# Patient Record
Sex: MALE | Race: WHITE | NOT HISPANIC OR LATINO | Employment: OTHER | URBAN - METROPOLITAN AREA
[De-identification: names, ages, dates, MRNs, and addresses within clinical notes are randomized per-mention and may not be internally consistent; named-entity substitution may affect disease eponyms.]

---

## 2021-01-25 ENCOUNTER — LAB (OUTPATIENT)
Dept: LAB | Facility: HOSPITAL | Age: 51
End: 2021-01-25

## 2021-01-25 ENCOUNTER — OFFICE VISIT (OUTPATIENT)
Dept: INTERNAL MEDICINE | Facility: CLINIC | Age: 51
End: 2021-01-25

## 2021-01-25 ENCOUNTER — TELEPHONE (OUTPATIENT)
Dept: INTERNAL MEDICINE | Facility: CLINIC | Age: 51
End: 2021-01-25

## 2021-01-25 VITALS
HEART RATE: 89 BPM | HEIGHT: 67 IN | SYSTOLIC BLOOD PRESSURE: 182 MMHG | TEMPERATURE: 97.1 F | DIASTOLIC BLOOD PRESSURE: 75 MMHG | BODY MASS INDEX: 32.02 KG/M2 | OXYGEN SATURATION: 95 % | WEIGHT: 204 LBS

## 2021-01-25 DIAGNOSIS — I10 ESSENTIAL HYPERTENSION: Primary | ICD-10-CM

## 2021-01-25 DIAGNOSIS — Z11.59 NEED FOR HEPATITIS C SCREENING TEST: ICD-10-CM

## 2021-01-25 DIAGNOSIS — Z13.220 NEED FOR LIPID SCREENING: ICD-10-CM

## 2021-01-25 DIAGNOSIS — K21.9 CHRONIC GERD: ICD-10-CM

## 2021-01-25 DIAGNOSIS — Z29.9 PREVENTIVE MEASURE: ICD-10-CM

## 2021-01-25 DIAGNOSIS — R21 RASH: ICD-10-CM

## 2021-01-25 DIAGNOSIS — R73.9 ELEVATED BLOOD SUGAR: ICD-10-CM

## 2021-01-25 PROCEDURE — 83013 H PYLORI (C-13) BREATH: CPT | Performed by: FAMILY MEDICINE

## 2021-01-25 PROCEDURE — 99204 OFFICE O/P NEW MOD 45 MIN: CPT | Performed by: FAMILY MEDICINE

## 2021-01-25 RX ORDER — CARVEDILOL 12.5 MG/1
12.5 TABLET ORAL 2 TIMES DAILY WITH MEALS
COMMUNITY
End: 2021-01-25 | Stop reason: DRUGHIGH

## 2021-01-25 RX ORDER — ASPIRIN 81 MG/1
81 TABLET ORAL DAILY
Qty: 90 TABLET | Refills: 1 | Status: SHIPPED | OUTPATIENT
Start: 2021-01-25 | End: 2021-08-03 | Stop reason: SDUPTHER

## 2021-01-25 RX ORDER — FAMOTIDINE 20 MG/1
20 TABLET, FILM COATED ORAL 2 TIMES DAILY
COMMUNITY
End: 2021-01-28 | Stop reason: SDUPTHER

## 2021-01-25 RX ORDER — DUPILUMAB 300 MG/2ML
INJECTION, SOLUTION SUBCUTANEOUS
COMMUNITY
Start: 2021-01-04

## 2021-01-25 RX ORDER — CARVEDILOL 25 MG/1
25 TABLET ORAL 2 TIMES DAILY WITH MEALS
Qty: 60 TABLET | Refills: 3 | Status: SHIPPED | OUTPATIENT
Start: 2021-01-25 | End: 2021-04-15 | Stop reason: SINTOL

## 2021-01-25 RX ORDER — FLUTICASONE PROPIONATE 50 MCG
2 SPRAY, SUSPENSION (ML) NASAL DAILY
COMMUNITY
End: 2021-06-07 | Stop reason: SDUPTHER

## 2021-01-25 RX ORDER — MONTELUKAST SODIUM 10 MG/1
10 TABLET ORAL NIGHTLY
COMMUNITY
End: 2021-06-07 | Stop reason: SDUPTHER

## 2021-01-25 NOTE — PATIENT INSTRUCTIONS
Hypertension, Adult  Hypertension is another name for high blood pressure. High blood pressure forces your heart to work harder to pump blood. This can cause problems over time.  There are two numbers in a blood pressure reading. There is a top number (systolic) over a bottom number (diastolic). It is best to have a blood pressure that is below 120/80. Healthy choices can help lower your blood pressure, or you may need medicine to help lower it.  What are the causes?  The cause of this condition is not known. Some conditions may be related to high blood pressure.  What increases the risk?  · Smoking.  · Having type 2 diabetes mellitus, high cholesterol, or both.  · Not getting enough exercise or physical activity.  · Being overweight.  · Having too much fat, sugar, calories, or salt (sodium) in your diet.  · Drinking too much alcohol.  · Having long-term (chronic) kidney disease.  · Having a family history of high blood pressure.  · Age. Risk increases with age.  · Race. You may be at higher risk if you are .  · Gender. Men are at higher risk than women before age 45. After age 65, women are at higher risk than men.  · Having obstructive sleep apnea.  · Stress.  What are the signs or symptoms?  · High blood pressure may not cause symptoms. Very high blood pressure (hypertensive crisis) may cause:  ? Headache.  ? Feelings of worry or nervousness (anxiety).  ? Shortness of breath.  ? Nosebleed.  ? A feeling of being sick to your stomach (nausea).  ? Throwing up (vomiting).  ? Changes in how you see.  ? Very bad chest pain.  ? Seizures.  How is this treated?  · This condition is treated by making healthy lifestyle changes, such as:  ? Eating healthy foods.  ? Exercising more.  ? Drinking less alcohol.  · Your health care provider may prescribe medicine if lifestyle changes are not enough to get your blood pressure under control, and if:  ? Your top number is above 130.  ? Your bottom number is above  80.  · Your personal target blood pressure may vary.  Follow these instructions at home:  Eating and drinking    · If told, follow the DASH eating plan. To follow this plan:  ? Fill one half of your plate at each meal with fruits and vegetables.  ? Fill one fourth of your plate at each meal with whole grains. Whole grains include whole-wheat pasta, brown rice, and whole-grain bread.  ? Eat or drink low-fat dairy products, such as skim milk or low-fat yogurt.  ? Fill one fourth of your plate at each meal with low-fat (lean) proteins. Low-fat proteins include fish, chicken without skin, eggs, beans, and tofu.  ? Avoid fatty meat, cured and processed meat, or chicken with skin.  ? Avoid pre-made or processed food.  · Eat less than 1,500 mg of salt each day.  · Do not drink alcohol if:  ? Your doctor tells you not to drink.  ? You are pregnant, may be pregnant, or are planning to become pregnant.  · If you drink alcohol:  ? Limit how much you use to:  § 0-1 drink a day for women.  § 0-2 drinks a day for men.  ? Be aware of how much alcohol is in your drink. In the [place] one drink equals one 12 oz bottle of beer (355 mL), one 5 oz glass of wine (148 mL), or one 1½ oz glass of hard liquor (44 mL).  Lifestyle    · Work with your doctor to stay at a healthy weight or to lose weight. Ask your doctor what the best weight is for you.  · Get at least 30 minutes of exercise most days of the week. This may include walking, swimming, or biking.  · Get at least 30 minutes of exercise that strengthens your muscles (resistance exercise) at least 3 days a week. This may include lifting weights or doing Pilates.  · Do not use any products that contain nicotine or tobacco, such as cigarettes, e-cigarettes, and chewing tobacco. If you need help quitting, ask your doctor.  · Check your blood pressure at home as told by your doctor.  · Keep all follow-up visits as told by your doctor. This is important.  Medicines  · Take over-the-counter  and prescription medicines only as told by your doctor. Follow directions carefully.  · Do not skip doses of blood pressure medicine. The medicine does not work as well if you skip doses. Skipping doses also puts you at risk for problems.  · Ask your doctor about side effects or reactions to medicines that you should watch for.  Contact a doctor if you:  · Think you are having a reaction to the medicine you are taking.  · Have headaches that keep coming back (recurring).  · Feel dizzy.  · Have swelling in your ankles.  · Have trouble with your vision.  Get help right away if you:  · Get a very bad headache.  · Start to feel mixed up (confused).  · Feel weak or numb.  · Feel faint.  · Have very bad pain in your:  ? Chest.  ? Belly (abdomen).  · Throw up more than once.  · Have trouble breathing.  Summary  · Hypertension is another name for high blood pressure.  · High blood pressure forces your heart to work harder to pump blood.  · For most people, a normal blood pressure is less than 120/80.  · Making healthy choices can help lower blood pressure. If your blood pressure does not get lower with healthy choices, you may need to take medicine.  This information is not intended to replace advice given to you by your health care provider. Make sure you discuss any questions you have with your health care provider.  Document Revised: 08/28/2019 Document Reviewed: 08/28/2019  Elsevier Patient Education © 2020 [employer]

## 2021-01-25 NOTE — PROGRESS NOTES
Subjective     Demetria Johnson is a 50 y.o. male.     Chief Complaint   Patient presents with   • Establish Care   • Hypertension   • Heartburn       History of Present Illness     HTN  -This is a chronic problem.   -BP check at home 140-170/  -salt intake :on low sodium diet   -associated signs and symptoms: none  -BP is not well controlled.   -No S/E reported from current Rx   -medication compliance: taking as prescribed  -Denies  blurred vision, chest pain, neck pain, orthopnea or shortness of breath.      Seasonal allergy with chronic sinusitis , he had 2 sinus surgery for sinus polyps , currently he is on biweekly injection for contact dermatitis     H/o elevated BG , not on Rx       Gastroesophageal Reflux   Patient complains of belching, heartburn and nausea. This is a recurrent problem. The current episode started more than 1 year ago. The problem occurs frequently. The heartburn duration is several minutes. The heartburn is located in the substernum.   The heartburn does not wake pt from sleep. The heartburn limits pt activity.   The heartburn changes with position.   The symptoms are aggravated by certain foods, lying down and caffeine.   Pertinent negatives include no melena.   There are no known risk factors.   2 months ago he had EGD at  , no one called him about the result  On Famotidine with little help    Rash; on the Lt forearm for 4 weeks , itchy , no change in size        The following portions of the patient's history were reviewed and updated as appropriate: allergies, current medications, past family history, past medical history, past social history, past surgical history and problem list.        Review of Systems   Gastrointestinal: Positive for GERD and indigestion. Negative for abdominal distention, abdominal pain, blood in stool, constipation, nausea and vomiting.   Musculoskeletal: Positive for arthralgias and myalgias. Negative for back pain and gait problem.   Skin:  Positive for color change and rash.       Vitals:    01/25/21 1056   BP: (!) 182/75   Pulse: 89   Temp: 97.1 °F (36.2 °C)   SpO2: 95%           01/25/21  1056   Weight: 92.5 kg (204 lb)         Body mass index is 31.95 kg/m².      Current Outpatient Medications   Medication Sig Dispense Refill   • Dupixent 300 MG/2ML solution prefilled syringe      • famotidine (PEPCID) 20 MG tablet Take 20 mg by mouth 2 (Two) Times a Day.     • fluticasone (FLONASE) 50 MCG/ACT nasal spray 2 sprays into the nostril(s) as directed by provider Daily.     • Fluticasone Furoate-Vilanterol (BREO ELLIPTA IN) Inhale.     • montelukast (SINGULAIR) 10 MG tablet Take 10 mg by mouth Every Night.     • aspirin (aspirin) 81 MG EC tablet Take 1 tablet by mouth Daily. 90 tablet 1   • carvedilol (Coreg) 25 MG tablet Take 1 tablet by mouth 2 (Two) Times a Day With Meals. 60 tablet 3   • triamcinolone (KENALOG) 0.1 % ointment Apply  topically to the appropriate area as directed 2 (Two) Times a Day. 30 g 1     No current facility-administered medications for this visit.                 Objective   Physical Exam  Constitutional:       General: He is not in acute distress.     Appearance: Normal appearance. He is not ill-appearing, toxic-appearing or diaphoretic.   HENT:      Head: Normocephalic.      Mouth/Throat:      Mouth: Mucous membranes are moist.   Eyes:      General: No scleral icterus.     Conjunctiva/sclera: Conjunctivae normal.   Neck:      Musculoskeletal: Neck supple.      Comments: No enlarged thyroid    Cardiovascular:      Rate and Rhythm: Normal rate and regular rhythm.      Heart sounds: Normal heart sounds. No murmur. No friction rub. No gallop.    Pulmonary:      Effort: Pulmonary effort is normal. No respiratory distress.      Breath sounds: Normal breath sounds. No stridor. No wheezing or rhonchi.   Abdominal:      General: Bowel sounds are normal. There is no distension.      Palpations: Abdomen is soft. There is no mass.       Tenderness: There is no abdominal tenderness. There is no right CVA tenderness, left CVA tenderness, guarding or rebound.      Hernia: No hernia is present.   Musculoskeletal:      Right lower leg: No edema.      Left lower leg: No edema.   Skin:     General: Skin is warm.      Findings: Erythema and rash present.      Comments: Erythematous rash over the Lt forearm    Neurological:      Mental Status: He is alert and oriented to person, place, and time.   Psychiatric:         Mood and Affect: Mood normal.         Behavior: Behavior normal.         Thought Content: Thought content normal.         Judgment: Judgment normal.           Assessment/Plan   Diagnoses and all orders for this visit:    1. Essential hypertension (Primary);  - Not at goal, will double the dose of coreg  -     carvedilol (Coreg) 25 MG tablet; Take 1 tablet by mouth 2 (Two) Times a Day With Meals.  Dispense: 60 tablet; Refill: 3  - Close monitoring and f/u   - Encouraged patient to maintain a heart healthy diet/DASH diet, exercise as tolerated, limit sodium intake to no more than 1,500mg/day, limit alcohol intake, maintain medication compliance and practice relaxation techniques to reduce stress. To ER for chest pain or signs/symptoms of stroke.     2. Chronic GERD;  - Recommendations discussed with patient for decreasing CHARLEEN symptoms: eliminate foods that may trigger symptoms, avoid alcohol at bedtime, and avoid lying down after 3 hours of eating.  Common irritating foods include: chocolate, garlic, onions, citrus fruits, coffee, alcohol, highly seasoned foods and carbonated beverages.   -     H. Pylori Breath Test - Breath, Lung; Future  -     H. Pylori Breath Test - Breath, Lung    3. Rash  -     triamcinolone (KENALOG) 0.1 % ointment; Apply  topically to the appropriate area as directed 2 (Two) Times a Day.  Dispense: 30 g; Refill: 1    4. Preventive measure  -     aspirin (aspirin) 81 MG EC tablet; Take 1 tablet by mouth Daily.  Dispense:  90 tablet; Refill: 1      I have fully discussed the nature of the medical condition(s) risks, complications, management, safe and proper use of medications.   I have discussed the SIDE EFFECT OF MEDICATION and importance TO report any side effect , the patient expressed good understanding.  Encouraged medication compliance and the importance of keeping scheduled follow up appointments with me and any other providers.    Patient instructed to follow up with our office for results on any labs/imaging ordered during this visit.    Home care discussed  Labs ordered to be done before next visit   Screening for Depression done , please see [facility] PHQ 2/9  All questions answered  Patient verbalizes understanding and agrees to treatment plan.     Follow up: Return in about 3 weeks (around 2/15/2021) for f/u on HTN, needs blood work done 2 days before the appoin.

## 2021-01-25 NOTE — TELEPHONE ENCOUNTER
----- Message from Tj Caba MD sent at 1/25/2021 12:14 PM EST -----  Pt had EGD done at  2 months ago  Please , do you mind to check if we can get copy of that    Thx    Tj Caba MD

## 2021-01-26 LAB — UREA BREATH TEST QL: POSITIVE

## 2021-01-27 ENCOUNTER — TELEPHONE (OUTPATIENT)
Dept: INTERNAL MEDICINE | Facility: CLINIC | Age: 51
End: 2021-01-27

## 2021-01-27 NOTE — TELEPHONE ENCOUNTER
----- Message from Tj Caba MD sent at 1/26/2021  3:45 PM EST -----  Please call for appointment to discuss labs results

## 2021-01-28 ENCOUNTER — OFFICE VISIT (OUTPATIENT)
Dept: INTERNAL MEDICINE | Facility: CLINIC | Age: 51
End: 2021-01-28

## 2021-01-28 VITALS
OXYGEN SATURATION: 99 % | TEMPERATURE: 97.5 F | HEART RATE: 84 BPM | WEIGHT: 211 LBS | DIASTOLIC BLOOD PRESSURE: 78 MMHG | BODY MASS INDEX: 33.12 KG/M2 | HEIGHT: 67 IN | SYSTOLIC BLOOD PRESSURE: 142 MMHG

## 2021-01-28 DIAGNOSIS — I10 ESSENTIAL HYPERTENSION: ICD-10-CM

## 2021-01-28 DIAGNOSIS — K21.9 CHRONIC GERD: ICD-10-CM

## 2021-01-28 DIAGNOSIS — A04.8 H. PYLORI INFECTION: Primary | ICD-10-CM

## 2021-01-28 PROCEDURE — 99214 OFFICE O/P EST MOD 30 MIN: CPT | Performed by: FAMILY MEDICINE

## 2021-01-28 RX ORDER — OMEPRAZOLE 20 MG/1
20 CAPSULE, DELAYED RELEASE ORAL 2 TIMES DAILY
Qty: 28 CAPSULE | Refills: 0 | Status: SHIPPED | OUTPATIENT
Start: 2021-01-28 | End: 2021-02-11

## 2021-01-28 RX ORDER — AMOXICILLIN 500 MG/1
1000 TABLET, FILM COATED ORAL 2 TIMES DAILY
Qty: 56 TABLET | Refills: 0 | Status: SHIPPED | OUTPATIENT
Start: 2021-01-28 | End: 2021-02-11

## 2021-01-28 RX ORDER — CLARITHROMYCIN 500 MG/1
500 TABLET, COATED ORAL 2 TIMES DAILY
Qty: 28 TABLET | Refills: 0 | Status: SHIPPED | OUTPATIENT
Start: 2021-01-28 | End: 2021-02-11

## 2021-01-28 NOTE — PROGRESS NOTES
Subjective     Eric Johnson is a 50 y.o. male.     Chief Complaint   Patient presents with   • Follow-up     to follow up on the breathing test        History of Present Illness     Pt with chronic GERD , sx getting worse lately with stomach pain 5-7/10 , with N, no vomiting   Has normal BM  Taking PPI with no better   Breathing test done few days ago , was +ve     HTN; his home BP improved after increasing dose of Coreg to 25 mg BID   He is on low salt diet   Denies CP,HA      The following portions of the patient's history were reviewed and updated as appropriate: allergies, current medications, past family history, past medical history, past social history, past surgical history and problem list.        Review of Systems   Cardiovascular: Negative for chest pain, palpitations and leg swelling.   Gastrointestinal: Positive for abdominal pain, nausea, GERD and indigestion. Negative for blood in stool, constipation, diarrhea and vomiting.   Neurological: Negative for headache.       Vitals:    01/28/21 1126   BP: 142/78   Pulse: 84   Temp: 97.5 °F (36.4 °C)   SpO2: 99%           01/28/21  1126   Weight: 95.7 kg (211 lb)         Body mass index is 33.04 kg/m².      Current Outpatient Medications   Medication Sig Dispense Refill   • aspirin (aspirin) 81 MG EC tablet Take 1 tablet by mouth Daily. 90 tablet 1   • Breo Ellipta 200-25 MCG/INH inhaler      • carvedilol (Coreg) 25 MG tablet Take 1 tablet by mouth 2 (Two) Times a Day With Meals. 60 tablet 3   • Dupixent 300 MG/2ML solution prefilled syringe      • fluticasone (FLONASE) 50 MCG/ACT nasal spray 2 sprays into the nostril(s) as directed by provider Daily.     • montelukast (SINGULAIR) 10 MG tablet Take 10 mg by mouth Every Night.     • triamcinolone (KENALOG) 0.1 % ointment Apply  topically to the appropriate area as directed 2 (Two) Times a Day. 30 g 1   • amoxicillin (AMOXIL) 500 MG tablet Take 2 tablets by mouth 2 (Two) Times a Day for 14 days. 56 tablet  0   • bismuth subsalicylate (Pepto-Bismol) 262 MG/15ML suspension Take 15 mL by mouth Every 6 (Six) Hours for 14 days. 840 mL 0   • clarithromycin (BIAXIN) 500 MG tablet Take 1 tablet by mouth 2 (Two) Times a Day for 14 days. 28 tablet 0   • omeprazole (PrilOSEC) 20 MG capsule Take 1 capsule by mouth 2 (two) times a day for 14 days. 28 capsule 0     No current facility-administered medications for this visit.                 Objective   Physical Exam  Vitals signs and nursing note reviewed.   Constitutional:       General: He is not in acute distress.     Appearance: He is not ill-appearing, toxic-appearing or diaphoretic.   HENT:      Mouth/Throat:      Mouth: Mucous membranes are moist.   Eyes:      General: No scleral icterus.  Cardiovascular:      Rate and Rhythm: Normal rate and regular rhythm.      Heart sounds: Normal heart sounds. No murmur.   Pulmonary:      Effort: Pulmonary effort is normal. No respiratory distress.      Breath sounds: Normal breath sounds. No stridor. No wheezing or rhonchi.   Abdominal:      General: Bowel sounds are normal. There is no distension.      Palpations: Abdomen is soft. There is no mass.      Tenderness: There is abdominal tenderness. There is no right CVA tenderness, left CVA tenderness, guarding or rebound.      Hernia: No hernia is present.      Comments: Mild epigastric TTP   Musculoskeletal:      Right lower leg: No edema.      Left lower leg: No edema.   Skin:     General: Skin is warm.   Neurological:      Mental Status: He is alert and oriented to person, place, and time.   Psychiatric:         Mood and Affect: Mood normal.         Behavior: Behavior normal.           Assessment/Plan   Diagnoses and all orders for this visit:    1. H. pylori infection (Primary)  -     clarithromycin (BIAXIN) 500 MG tablet; Take 1 tablet by mouth 2 (Two) Times a Day for 14 days.  Dispense: 28 tablet; Refill: 0  -     amoxicillin (AMOXIL) 500 MG tablet; Take 2 tablets by mouth 2 (Two)  Times a Day for 14 days.  Dispense: 56 tablet; Refill: 0  -     omeprazole (PrilOSEC) 20 MG capsule; Take 1 capsule by mouth 2 (two) times a day for 14 days.  Dispense: 28 capsule; Refill: 0  -     bismuth subsalicylate (Pepto-Bismol) 262 MG/15ML suspension; Take 15 mL by mouth Every 6 (Six) Hours for 14 days.  Dispense: 840 mL; Refill: 0    2. Chronic GERD;  - Hope will improve after H.pylori Rx    3. Essential hypertension;  - Improving , continue Rx with close monitoring     I have fully discussed the nature of the medical condition(s) risks, complications, management, safe and proper use of medications.   I have discussed the SIDE EFFECT OF MEDICATION and importance TO report any side effect , the patient expressed good understanding.  Encouraged medication compliance and the importance of keeping scheduled follow up appointments with me and any other providers.    Patient instructed to follow up with our office for results on any labs/imaging ordered during this visit.    Home care discussed  All questions answered  Patient verbalizes understanding and agrees to treatment plan.     Follow up: Return in about 6 weeks (around 3/11/2021) for follow up on current illness.

## 2021-01-28 NOTE — TELEPHONE ENCOUNTER
I have made multiple fax attempts to get results and still haven't received. I have had  assist in this as well. Just waiting for [facility] to fax over.

## 2021-01-28 NOTE — PATIENT INSTRUCTIONS
ÊÍáíá ÇáÃÌÓÇã ÇáãÖÇÏÉ ááÈßÊíÑíÇ ÇáãáæíÉ ÇáÈæÇÈíÉ  Helicobacter Pylori Antibodies Test  áãÇÐÇ ÓÃÎÖÚ áåÐÇ ÇáÊÍáíá¿  íõÌÑì åÐÇ ÇáÊÍáíá ááßÔÝ Úä äæÚ ãä ÇáÈßÊÑíÇ íõÚÑÝ ÈÇÓã ÇáãáæíÉ ÇáÈæÇÈíÉ (H. pylori). æÇáãáæíÉ ÇáÈæÇÈíÉ íãßä Ãä ÊæÌÏ Ýí ÇáÎáÇíÇ ÇáãÈØäÉ ááãÚÏÉ¡ ææÌæÏ ßãíÉ ßÈíÑÉ ãäåÇ íÚÑÖß ááãÎÇØÑ ÇáÊÇáíÉ:  • ÞÑÍÇÊ ÇáãÚÏÉ æÞÑÍÇÊ ÇáÃãÚÇÁ ÇáÏÞíÞÉ.  • ÇáÇáÊåÇÈ Øæíá ÇáÃãÏ (ÇáãÒãä) áÈØÇäÉ ÇáãÚÏÉ.  • ÞÑæÍ Ýí ÇáÃäÈæÈ ÇáÐí íäÞá ÇáØÚÇã ãä ÇáÝã Åáì ÇáãÚÏÉ (ÇáãÑíÁ).  • ÓÑØÇä ÇáãÚÏÉ Ýí ÍÇáÉ ÚÏã ÚáÇÌ ÇáÚÏæì.  áÇ íÚÇäí ÛÇáÈíÉ ÇáãÕÇÈíä ÈÇáÈßÊÑíÇ ÇáãáæíÉ ÇáÈæÇÈíÉ Ýí ÇáãÚÏÉ ãä Ãí ÃÚÑÇÖ. æÞÏ íØáÈ ãäß ãÞÏã ÇáÑÚÇíÉ ÇáÕÍíÉ ÅÌÑÇÁ åÐÇ ÇáÊÍáíá ÅÐÇ ÚÇäíÊ ãä ÃÚÑÇÖ ÞÑÍÉ ÇáãÚÏÉ Ãæ ÞÑÍÉ ÇáÃãÚÇÁ ÇáÏÞíÞÉ¡ ãËá ÇáÔÚæÑ ÈÃáã Ýí ÇáãÚÏÉ ÞÈá ÇáÃßá Ãæ ÈÚÏå¡ Ãæ ÍóÑúÞóÉñ Ýã ÇáãóÚöÏóÉ¡ Ãæ ÇáÛËíÇä ÈÚÏ ÇáÃßá.  ãÇ ÇáÛÑÖ ãä åÐÇ ÇáÊÍáíá¿  íõÌÑì åÐÇ ÇáÊÍáíá ááßÔÝ Úä ÇáÃÌÓÇã ÇáãÖÇÏÉ ááÈßÊíÑíÇ ÇáãáæíÉ ÇáÈæÇÈíÉ Ýí ÇáÏã. æÇáÃÌÓÇã ÇáãÖÇÏÉ åí ÈÑæÊíäÇÊ íäÊÌåÇ ÌåÇÒ ÇáãäÇÚÉ áãÍÇÑÈÉ ÇáÌÑÇËíã æÇáÚÏæì. æíßÔÝ ÇáÊÍáíá Úä æÌæÏ ÇáÃÌÓÇã ÇáãÖÇÏÉ ÇáÊí íäÊÌåÇ ÇáÌåÇÒ ÇáãäÇÚí ÇÓÊÌÇÈÉ ááÚÏæì ÈÈßÊíÑíÇ ÇáãáæíÉ ÇáÈæÇÈíÉ.  ãÇ äæÚ ÇáÚíäÉ ÇáÊí íÊã ÃÎÐåÇ¿    íÊØáÈ åÐÇ ÇáÊÍáíá ÚíäÉ Ïã¡ æÚÇÏÉ ãÇ ÊõÓÍÈ åÐå ÇáÚíäÉ Úä ØÑíÞ ÅÏÎÇá ÅÈÑÉ Ýí ÃÍÏ ÇáÃæÚíÉ ÇáÏãæíÉ Ãæ æÎÒ ÃÍÏ ÇáÃÕÇÈÚ ÈÅÈÑÉ ÕÛíÑÉ.  ÃÎÈÑ ãÞÏã ÇáÑÚÇíÉ ÇáÕÍíÉ Úä:  • ÌãíÚ ÇáÃÏæíÉ ÇáÊí ÊÊäÇæáåÇ¡ ÈãÇ Ýí Ðáß ÇáÝíÊÇãíäÇÊ æÇáÃÚÔÇÈ æÞØÑÇÊ ÇáÚíä æÇáßÑíãÇÊ æÇáÃÏæíÉ ÇáÊí ÊõÕÑÝ Ïæä æÕÝÉ ØÈíÉ.  ßíÝ íÊã ÊÞÏíã ÇáäÊÇÆÌ¿  Êßæä äÊÇÆÌ ÇáÊÍáíá ÈÞíã ÊÕäÝ Úáì ÃäåÇ ÅãÇ ÅíÌÇÈíÉ¡ Ãæ ÓáÈíÉ¡ Ãæ ãáÊÈÓÉ. æÇáãáÊÈÓÉ ÊÚäí Ãä ÇáäÊÇÆÌ áíÓÊ ÅíÌÇÈíÉ æáíÓÊ ÓáÈíÉ. æÓíÞÇÑä ãÞÏã ÇáÑÚÇíÉ ÇáÕÍíÉ äÊÇÆÌß ÈÇáãÚÏáÇÊ ÇáØÈíÚíÉ ÇáÊí Êã æÖÚåÇ ÈÚÏ ÇÎÊÈÇÑ ãÌãæÚÉ ßÈíÑÉ ãä ÇáÃÔÎÇÕ (ÇáãÚÏáÇÊ ÇáãÑÌÚíÉ)¡ ãÚ ãÑÇÚÇÉ Ãä äØÇÞ ÇáäÊÇÆÌ ÇáãÑÌÚíÉ ÞÏ íÊÝÇæÊ ÈÇÎÊáÇÝ ÇáãÎÊÈÑÇÊ æÇáãÓÊÔÝíÇÊ. æÇáãÚÏáÇÊ ÇáãÑÌÚíÉ ÇáÔÇÆÚÉ áåÐÇ ÇáÊÍáíá áäæÚí ÇáÃÌÓÇã ÇáãÖÇÏÉ ÇáÊí íãßä ÇÎÊÈÇÑåÇ åí:  • ÇáÃÌÓÇã ÇáãÖÇÏÉ IgG:  ? ÃÞá ãä 0.75 æÍÏÉ/ãááí áÊÑ. ÊÚäí Ãä ÇáäÊíÌÉ ÓáÈíÉ.  ? ÃßÈÑ ãä Ãæ íÓÇæí 1 æÍÏÉ/ãááí áÊÑ. ÊÚäí Ãä ÇáäÊíÌÉ  ÅíÌÇÈíÉ.  ? 0.75-0.99 æÍÏÉ/ãááí áÊÑ. ÊÚäí Ãä ÇáäÊíÌÉ ãáÊÈÓÉ.  • ÇáÃÌÓÇã ÇáãÖÇÏÉ IgM:  ? ÃÞá ãä Ãæ íÓÇæí 30 æÍÏÉ/ãááí áÊÑ. ÊÚäí Ãä ÇáäÊíÌÉ ÓáÈíÉ.  ? ÃßÈÑ ãä Ãæ íÓÇæí 40 æÍÏÉ/ãááí áÊÑ. ÊÚäí Ãä ÇáäÊíÌÉ ÅíÌÇÈíÉ.  ? 30.01-39.99 æÍÏÉ/ãááí áÊÑ. ÊÚäí Ãä ÇáäÊíÌÉ ãáÊÈÓÉ.  ãÇÐÇ ÊÚäí ÇáäÊÇÆÌ¿  ÚäÏãÇ ÊÃÊí äÊíÌÉ ÇáÊÍáíá ÃÚáì ãä ÇáãÓÊæì ÇáØÈíÚí Ãæ ÅíÌÇÈíÉ¡ ÝÞÏ íßæä Ðáß ÏáíáÇð Úáì æÌæÏ ÇáÚÏíÏ ãä ÇáÍÇáÇÊ ÇáãÑÖíÉ¡ ãËá:  • ÇáÊåÇÈ ÈØÇäÉ ÇáãÚÏÉ ÓæÇÁ ÞÕíÑ ÇáÃãÏ Ãæ Øæíá ÇáÃãÏ (ÇáÊåÇÈ ÇáãÚÏÉ).  • ÞÑÍÉ ÇáÃãÚÇÁ ÇáÏÞíÞÉ.  • ÞÑÍÉ ÇáãÚÏÉ.  • ÓÑØÇä ÇáãÚÏÉ.  ÇÓÊÔÑ ãÞÏã ÇáÑÚÇíÉ ÇáÕÍíÉ ÈÔÃä ãÚäì äÊÇÆÌ ÇáÊÍáíá ÇáÎÇÕÉ Èß.  ÇáÃÓÆáÉ ÇáÊí íãßäß ØÑÍåÇ Úáì ãõÞÏøöã ÇáÑÚÇíÉ ÇáÕÍíÉ ÇáãÊÇÈÚ áß  ÇÓÃá ãÞÏã ÇáÑÚÇíÉ ÇáÕÍíÉ Ãæ ÇáÞÓã ÇáãÓÄæá Úä ÅÌÑÇÁ ÇáÊÍáíá:  • ãÊì ãæÚÏ ÇÓÊáÇã ÇáäÊÇÆÌ¿  • ßíÝ ÃÍÕá Úáì ÇáäÊíÌÉ¿  • ãÇ ÎíÇÑÇÊ ÇáÚáÇÌ ÇáãÊæÝÑÉ áÍÇáÊí¿  • ãÇ ÇáÇÎÊÈÇÑÇÊ ÇáÃÎÑì ÇáÊí ÃÍÊÇÌåÇ¿  • ãÇ ÇáÎØæÇÊ ÇáÊÇáíÉ ÇáÊí ÃÍÊÇÌ áÇÊÎÇÐåÇ¿  ãáÎÕ  • íõÌÑì åÐÇ ÇáÊÍáíá ááßÔÝ Úä äæÚ ãä ÇáÈßÊÑíÇ íõÚÑÝ ÈÇÓã ÇáãáæíÉ ÇáÈæÇÈíÉ (H. pylori). æÌæÏ ãÓÊæíÇÊ ãÑÊÝÚÉ ãä ÇáãáæíÉ ÇáÈæÇÈíÉ Ýí ãÚÏÊß íÚÑÖß áÎØÑ ÇáÅÕÇÈÉ ÈÞÑæÍ Ýí ÇáÞäÇÉ ÇáåÖãíÉ Ãæ ÓÑØÇä ÇáãÚÏÉ.  • íõÌÑì åÐÇ ÇáÊÍáíá ááßÔÝ Úä ÇáÃÌÓÇã ÇáãÖÇÏÉ ááÈßÊíÑíÇ ÇáãáæíÉ ÇáÈæÇÈíÉ Ýí ÇáÏã.  • áÇ íÚÇäí ÛÇáÈíÉ ÇáãÕÇÈíä ÈÇáÈßÊÑíÇ ÇáãáæíÉ ÇáÈæÇÈíÉ Ýí ÇáãÚÏÉ ãä Ãí ÃÚÑÇÖ. æÞÏ íØáÈ ãäß ãÞÏã ÇáÑÚÇíÉ ÇáÕÍíÉ ÅÌÑÇÁ åÐÇ ÇáÊÍáíá ÅÐÇ ÚÇäíÊ ãä ÃÚÑÇÖ ÞÑÍÉ ÇáãÚÏÉ Ãæ ÞÑÍÉ ÇáÃãÚÇÁ ÇáÏÞíÞÉ¡ ãËá ÇáÔÚæÑ ÈÃáã Ýí ÇáãÚÏÉ ÞÈá ÇáÃßá Ãæ ÈÚÏå¡ Ãæ ÍóÑúÞóÉñ Ýã ÇáãóÚöÏóÉ¡ Ãæ ÇáÛËíÇä ÈÚÏ ÇáÃßá.  • ÇÓÊÔÑ ãÞÏã ÇáÑÚÇíÉ ÇáÕÍíÉ ÈÔÃä ãÚäì äÊÇÆÌ ÇáÊÍáíá ÇáÎÇÕÉ Èß.  áíÓ ÇáåÏÝ ãä åÐå ÇáãÚáæãÇÊ Ãä Êßæä ÈÏíáÇð ááÅÑÔÇÏÇÊ ÇáÊí íÞÏãåÇ ãæÝÑ ÇáÑÚÇíÉ ÇáÕÍíÉ. ÊÃßÏ ãä ãäÇÞÔÉ ÃíÉ ÃÓÆáÉ ÊÏæÑ Ýí Ðåäß ãÚ ãæÝÑ ÇáÑÚÇíÉ ÇáÕÍíÉ.þ  Document Revised: 10/16/2018 Document Reviewed: 10/16/2018  [employer] Education © 2020 [employer]

## 2021-03-08 ENCOUNTER — LAB (OUTPATIENT)
Dept: LAB | Facility: HOSPITAL | Age: 51
End: 2021-03-08

## 2021-03-08 DIAGNOSIS — K21.9 CHRONIC GERD: ICD-10-CM

## 2021-03-08 DIAGNOSIS — Z11.59 NEED FOR HEPATITIS C SCREENING TEST: ICD-10-CM

## 2021-03-08 DIAGNOSIS — I10 ESSENTIAL HYPERTENSION: ICD-10-CM

## 2021-03-08 DIAGNOSIS — R73.9 ELEVATED BLOOD SUGAR: ICD-10-CM

## 2021-03-08 DIAGNOSIS — Z13.220 NEED FOR LIPID SCREENING: ICD-10-CM

## 2021-03-08 LAB
ALBUMIN SERPL-MCNC: 4.3 G/DL (ref 3.5–5.2)
ALBUMIN/GLOB SERPL: 1.2 G/DL
ALP SERPL-CCNC: 97 U/L (ref 39–117)
ALT SERPL W P-5'-P-CCNC: 28 U/L (ref 1–41)
ANION GAP SERPL CALCULATED.3IONS-SCNC: 9.2 MMOL/L (ref 5–15)
AST SERPL-CCNC: 21 U/L (ref 1–40)
BILIRUB SERPL-MCNC: 0.3 MG/DL (ref 0–1.2)
BUN SERPL-MCNC: 16 MG/DL (ref 6–20)
BUN/CREAT SERPL: 20 (ref 7–25)
CALCIUM SPEC-SCNC: 9.4 MG/DL (ref 8.6–10.5)
CHLORIDE SERPL-SCNC: 105 MMOL/L (ref 98–107)
CHOLEST SERPL-MCNC: 190 MG/DL (ref 0–200)
CO2 SERPL-SCNC: 24.8 MMOL/L (ref 22–29)
CREAT SERPL-MCNC: 0.8 MG/DL (ref 0.76–1.27)
DEPRECATED RDW RBC AUTO: 42.1 FL (ref 37–54)
ERYTHROCYTE [DISTWIDTH] IN BLOOD BY AUTOMATED COUNT: 13.3 % (ref 12.3–15.4)
GFR SERPL CREATININE-BSD FRML MDRD: 103 ML/MIN/1.73
GLOBULIN UR ELPH-MCNC: 3.6 GM/DL
GLUCOSE SERPL-MCNC: 118 MG/DL (ref 65–99)
HBA1C MFR BLD: 6.1 % (ref 4.8–5.6)
HCT VFR BLD AUTO: 44 % (ref 37.5–51)
HCV AB SER DONR QL: NORMAL
HDLC SERPL-MCNC: 47 MG/DL (ref 40–60)
HGB BLD-MCNC: 14.6 G/DL (ref 13–17.7)
LDLC SERPL CALC-MCNC: 120 MG/DL (ref 0–100)
LDLC/HDLC SERPL: 2.5 {RATIO}
MCH RBC QN AUTO: 29 PG (ref 26.6–33)
MCHC RBC AUTO-ENTMCNC: 33.2 G/DL (ref 31.5–35.7)
MCV RBC AUTO: 87.5 FL (ref 79–97)
PLATELET # BLD AUTO: 254 10*3/MM3 (ref 140–450)
PMV BLD AUTO: 10.8 FL (ref 6–12)
POTASSIUM SERPL-SCNC: 4.2 MMOL/L (ref 3.5–5.2)
PROT SERPL-MCNC: 7.9 G/DL (ref 6–8.5)
RBC # BLD AUTO: 5.03 10*6/MM3 (ref 4.14–5.8)
SODIUM SERPL-SCNC: 139 MMOL/L (ref 136–145)
TRIGL SERPL-MCNC: 128 MG/DL (ref 0–150)
VLDLC SERPL-MCNC: 23 MG/DL (ref 5–40)
WBC # BLD AUTO: 6.61 10*3/MM3 (ref 3.4–10.8)

## 2021-03-08 PROCEDURE — 86803 HEPATITIS C AB TEST: CPT | Performed by: FAMILY MEDICINE

## 2021-03-08 PROCEDURE — 80053 COMPREHEN METABOLIC PANEL: CPT | Performed by: FAMILY MEDICINE

## 2021-03-08 PROCEDURE — 85027 COMPLETE CBC AUTOMATED: CPT | Performed by: FAMILY MEDICINE

## 2021-03-08 PROCEDURE — 80061 LIPID PANEL: CPT | Performed by: FAMILY MEDICINE

## 2021-03-08 PROCEDURE — 83036 HEMOGLOBIN GLYCOSYLATED A1C: CPT | Performed by: FAMILY MEDICINE

## 2021-03-11 ENCOUNTER — OFFICE VISIT (OUTPATIENT)
Dept: INTERNAL MEDICINE | Facility: CLINIC | Age: 51
End: 2021-03-11

## 2021-03-11 VITALS
WEIGHT: 210.6 LBS | BODY MASS INDEX: 33.06 KG/M2 | SYSTOLIC BLOOD PRESSURE: 141 MMHG | HEART RATE: 95 BPM | TEMPERATURE: 97.1 F | DIASTOLIC BLOOD PRESSURE: 83 MMHG | OXYGEN SATURATION: 98 % | HEIGHT: 67 IN

## 2021-03-11 DIAGNOSIS — I10 ESSENTIAL HYPERTENSION: Primary | ICD-10-CM

## 2021-03-11 DIAGNOSIS — A04.8 H. PYLORI INFECTION: ICD-10-CM

## 2021-03-11 DIAGNOSIS — R73.03 PREDIABETES: ICD-10-CM

## 2021-03-11 DIAGNOSIS — K21.9 CHRONIC GERD: ICD-10-CM

## 2021-03-11 DIAGNOSIS — E78.2 MIXED HYPERLIPIDEMIA: ICD-10-CM

## 2021-03-11 PROCEDURE — 99214 OFFICE O/P EST MOD 30 MIN: CPT | Performed by: FAMILY MEDICINE

## 2021-03-11 RX ORDER — ATORVASTATIN CALCIUM 10 MG/1
10 TABLET, FILM COATED ORAL DAILY
Qty: 90 TABLET | Refills: 1 | Status: SHIPPED | OUTPATIENT
Start: 2021-03-11 | End: 2021-08-03 | Stop reason: SDUPTHER

## 2021-03-11 RX ORDER — LISINOPRIL 10 MG/1
10 TABLET ORAL DAILY
Qty: 30 TABLET | Refills: 3 | Status: SHIPPED | OUTPATIENT
Start: 2021-03-11 | End: 2021-08-03 | Stop reason: SDUPTHER

## 2021-03-11 NOTE — PROGRESS NOTES
Subjective     Eric Johnson is a 50 y.o. male.     Chief Complaint   Patient presents with   • Follow-up     bp f/u , discuss labs       History of Present Illness       HTN  -This is a chronic problem.   -follow-up of hypertension  -home blood pressure readings: BP check at home range 120-140/70-90 with some readings in 150/90  -salt intake : low sodium diet   -associated signs and symptoms: none  -No S/E reported from current Rx   -medication compliance: taking as prescribed  -Denies  blurred vision, chest pain, neck pain, orthopnea or shortness of breath.       Pt dx with H.pylori , started on Medication on 1/28 , did well , no S/E reported   His GERD sx improved little after he took the H.pylori medications then it came back.  Had EGD in 2020 at  , he dose not know the result yet.    Labs done few days ago showed mild elevation in A1c and LDL  Lab Results   Component Value Date    HGBA1C 6.10 (H) 03/08/2021     Lab Results   Component Value Date    CHOL 190 03/08/2021    TRIG 128 03/08/2021    HDL 47 03/08/2021     (H) 03/08/2021     Pt has been eating RD , likes bread and rice   Dose not do daily exercise   He gained many Ibs for the last few months        The following portions of the patient's history were reviewed and updated as appropriate: allergies, current medications, past family history, past medical history, past social history, past surgical history and problem list.        Review of Systems   Respiratory: Negative for shortness of breath, wheezing and stridor.    Cardiovascular: Negative for chest pain, palpitations and leg swelling.   Gastrointestinal: Positive for GERD. Negative for abdominal distention, anal bleeding and blood in stool.   Neurological: Negative for dizziness, headache and confusion.       Vitals:    03/11/21 0952   BP: 141/83   Pulse: 95   Temp: 97.1 °F (36.2 °C)   SpO2: 98%           03/11/21 0952   Weight: 95.5 kg (210 lb 9.6 oz)         Body mass index is 32.98  kg/m².      Current Outpatient Medications   Medication Sig Dispense Refill   • aspirin (aspirin) 81 MG EC tablet Take 1 tablet by mouth Daily. 90 tablet 1   • Breo Ellipta 200-25 MCG/INH inhaler      • carvedilol (Coreg) 25 MG tablet Take 1 tablet by mouth 2 (Two) Times a Day With Meals. 60 tablet 3   • Dupixent 300 MG/2ML solution prefilled syringe      • fluticasone (FLONASE) 50 MCG/ACT nasal spray 2 sprays into the nostril(s) as directed by provider Daily.     • montelukast (SINGULAIR) 10 MG tablet Take 10 mg by mouth Every Night.     • triamcinolone (KENALOG) 0.1 % ointment Apply  topically to the appropriate area as directed 2 (Two) Times a Day. 30 g 1   • atorvastatin (Lipitor) 10 MG tablet Take 1 tablet by mouth Daily. 90 tablet 1   • lisinopril (PRINIVIL,ZESTRIL) 10 MG tablet Take 1 tablet by mouth Daily. 30 tablet 3     No current facility-administered medications for this visit.                Objective   Physical Exam  Vitals and nursing note reviewed.   Constitutional:       General: He is not in acute distress.     Appearance: He is obese. He is not ill-appearing, toxic-appearing or diaphoretic.   HENT:      Mouth/Throat:      Mouth: Mucous membranes are moist.   Eyes:      General: No scleral icterus.     Conjunctiva/sclera: Conjunctivae normal.   Cardiovascular:      Rate and Rhythm: Normal rate and regular rhythm.      Heart sounds: Normal heart sounds. No murmur. No friction rub. No gallop.    Pulmonary:      Effort: Pulmonary effort is normal. No respiratory distress.      Breath sounds: Normal breath sounds. No stridor. No wheezing or rhonchi.   Abdominal:      General: Bowel sounds are normal. There is no distension.      Palpations: Abdomen is soft. There is no mass.      Tenderness: There is no abdominal tenderness. There is no guarding or rebound.      Hernia: No hernia is present.   Musculoskeletal:      Cervical back: Neck supple.   Skin:     General: Skin is warm.      Findings: No rash.    Neurological:      Mental Status: He is alert and oriented to person, place, and time.   Psychiatric:         Mood and Affect: Mood normal.         Behavior: Behavior normal.         Thought Content: Thought content normal.           Assessment/Plan   Diagnoses and all orders for this visit:    1. Essential hypertension (Primary);  - Still BP not at goal , will add;  -     lisinopril (PRINIVIL,ZESTRIL) 10 MG tablet; Take 1 tablet by mouth Daily.  Dispense: 30 tablet; Refill: 3    2. Chronic GERD;  - Will get copy of his EGD from  , then will decide for the next step   - Recommendations discussed with patient for decreasing CHARLEEN symptoms: eliminate foods that may trigger symptoms, avoid alcohol at bedtime, and avoid lying down after 3 hours of eating.  Common irritating foods include: chocolate, garlic, onions, citrus fruits, coffee, alcohol, highly seasoned foods and carbonated beverages.     3. H. pylori infection  -     H. Pylori Breath Test - Breath, Lung; Future    4. Mixed hyperlipidemia  -     atorvastatin (Lipitor) 10 MG tablet; Take 1 tablet by mouth Daily.  Dispense: 90 tablet; Refill: 1  Encouraged patient to maintain a low cholesterol/DASH diet, increase aerobic exercise as tolerated, decrease alcohol, stop smoking if applicable, increase fiber intake, limit sodium intake to no more than 1,500mg/day, increase omega-3 fatty acids, and maintain medication compliance.     5. Prediabetes;  Advised healthy diet and lifestyle modifications as prescribed.  Encouraged patient to maintain a diabetic diet, increase exercise as tolerated and decrease stress.     I have fully discussed the nature of the medical condition(s) risks, complications, management, safe and proper use of medications.   I have discussed the SIDE EFFECT OF MEDICATION and importance TO report any side effect , the patient expressed good understanding.  Encouraged medication compliance and the importance of keeping scheduled follow up  appointments with me and any other providers.    Patient instructed to follow up with our office for results on any labs/imaging ordered during this visit.    Home care discussed  All questions answered  Patient verbalizes understanding and agrees to treatment plan.     Follow up: Return in about 1 month (around 4/11/2021) for physical, follow up on current illness.

## 2021-03-11 NOTE — PATIENT INSTRUCTIONS

## 2021-04-15 ENCOUNTER — OFFICE VISIT (OUTPATIENT)
Dept: INTERNAL MEDICINE | Facility: CLINIC | Age: 51
End: 2021-04-15

## 2021-04-15 VITALS
DIASTOLIC BLOOD PRESSURE: 76 MMHG | BODY MASS INDEX: 31.92 KG/M2 | WEIGHT: 203.4 LBS | OXYGEN SATURATION: 98 % | TEMPERATURE: 97.3 F | SYSTOLIC BLOOD PRESSURE: 124 MMHG | HEART RATE: 73 BPM | HEIGHT: 67 IN

## 2021-04-15 DIAGNOSIS — K21.9 CHRONIC GERD: ICD-10-CM

## 2021-04-15 DIAGNOSIS — I10 ESSENTIAL HYPERTENSION: Primary | ICD-10-CM

## 2021-04-15 DIAGNOSIS — R21 RASH: ICD-10-CM

## 2021-04-15 PROCEDURE — 99214 OFFICE O/P EST MOD 30 MIN: CPT | Performed by: FAMILY MEDICINE

## 2021-04-15 RX ORDER — NYSTATIN AND TRIAMCINOLONE ACETONIDE 100000; 1 [USP'U]/G; MG/G
OINTMENT TOPICAL 2 TIMES DAILY
Qty: 60 G | Refills: 1 | Status: SHIPPED | OUTPATIENT
Start: 2021-04-15

## 2021-04-15 RX ORDER — ALBUTEROL SULFATE 90 UG/1
AEROSOL, METERED RESPIRATORY (INHALATION)
COMMUNITY
Start: 2021-03-24 | End: 2022-05-16

## 2021-04-15 RX ORDER — PANTOPRAZOLE SODIUM 40 MG/1
40 TABLET, DELAYED RELEASE ORAL
Qty: 30 TABLET | Refills: 2 | Status: SHIPPED | OUTPATIENT
Start: 2021-04-15 | End: 2021-08-03 | Stop reason: SDUPTHER

## 2021-04-15 RX ORDER — AMLODIPINE BESYLATE 10 MG/1
10 TABLET ORAL DAILY
Qty: 30 TABLET | Refills: 1 | Status: SHIPPED | OUTPATIENT
Start: 2021-04-15 | End: 2021-08-03 | Stop reason: SDUPTHER

## 2021-04-15 NOTE — PATIENT INSTRUCTIONS
ãÑÖ ÇáÌÒÑ ÇáãÚÏí ÇáãÑíÆí áÏì ÇáÈÇáÛíä  Gastroesophageal Reflux Disease, Adult    íÚäí CHARLEEN (ÇáÇÑÊÌÇÚ ÇáãÚÏí ÇáãÑíÆí) ÊÏÝÞ ÇáÍãÖ ãä ãÚÏÊß Åáì ÇáÃäÈæÈ ÇáÐí íÕá ÇáÝã ÈÇáãÚÏÉ (ÇáãÑíÁ). ÝÇáØÚÇã íäÊÞá ÚÇÏÉð Åáì ÇáÃÓÝá ÎáÇá ÇáãÑíÁ æíÈÞì Ýí ÇáãÚÏÉ áíõåÖã. áßä ÚäÏ ÍÏæË ÇáÇÑÊÌÇÚ ÇáãÚÏí ÇáãÑíÆí¡ ÝÅä ÇáØÚÇã æÍãÖ ÇáãÚÏÉ íÑÊÝÚÇä áÃÚáì æíÏÎáÇä ÇáãÑíÁ. ÃãÇ ÅÐÇ ÊÝÇÞãÊ ÇáãÔßáÉ¡ ÝÞÏ ÊõÔÎÕ ÇáÍÇáÉ Úáì ÅäåÇ ãÑÖ ÇáÇÑÊÌÇÚ ÇáãÚÏí ÇáãÑíÆí. Ðáß ÅÐÇ ßÇä ÇáÇÑÊÌÇÚ:  • ßËíÑ ÇáÍÏæË.   • íÓÈÈ ÃÚÑÇÖðÇ ãÊßÑÑÉ Ãæ ÔÏíÏÉ.   • íÓÈÈ ãÔÇßá ãËá ÊáÝ ÈÇáãÑíÁ.  ÝÚäÏãÇ íáÇãÓ ÇáÍãÖ ÇáãÑíÁ¡ ÝÅäå ÞÏ íÓÈÈ ÃáãðÇ (ÇáÊåÇÈðÇ) Ýí ÇáãÑíÁ. æÈãÑæÑ ÇáæÞÊ¡ íãßä Ãä íÓÈÈ ãÑÖ ÇáÇÑÊÌÇÚ ÇáãÚÏí ÇáãÑíÆí ÙåæÑ ÝÊÍÇÊ ÕÛíÑÉ (ÞÑÍ) Úáì ÈØÇäÉ ÇáãÑíÁ.  ãÇ ÃÓÈÇÈ ÇáÍÇáÉ¿  ÊÍÏË åÐå ÇáÍÇáÉ ÈÓÈÈ ãÔßáÉ Ýí ÇáÚÖáÇÊ Èíä ÇáãÑíÁ æÇáãÚÏÉ (ÇáãÕÑÉ ÇáãÑíÆíÉ ÇáÓÝáíÉ¡ Ãæ LES). æÊäÛáÞ ÚÖáÉ ÇáãÕÑÉ ÇáãÑíÆíÉ ÇáÓÝáíÉ ÚÇÏÉð ÈÚÏ ãÑæÑ ÇáØÚÇã ãä ÎáÇá ÇáãÑíÁ Åáì ÇáãÚÏÉ. áßä ÚäÏãÇ ÊÖÚÝ ÚÖáÉ ÇáãÕÑÉ ÇáãÑíÆíÉ ÇáÓÝáíÉ Ãæ ÊÕÈÍ ÛíÑ ØÈíÚíÉ¡ ÝÅäåÇ áÇ ÊäÛáÞ ÇäÛÇáÇð ÓáíãðÇ¡ ããÇ íÓãÍ ÈÕÚæÏ ÇáØÚÇã æÍãÖ ÇáãÚÏÉ Åáì ÇáãÑíÁ.  æíãßä Ãä ÊÖÚÝ ÚÖáÉ ÇáãÕÑÉ ÇáãÑíÆíÉ ÇáÓÝáíÉ ãä ÎáÇá ãæÇÏ ÛÐÇÆíÉ ãÚíäÉ æÃÏæíÉ æÍÇáÇÊ ØÈíÉ¡ ÈãÇ Ýí Ðáß:  • ÇÓÊÎÏÇã ÇáÊÈÛ.  • ÇáÍãá.  • ÝÊÞ ÍÌÇÈí.  • ÊäÇæá ÇáßÍæáíÇÊ.  • ÈÚÖ ÇáãÃßæáÇÊ æÇáãÔÑæÈÇÊ ÇáãÚíäÉ¡ ãËá ÇáÞåæÉ æÇáÔíßæáÇÊÉ æÇáÈÕá æÇáäÚäÇÚ.  ãÇ ÚæÇãá ÒíÇÏÉ ÇáÎØÑ¿  íÒÏÇÏ ÇÍÊãÇá ÇáÅÕÇÈÉ ÈåÐå ÇáÍÇáÉ Ýí ÇáÍÇáÇÊ ÇáÊÇáíÉ:  • ÒíÇÏÉ ÇáæÒä.  • ÇáÅÕÇÈÉ ÈÇÖØÑÇÈ íÄËÑ Ýí ÇáÃäÓÌÉ ÇáÖÇãÉ.  • ÊäÇæá NSAID (ãÖÇÏÇÊ ÇáÇáÊåÇÈ ÇááÇÓÊíÑæíÏíÉ).  ãÇ ÚáÇãÇÊ ÇáÍÇáÉ Ãæ ÃÚÑÇÖåÇ¿  ÊÔãá ÃÚÑÇÖ åÐå ÇáÍÇáÉ ãÇ íáí:  • ÍÑÞÉ Ýí Ýã ÇáãÚÏÉ.  • ÕÚæÈÉ Ãæ Ãáã Ýí ÇáÈáÛ.  • ÇáÔÚæÑ ÈæÌæÏ ÌáØÉ Ýí ÇáÍáÞ.  • ÇáÅÍÓÇÓ ÈØÚã ãÑÇÑÉ Ýí ÇáÝã.  • ãÔßáÇÊ Ýí ÇáÊäÝÓ.  • æÌæÏ ßãíÉ ßÈíÉ ãä ÇááÚÇÈ.  • ÇáÅÕÇÈÉ ÈÇÖØÑÇÈÇÊ Ýí ÇáãÚÏÉ Ãæ ÇäÊÝÇÎ.  • ÊÌÔÄ.  • Ãáã ÈÇáÕÏÑ. æíãßä Ãä íÍÏË Ãáã ÇáÕÏÑ ÈÓÈÈ ÚÏÉ ÍÇáÇÊ ãÎÊáÝÉ. áÐá íÌÈ ÇáÍÑÕ Úáì ãÑÇÌÚÉ ãÞÏã ÇáÑÚÇíÉ ÇáÕÍíÉ ÇáãÊÇÈÚ áß ÅÐÇ ßäÊ ÊÔÚÑ ÈÃáã Ýí ÇáÕÏÑ.  • ÖíÞ ÇáäÝÓ ææÌæÏ ÕÝíÑ ÚäÏ ÇáÊäÝÓ.  • ÓÚÇá ãÓÊãÑ (ãÒãä) Ãæ ÓÚÇá Ýí ÃæÞÇÊ  Çááíá.  • Èáì ãíäÇÁ ÇáÃÓäÇä.  • ÝÞÏÇä ÇáæÒä.  ßíÝ ÊõÔÎÕ åÐå ÇáÍÇáÉ¿  íÊæáì ãÞÏã ÇáÑÚÇíÉ ÇáÕÍíÉ ÇáÎÇÕ Èß ÈÊÓÌíá ÇáÊÇÑíÎ ÇáãÑÖí æÅÌÑÇÁ ÝÍÕ ØÈí. áÊÍÏíÏ ãÇ ÅÐÇ ßäÊ ÊÚÇäí ãä ãÑÖ ÇáÌÒÑ ÇáãÚÏí ÇáãÑíÆí ÎÝíÝ Ãæ ÍÇÏ¡ ÞÏ íÍÊÇÌ ãÞÏã ÇáÑÚÇíÉ ÇáÕÍíÉ Åáì ãÑÇÞÈÉ ÇÓÊÌÇÈÊß ááÚáÇÌ. ßãÇ ÞÏ ÊõÌÑì áß ÃíÖðÇ ÝÍæÕ¡ ÈãÇ Ýí Ðáß:  • ÇÎÊÈÇÑ ÝÍÕ ÇáãÚÏÉ æÇáãÑíÁ ÈÇÓÊÎÏÇã ÌåÇÒ Èå ßÇãíÑÇ ÕÛíÑÉ (ÊäÙíÑ ÏÇÎáí).  • ÇÎÊÈÇÑ áÞíÇÓ ãÓÊæì ÇáÍãæÖÉ Ýí ÇáãÑíÁ.  • ÇÎÊÈÇÑ áÞíÇÓ ãÏì ÇáÖÛØ ÇáãæÌæÏ Úáì ÇáãÑíÁ.  • ÝÍÕ ÈáÚ ÇáÈÇÑíæã Ãæ ÈáÚ ÇáÈÇÑíæã ÇáãÚÏá áÝÍÕ Ôßá ÇáãÑíÁ æÍÌãå æßÝÇÁÉ Úãáå.  ßíÝ ÊõÚÇáÌ åÐå ÇáÍÇáÉ¿  íßãä ÇáåÏÝ ãä ÇáÚáÇÌ Ýí ãÓÇÚÏÊß Úáì ÊÎÝíÝ ÇáÃÚÑÇÖ æÇáæÞÇíÉ ãä ÇáãÖÇÚÝÇÊ. ÊÎÊáÝ ØÑíÞÉ ÚáÇÌ åÐå ÇáÍÇáÉ ÇÚÊãÇÏðÇ Úáì ãÏì ÍÏÉ ÇáÃÚÑÇÖ. æÞÏ íæÕí ãÞÏã ÇáÑÚÇíÉ ÇáÕÍíÉ ÈãÇ íáí:  • ÊÛííÑÇÊ Ýí äÙÇãß ÇáÛÐÇÆí.  • ÇáÃÏæíÉ.  • ÇáÌÑÇÍÉ.  íÌÈ ÇÊÈÇÚ åÐå ÇáÊÚáíãÇÊ Ýí ÇáãäÒá:  ÇáÃßá æÇáÔÑÈ    • íÌÈ ÇÊÈÇÚ ÇáäÙÇã ÇáÛÐÇÆí ÇáÐí ÃæÕì Èå ãõÞÏøöã ÇáÑÚÇíÉ ÇáÕÍíÉ ÇáãÊÇÈÚ áß. æåæ ãÇ ÞÏ íÔãá ÊÌäÈ ÈÚÖ ÇáãÃßæáÇÊ æÇáãÔÑæÈÇÊ¡ ãËá:  ? ÇáÞåæÉ æÇáÔÇí (ÈÇáßÇÝííä Ãæ ÇáÎÇáííä ãäå).  ? ÇáãÔÑæÈÇÊ ÇáãÍÊæíÉ Úáì ßÍæá.  ? ãÔÑæÈÇÊ ÇáØÇÞÉ æÇáãÔÑæÈÇÊ ÇáÑíÇÖíÉ.  ? ÇáãÔÑæÈÇÊ ÇáÛÇÒíÉ Ãæ ÇáÕæÏÇ.  ? ÇáÔæßæáÇÊÉ æÇáßÇßÇæ.  ? äßåÇÊ ÇáÝáÝá æÇáäÚäÇÚ.  ? ÇáËæã æÇáÈÕá.  ? ÇáÝÌá ÇáÍÇÑ.  ? ÇáÃØÚãÉ ÇáÍÑíÝÉ Ãæ ÇáÍãÖíÉ¡ æãä ÈíäåÇ ÇáÈåÇÑÇÊ æãÓÍæÞ ÇáÝáÝá ÇáÍÇÑ æãÓÍæÞ ÇáßÇÑí æÇáÎá æÇáÕáÕÉ ÇáÍÇÑÉ æÕáÕÉ ÇáÈÇÑÈßíæ.  ? ÚÕÇÆÑ ÇáÝæÇßå ÇáÍãÖíÉº ãËá ÇáÈÑÊÞÇá æÇááíãæä æÇááíãæä ÇáÍÇãÖ.  ? ÇáÃØÚãÉ ÇáÊí ÊÚÊãÏ Úáì ÇáØãÇØã ãËá ÇáÕáÕÉ æÇáÝáÝá ÇáÍÇÑ æÇáÓáØÉ æÇáÈíÊÒÇ ÇáãÒæÏÉ ÈÇáÕáÕÉ ÇáÍãÑÇÁ.  ? ÇáÃØÚãÉ ÇáãÞáíÉ æÇáÛäíÉ ÈÇáÏåæä¡ ãËá ÇáÏæäÇÊÓ æÇáÈØÇØÓ ÇáãÞáíÉ æÔÑÇÆÍ ÇáÈØÇØÓ æÇáÊÊÈíáÇÊ ÚÇáíÉ ÇáÏåæä.  ? ÇááÍæã ÚÇáíÉ ÇáÏåæä¡ ãËá ÇáåæÊ ÏæÌ æÇááÍæã ÇáÍãÑÇÁ æÇáÈíÖÇÁ ÇáÛäíÉ ÈÇáÏåæä¡ ãËá ÔÑÇÆÍ ÇáÖáæÚ æÇáÓæÓíÓ æÇáßÝá æÇááÍã ÇáãÞÏÏ.  ? ãäÊÌÇÊ ÇáÃáÈÇä ÚÇáíÉ ÇáÏåæä¡ ãËá ÇáÍáíÈ ßÇãá ÇáÏÓã æÇáÒÈÏ æÇáÌÈä ÇáßÑíãí.  • íÌÈ ÊäÇæá æÌÈÇÊ ÕÛíÑÉ æãÊßÑÑÉ ÈÏáÇð ãä ÇáæÌÈÇÊ ÇáßÈíÑÉ.  • íÌÈ ÊÌäÈ ÔÑÈ ßãíÇÊ ßÈíÑÉ ãä ÇáÓæÇÆá ãÚ ÇáæÌÈÇÊ.  • íÌÈ ÊÌäÈ ÊäÇæá æÌÈÇÊ ÎáÇá 2-3 ÓÇÚÇÊ  ÞÈá Çáäæã.  • íÌÈ ÊÌäÈ ÇáÇÓÊáÞÇÁ ãÈÇÔÑÉ ÈÚÏ ÇáÃßá.  • ýíÊÚíä ÚÏã ããÇÑÓÉ ÇáÊãÑíäÇÊ ÇáÑíÇÖíÉ ÈÚÏ ÇáÃßá ãÈÇÔÑÉ.  äãØ ÇáÍíÇÉ    • íÊÚíä ÚÏã ÇÓÊÎÏÇã ãäÊÌÇÊ ÊÍÊæí Úáì ÇáäíßæÊíä Ãæ ÇáÊÈÛ¡ ãËá ÇáÓÌÇÆÑ æÇáÓÌÇÆÑ ÇáÅáßÊÑæäíÉ æÊÈÛ ÇáãÖÛ. íãßä ÇÓÊÔÇÑÉ ãÞÏã ÇáÑÚÇíÉ ÇáÕÍíÉ ÅÐÇ ÇÍÊÌÊ Åáì ÇáãÓÇÚÏÉ ááÅÞáÇÚ Úä ÇáÊÏÎíä.  • íÌÈ ãÍÇæáÉ ÇáÊÞáíá ãä ÇáÖÛæØ ÇáÊí ÊÊÚÑÖ áåÇ æãÓÊæì ÇáÊæÊÑ áÏíß ÈÇÊÈÇÚ æÓÇÆá ãËá ããÇÑÓÉ ÇáíæÌÇ Ãæ ÇáÊÃãá. ÃãÇ Ýí ÍÇáÉ ÇáÍÇÌÉ áãÓÇÚÏÉ áÎÝÖ ãÓÊæì ÇáÅÌåÇÏ¡ Ýíãßä ÇÓÊÔÇÑÉ ãõÞÏã ÇáÑÚÇíÉ ÇáÕÍíÉ.  • Ýí ÍÇáÉ ÒíÇÏÉ ÇáæÒä¡ ÝíÌÈ ÎÝÖ ÇáæÒä Åáì ÇáæÒä ÇáÕÍí. íÌÈ ÇÓÊÔÇÑÉ æØáÈ ÊæÌíåÇÊ ãÞÏã ÇáÑÚÇíÉ ÇáÕÍíÉ Íæá ßíÝíÉ ÎÝÖ ÇáæÒä ÈØÑíÞÉ ÕÍíÉ æÂãäÉ.  ÊÚáíãÇÊ ÚÇãÉ  • íÊÚíä ÇáÇäÊÈÇå áÌãíÚ ÇáÊÛíÑÇÊ ÇáÊí ÊØÑÃ Úáì ÇáÃÚÑÇÖ ÇáÊí ÊÚÇäíåÇ.  • íÊÚíä ÊäÇæá ÇáÃÏæíÉ ÇáÊí ÊõÕÑÝ ÈæÕÝÉ ØÈíÉ Ãæ Ïæä æÕÝÉ ØÈíÉ æÝÞðÇ áãÇ íÎÈÑßö Èå ãÞÏã ÇáÑÚÇíÉ ÇáÕÍíÉ ÇáÎÇÕ Èß. íÊÚíä ÚÏã ÊäÇæá ÇáÃÓÈÑíä Ãæ ÇáÃíÈæÈÑæÝíä Ãæ ÛíÑå ãä ãÖÇÏÇÊ ÇáÇáÊåÇÈÇÊ ÇááÇÓÊíÑæíÏíÉ ÅáÇ ÈãæÇÝÞÉ ãÞÏã ÇáÑÚÇíÉ.  • íÌÈ ÇÑÊÏÇÁ ãáÇÈÓ æÇÓÚÉ. æíÌÈ ÚÏã ÇÑÊÏÇÁ ÔíÁ ÖíÞ Íæá ÇáÎÕÑ ÞÏ íÖÛØ Úáì ÇáãÚÏÉ.  • íÌÈ ÑÝÚ (ÅÚáÇÁ) ãÞÏãÉ ÝÑÇÔß áãÓÇÝÉ 6 ÈæÕÇÊ ÈÇáÊÞÑíÈ (15 Óã).  • ßÐáß ÊÌäÈ ÇáÇäÍäÇÁ ÅÐÇ ßÇä íÄÏí Åáì ÒíÇÏÉ ÔÏÉ ÇáÃÚÑÇÖ.  • íÊÚíä ÇáÇáÊÒÇã ÈÌãíÚ ãæÇÚíÏ ÇáãÊÇÈÚÉ æÝÞðÇ áÊæÌíåÇÊ ãÞÏã ÇáÑÚÇíÉ ÇáÕÍíÉ. ÝåÐÇ ÃãÑ ãåã.  íÌÈ ÇáÇÊÕÇá ÈãÞÏã ÇáÑÚÇíÉ ÇáÕÍíÉ Ýí ÇáÍÇáÇÊ ÇáÊÇáíÉ:  • Ýí ÍÇáÉ ÇáÅÕÇÈÉ ÈÃí ããÇ íáí:  ? ÃÚÑÇÖ ÌÏíÏÉ.  ? ÝÞÏÇä ÇáæÒä Ïæä ÓÈÈ ãÝåæã.  ? ÕÚæÈÉ Ýí ÇáÈáÚ Ãæ Ãáã ÚäÏ ÇáÈáÚ.  ? æÌæÏ ÕæÊ ÃÒíÒ ÚäÏ ÇáÊäÝÓ Ãæ ÓÚÇá ãÓÊãÑ.  ? ÈÍÉ Ýí ÇáÕæÊ.  • ÚÏã ÇáÔÚæÑ ÈÊÍÓä ãÚ ÊáÞí ÇáÚáÇÌ.  íÊÚíä ØáÈ ÇáãÓÇÚÏÉ ÝæÑðÇ Ýí ÇáÍÇáÇÊ ÇáÊÇáíÉ:  • ÇáÔÚæÑ ÈÃáã Ýí ÇáÐÑÇÚíä Ãæ ÇáÚäÞ Ãæ ÇáÝß Ãæ ÇáÃÓäÇä Ãæ ÇáÙåÑ.  • ÇáÊÚÑÞ¡ Ãæ ÇáÔÚæÑ ÈÏæÇÑ Ãæ ÏæÎÉ.  • ÇáÅÕÇÈÉ ÈÃáã Ýí ÇáÕÏÑ Ãæ ÖíÞ Ýí ÇáÊäÝÓ.  • ÇáÊÞíÄ ÞíÆðÇ íÔÈå ÇáÏã Ãæ ÊÝá ÇáÞåæÉ.  • ÇáÅÛãÇÁ.  • ÅÎÑÇÌ ÈÑÇÒ ãÏãã Ãæ ÃÓæÏ.  • ÚÏã ÇáÞÏÑÉ Úáì ÇáÈáÚ Ãæ ÇáÔÑÈ Ãæ ÇáÃßá.  ãáÎÕ  • íÍÏË ÇÑÊÌÇÚ ÇáãÚÏÉ ÇáãÑíÆí ÚäÏ ÕÚæÏ ÇáÍãÖ ãä ÇáãÚÏÉ Åáì ÏÇÎá ÇáãÑíÁ. ãÑÖ ÇáÇÑÊÌÇÚ ÇáãÚÏí ÇáãÑíÆí ÍÇáÉ íÍÏË ÝíåÇ ÇáÇÑÊÌÇÚ ßËíÑðÇ¡ Ãæ  íÓÈÈ ÃÚÑÇÖðÇ ãÊßÑÑÉ Ãæ ÔÏíÏÉ¡ Ãæ íÓÈÈ ãÔÇßá ãËá ÊáÝ ÈÇáãÑíÁ.  • ÊÎÊáÝ ØÑíÞÉ ÚáÇÌ åÐå ÇáÍÇáÉ ÇÚÊãÇÏðÇ Úáì ãÏì ÍÏÉ ÇáÃÚÑÇÖ. æÞÏ íæÕí ãÞÏã ÇáÑÚÇíÉ ÇáÕÍíÉ ÈÅÌÑÇÁ ÊÛííÑÇÊ Ýí ÇáäÙÇã ÇáÛÐÇÆí æäãØ ÇáÍíÇÉ¡ Ãæ íÕÝ ÏæÇÁð¡ Ãæ íæÕí ÈÅÌÑÇÁ ÇáÌÑÇÍÉ.  • íÌÈ ÇáÇÊÕÇá ÈãõÞÏã ÇáÑÚÇíÉ ÇáÕÍíÉ ÅÐÇ ÙåÑÊ Úáíß ÃÚÑÇÖ ÌÏíÏÉ Ãæ ÒÇÏÊ ÔÏÉ ÇáÃÚÑÇÖ ÇáÊí ÊÚÇäí ãäåÇ.  • íÊÚíä ÊäÇæá ÇáÃÏæíÉ ÇáÊí ÊõÕÑÝ ÈæÕÝÉ ØÈíÉ Ãæ Ïæä æÕÝÉ ØÈíÉ æÝÞðÇ áãÇ íÎÈÑßö Èå ãÞÏã ÇáÑÚÇíÉ ÇáÕÍíÉ ÇáÎÇÕ Èß. íÊÚíä ÚÏã ÊäÇæá ÇáÃÓÈÑíä Ãæ ÇáÃíÈæÈÑæÝíä Ãæ ÛíÑå ãä ãÖÇÏÇÊ ÇáÇáÊåÇÈÇÊ ÇááÇÓÊíÑæíÏíÉ ÅáÇ ÈãæÇÝÞÉ ãÞÏã ÇáÑÚÇíÉ.  • íÊÚíä ÇáÇáÊÒÇã ÈÌãíÚ ãæÇÚíÏ ÇáãÊÇÈÚÉ æÝÞðÇ áÊæÌíåÇÊ ãÞÏã ÇáÑÚÇíÉ ÇáÕÍíÉ. ÝåÐÇ ÃãÑ ãåã.  áíÓ ÇáåÏÝ ãä åÐå ÇáãÚáæãÇÊ Ãä Êßæä ÈÏíáÇð ááÅÑÔÇÏÇÊ ÇáÊí íÞÏãåÇ ãæÝÑ ÇáÑÚÇíÉ ÇáÕÍíÉ. ÊÃßÏ ãä ãäÇÞÔÉ ÃíÉ ÃÓÆáÉ ÊÏæÑ Ýí Ðåäß ãÚ ãæÝÑ ÇáÑÚÇíÉ ÇáÕÍíÉ.þ  Document Revised: 07/26/2019 Document Reviewed: 07/26/2019  [employer] Patient Education © 2021 [employer]

## 2021-04-15 NOTE — PROGRESS NOTES
Subjective     Eric Johnson is a 50 y.o. male.     Chief Complaint   Patient presents with   • Hypertension     follow up   • Heartburn     medicine not helping       History of Present Illness     HTN  -follow-up of hypertension  -BP check at home range 120-140/60 with some readings in 150/90  -on low sodium diet   -associated signs and symptoms: decrease sexual drive   -medication compliance: taking as prescribed  -Denies  blurred vision, chest pain, neck pain, orthopnea or shortness of breath.    Skin rash over the Lt forearm , itchy for 2 months        Gastroesophageal Reflux   Patient complains of belching, heartburn and nausea.   This is a recurrent problem.  The problem occurs frequently. The heartburn duration is several minutes. The heartburn is located in the substernum.   Sx improved after he got Rx for the H.pylori. then sx comes back.  Has EGD at  in 2020, he dose not know the result yet  The heartburn does not wake pt from sleep. The heartburn limits pt activity.   The heartburn changes with position.   The symptoms are aggravated by certain foods, lying down and caffeine.   Pertinent negatives include no melena.   There are no known risk factors.   He is not on any medication          The following portions of the patient's history were reviewed and updated as appropriate: allergies, current medications, past family history, past medical history, past social history, past surgical history and problem list.        Review of Systems   Cardiovascular: Negative for chest pain, palpitations and leg swelling.   Gastrointestinal: Positive for GERD. Negative for constipation, diarrhea, nausea, vomiting and indigestion.   Skin: Positive for rash.   Neurological: Negative for headache.       Vitals:    04/15/21 1239   BP: 124/76   Pulse: 73   Temp: 97.3 °F (36.3 °C)   SpO2: 98%           04/15/21  1239   Weight: 92.3 kg (203 lb 6.4 oz)         Body mass index is 31.85 kg/m².      Current Outpatient  Medications   Medication Sig Dispense Refill   • aspirin (aspirin) 81 MG EC tablet Take 1 tablet by mouth Daily. 90 tablet 1   • atorvastatin (Lipitor) 10 MG tablet Take 1 tablet by mouth Daily. 90 tablet 1   • Breo Ellipta 200-25 MCG/INH inhaler      • Dupixent 300 MG/2ML solution prefilled syringe      • fluticasone (FLONASE) 50 MCG/ACT nasal spray 2 sprays into the nostril(s) as directed by provider Daily.     • lisinopril (PRINIVIL,ZESTRIL) 10 MG tablet Take 1 tablet by mouth Daily. 30 tablet 3   • montelukast (SINGULAIR) 10 MG tablet Take 10 mg by mouth Every Night.     • triamcinolone (KENALOG) 0.1 % ointment Apply  topically to the appropriate area as directed 2 (Two) Times a Day. 30 g 1   • amLODIPine (NORVASC) 10 MG tablet Take 1 tablet by mouth Daily. 30 tablet 1   • nystatin-triamcinolone (MYCOLOG) 515698-4.1 UNIT/GM-% ointment Apply  topically to the appropriate area as directed 2 (Two) Times a Day. 60 g 1   • pantoprazole (PROTONIX) 40 MG EC tablet Take 1 tablet by mouth Every Morning Before Breakfast. 30 tablet 2   • Ventolin  (90 Base) MCG/ACT inhaler        No current facility-administered medications for this visit.                Objective   Physical Exam  Vitals and nursing note reviewed.   Constitutional:       Appearance: He is not ill-appearing or diaphoretic.   HENT:      Mouth/Throat:      Mouth: Mucous membranes are moist.   Eyes:      Conjunctiva/sclera: Conjunctivae normal.   Cardiovascular:      Rate and Rhythm: Normal rate and regular rhythm.      Heart sounds: Normal heart sounds. No murmur heard.   No friction rub. No gallop.    Pulmonary:      Effort: Pulmonary effort is normal. No respiratory distress.      Breath sounds: Normal breath sounds. No stridor. No wheezing or rhonchi.   Musculoskeletal:      Cervical back: Neck supple.   Skin:     General: Skin is warm.      Findings: Rash present.      Comments: Lt forearm rash    Neurological:      Mental Status: He is alert and  oriented to person, place, and time.   Psychiatric:         Mood and Affect: Mood normal.         Behavior: Behavior normal.         Thought Content: Thought content normal.           Assessment/Plan   Diagnoses and all orders for this visit:    1. Essential hypertension (Primary);  - Will d/c coreg as he noticed decrease in sexual drive when he starts taking it, will switch to  -     amLODIPine (NORVASC) 10 MG tablet; Take 1 tablet by mouth Daily.  Dispense: 30 tablet; Refill: 1  - Continue Lisinopril 10 mg   Encouraged patient to maintain a heart healthy diet/DASH diet, exercise as tolerated, limit sodium intake to no more than 1,500mg/day, limit alcohol intake, maintain medication compliance and practice relaxation techniques to reduce stress. To ER for chest pain or signs/symptoms of stroke.     2. Rash  -     nystatin-triamcinolone (MYCOLOG) 326396-0.1 UNIT/GM-% ointment; Apply  topically to the appropriate area as directed 2 (Two) Times a Day.  Dispense: 60 g; Refill: 1    3. Chronic GERD;  - Will start on;  -     pantoprazole (PROTONIX) 40 MG EC tablet; Take 1 tablet by mouth Every Morning Before Breakfast.  Dispense: 30 tablet; Refill: 2  Recommendations discussed with patient for decreasing CHARLEEN symptoms: eliminate foods that may trigger symptoms, avoid alcohol at bedtime, and avoid lying down after 3 hours of eating.  Common irritating foods include: chocolate, garlic, onions, citrus fruits, coffee, alcohol, highly seasoned foods and carbonated beverages.       I have fully discussed the nature of the medical condition(s) risks, complications, management, safe and proper use of medications.   I have discussed the SIDE EFFECT OF MEDICATION and importance TO report any side effect , the patient expressed good understanding.  Encouraged medication compliance and the importance of keeping scheduled follow up appointments with me and any other providers.    Patient instructed to follow up with our office for  results on any labs/imaging ordered during this visit.    Home care discussed  All questions answered  Patient verbalizes understanding and agrees to treatment plan.     Follow up: Return in about 1 month (around 5/15/2021) for follow up on current illness.

## 2021-05-05 ENCOUNTER — OFFICE VISIT (OUTPATIENT)
Dept: INTERNAL MEDICINE | Facility: CLINIC | Age: 51
End: 2021-05-05

## 2021-05-05 ENCOUNTER — LAB (OUTPATIENT)
Dept: LAB | Facility: HOSPITAL | Age: 51
End: 2021-05-05

## 2021-05-05 VITALS
WEIGHT: 204.47 LBS | BODY MASS INDEX: 32.09 KG/M2 | HEART RATE: 87 BPM | HEIGHT: 67 IN | OXYGEN SATURATION: 97 % | DIASTOLIC BLOOD PRESSURE: 84 MMHG | TEMPERATURE: 97.7 F | SYSTOLIC BLOOD PRESSURE: 126 MMHG

## 2021-05-05 DIAGNOSIS — R61 NIGHT SWEAT: Primary | ICD-10-CM

## 2021-05-05 DIAGNOSIS — J30.2 SEASONAL ALLERGIES: ICD-10-CM

## 2021-05-05 DIAGNOSIS — R61 NIGHT SWEAT: ICD-10-CM

## 2021-05-05 DIAGNOSIS — J02.8 PHARYNGITIS DUE TO OTHER ORGANISM: ICD-10-CM

## 2021-05-05 LAB
DEPRECATED RDW RBC AUTO: 40 FL (ref 37–54)
ERYTHROCYTE [DISTWIDTH] IN BLOOD BY AUTOMATED COUNT: 12.9 % (ref 12.3–15.4)
HCT VFR BLD AUTO: 42.6 % (ref 37.5–51)
HGB BLD-MCNC: 14.3 G/DL (ref 13–17.7)
MCH RBC QN AUTO: 29.3 PG (ref 26.6–33)
MCHC RBC AUTO-ENTMCNC: 33.6 G/DL (ref 31.5–35.7)
MCV RBC AUTO: 87.3 FL (ref 79–97)
PLATELET # BLD AUTO: 258 10*3/MM3 (ref 140–450)
PMV BLD AUTO: 10.9 FL (ref 6–12)
RBC # BLD AUTO: 4.88 10*6/MM3 (ref 4.14–5.8)
WBC # BLD AUTO: 6.92 10*3/MM3 (ref 3.4–10.8)

## 2021-05-05 PROCEDURE — 99214 OFFICE O/P EST MOD 30 MIN: CPT | Performed by: FAMILY MEDICINE

## 2021-05-05 PROCEDURE — 85027 COMPLETE CBC AUTOMATED: CPT | Performed by: FAMILY MEDICINE

## 2021-05-05 RX ORDER — LORATADINE 10 MG/1
10 TABLET ORAL DAILY
Qty: 30 TABLET | Refills: 0 | Status: SHIPPED | OUTPATIENT
Start: 2021-05-05 | End: 2021-06-07 | Stop reason: SDUPTHER

## 2021-05-05 RX ORDER — AMOXICILLIN 500 MG/1
1000 CAPSULE ORAL 2 TIMES DAILY
Qty: 20 CAPSULE | Refills: 0 | Status: SHIPPED | OUTPATIENT
Start: 2021-05-05 | End: 2021-06-07

## 2021-05-07 ENCOUNTER — TELEPHONE (OUTPATIENT)
Dept: INTERNAL MEDICINE | Facility: CLINIC | Age: 51
End: 2021-05-07

## 2021-05-11 NOTE — TELEPHONE ENCOUNTER
2nd attempt called pt with  assistance lft vm to cb                    ----- Message from Tj Caba MD sent at 5/6/2021  5:22 PM EDT -----  PLEASE call for normal results

## 2021-06-07 ENCOUNTER — OFFICE VISIT (OUTPATIENT)
Dept: INTERNAL MEDICINE | Facility: CLINIC | Age: 51
End: 2021-06-07

## 2021-06-07 VITALS
DIASTOLIC BLOOD PRESSURE: 88 MMHG | TEMPERATURE: 97.5 F | BODY MASS INDEX: 32.49 KG/M2 | HEART RATE: 90 BPM | HEIGHT: 67 IN | SYSTOLIC BLOOD PRESSURE: 138 MMHG | OXYGEN SATURATION: 96 % | WEIGHT: 207 LBS

## 2021-06-07 DIAGNOSIS — E78.2 MIXED HYPERLIPIDEMIA: Primary | ICD-10-CM

## 2021-06-07 DIAGNOSIS — J30.2 SEASONAL ALLERGIES: ICD-10-CM

## 2021-06-07 DIAGNOSIS — R73.03 PREDIABETES: ICD-10-CM

## 2021-06-07 PROCEDURE — 99214 OFFICE O/P EST MOD 30 MIN: CPT | Performed by: FAMILY MEDICINE

## 2021-06-07 RX ORDER — LORATADINE 10 MG/1
10 TABLET ORAL DAILY
Qty: 90 TABLET | Refills: 1 | Status: SHIPPED | OUTPATIENT
Start: 2021-06-07 | End: 2021-08-03 | Stop reason: SDUPTHER

## 2021-06-07 RX ORDER — MONTELUKAST SODIUM 10 MG/1
10 TABLET ORAL NIGHTLY
Qty: 90 TABLET | Refills: 1 | Status: SHIPPED | OUTPATIENT
Start: 2021-06-07 | End: 2021-08-03 | Stop reason: SDUPTHER

## 2021-06-07 RX ORDER — FLUTICASONE PROPIONATE 50 MCG
2 SPRAY, SUSPENSION (ML) NASAL DAILY
Qty: 16 G | Refills: 5 | Status: SHIPPED | OUTPATIENT
Start: 2021-06-07 | End: 2021-08-03 | Stop reason: SDUPTHER

## 2021-06-07 NOTE — PROGRESS NOTES
Subjective     Eric Johnson is a 51 y.o. male.     Chief Complaint   Patient presents with   • Follow-up     1 month f/u        History of Present Illness     Pt with known seasonal allergies and asthma, was following with allergist at .  He is c/o on/off itchy throat and ear that last for few minutes, with congestion , dry cough, runny nose.  He feels as if there is some thing in his throat that needs to be cleared with tingling sensation  No F,C   No wheezing or SOB  Not smoker , no alcohol intake   No wt loss  No voice changes     Prediabetic; eats healthy , do weekly exercise   Last A1c was;  Lab Results   Component Value Date    HGBA1C 6.10 (H) 03/08/2021            Hyperlipidemia  Last lipid tests were reviewed showed .  Eating healthy , doing exercise   Pertinent negatives include no chest pain or shortness of breath.   Current antihyperlipidemic treatment includes statins.    Risk factors for coronary artery disease include dyslipidemia.   Lab Results   Component Value Date    CHOL 190 03/08/2021    TRIG 128 03/08/2021    HDL 47 03/08/2021     (H) 03/08/2021           The following portions of the patient's history were reviewed and updated as appropriate: allergies, current medications, past family history, past medical history, past social history, past surgical history and problem list.        Review of Systems   Constitutional: Negative for chills and fever.   HENT: Positive for congestion, ear pain and rhinorrhea. Negative for ear discharge, trouble swallowing and voice change.    Respiratory: Negative for cough, shortness of breath, wheezing and stridor.        Vitals:    06/07/21 1028   BP: 138/88   Pulse: 90   Temp: 97.5 °F (36.4 °C)   SpO2: 96%           06/07/21  1028   Weight: 93.9 kg (207 lb)         Body mass index is 32.41 kg/m².      Current Outpatient Medications   Medication Sig Dispense Refill   • amLODIPine (NORVASC) 10 MG tablet Take 1 tablet by mouth Daily. 30  tablet 1   • aspirin (aspirin) 81 MG EC tablet Take 1 tablet by mouth Daily. 90 tablet 1   • atorvastatin (Lipitor) 10 MG tablet Take 1 tablet by mouth Daily. 90 tablet 1   • Breo Ellipta 200-25 MCG/INH inhaler      • Dupixent 300 MG/2ML solution prefilled syringe      • fluticasone (FLONASE) 50 MCG/ACT nasal spray 2 sprays into the nostril(s) as directed by provider Daily. 16 g 5   • lisinopril (PRINIVIL,ZESTRIL) 10 MG tablet Take 1 tablet by mouth Daily. 30 tablet 3   • loratadine (Claritin) 10 MG tablet Take 1 tablet by mouth Daily. 90 tablet 1   • montelukast (SINGULAIR) 10 MG tablet Take 1 tablet by mouth Every Night. 90 tablet 1   • nystatin-triamcinolone (MYCOLOG) 236284-9.1 UNIT/GM-% ointment Apply  topically to the appropriate area as directed 2 (Two) Times a Day. 60 g 1   • pantoprazole (PROTONIX) 40 MG EC tablet Take 1 tablet by mouth Every Morning Before Breakfast. 30 tablet 2   • Ventolin  (90 Base) MCG/ACT inhaler        No current facility-administered medications for this visit.                Objective   Physical Exam  Vitals and nursing note reviewed.   Constitutional:       Appearance: He is well-developed. He is not ill-appearing or diaphoretic.   HENT:      Head: Normocephalic.      Right Ear: Tympanic membrane, ear canal and external ear normal.      Left Ear: Tympanic membrane, ear canal and external ear normal.      Nose: Congestion and rhinorrhea present.      Mouth/Throat:      Mouth: Mucous membranes are moist.      Pharynx: Oropharynx is clear. No oropharyngeal exudate or posterior oropharyngeal erythema.   Eyes:      Conjunctiva/sclera: Conjunctivae normal.   Neck:      Thyroid: No thyromegaly.   Cardiovascular:      Rate and Rhythm: Normal rate and regular rhythm.      Heart sounds: Normal heart sounds. No murmur heard.   No friction rub. No gallop.    Pulmonary:      Effort: Pulmonary effort is normal. No respiratory distress.      Breath sounds: Normal breath sounds. No  stridor. No wheezing, rhonchi or rales.   Musculoskeletal:         General: Normal range of motion.      Cervical back: Neck supple.   Skin:     General: Skin is warm.      Findings: No erythema or rash.   Neurological:      Mental Status: He is alert and oriented to person, place, and time.      Motor: No abnormal muscle tone.   Psychiatric:         Mood and Affect: Mood normal.         Behavior: Behavior normal.         Thought Content: Thought content normal.         Judgment: Judgment normal.           Assessment/Plan   Diagnoses and all orders for this visit:    1. Mixed hyperlipidemia (Primary)  -     Lipid Panel; Future  - Continue statin and diet with exercise     2. Seasonal allergies  -     montelukast (SINGULAIR) 10 MG tablet; Take 1 tablet by mouth Every Night.  Dispense: 90 tablet; Refill: 1  -     loratadine (Claritin) 10 MG tablet; Take 1 tablet by mouth Daily.  Dispense: 90 tablet; Refill: 1  -     fluticasone (FLONASE) 50 MCG/ACT nasal spray; 2 sprays into the nostril(s) as directed by provider Daily.  Dispense: 16 g; Refill: 5    3. Prediabetes  -     Hemoglobin A1c; Future  - Continue diet with exercise         I have fully discussed the nature of the medical condition(s) risks, complications, management, safe and proper use of medications.   I have discussed the SIDE EFFECT OF MEDICATION and importance TO report any side effect , the patient expressed good understanding.  Encouraged medication compliance and the importance of keeping scheduled follow up appointments with me and any other providers.    Patient instructed to follow up with our office for results on any labs/imaging ordered during this visit.    Home care discussed  All questions answered  Patient verbalizes understanding and agrees to treatment plan.     Follow up: Return in about 1 month (around 7/7/2021) for follow up on current illness, make sure to get blood work done before the appointment .

## 2021-06-07 NOTE — PATIENT INSTRUCTIONS
ÇáúÊöåÇÈõ ÇáÃóäúÝö ÇáÃóÑóÌöíø áÏì ÇáÈÇáÛíä  Allergic Rhinitis, Adult    ÇáúÊöåÇÈõ ÇáÃóäúÝö ÇáÃóÑóÌöíø ÊÝÇÚáÇÊ ÍÓÇÓíÉ ÊÄËÑ Úáì ÇáÛÔÇÁ ÇáãÎÇØí ÏÇÎá ÇáÃäÝ. æåÐÇ åæ ÇáäÓíÌ ÇáÐí íÝÑÒ ÇáãÎÇØ.  íæÌÏ äæÚÇä ãä ÇáúÊöåÇÈ ÇáÃóäúÝö ÇáÃóÑóÌöíø:  • ãæÓãí. æíõÓãì åÐÇ ÇáäæÚ ÃíÖðÇ Íãì ÇáÞÔ æáÇ íÍÏË ÅáÇ Ýí ÝÕæá ãÚíäÉ.  • ÏÇÆã. íãßä Ãä íÍÏË åÐÇ ÇáäæÚ Ýí Ãí æÞÊ ãä ÇáÚÇã.  ÇáÊåÇÈ ÇáÃäÝ ÇáÃÑÌí áÇ íãßä Ãä íäÊÞá ãä ÔÎÕ áÂÎÑ. æÞÏ Êßæä Êáß ÇáãÔßáÉ ÈÓíØÉ Çæ ãÊæÓØÉ Ãæ ÔÏíÏÉ. æÞÏ ÊÍÏË ÇáÅÕÇÈÉ Èå Ýí Ãí Óä¡ æÞÏ íÎÊÝí ãÚ ÇáÊÞÏã Ýí ÇáÚãÑ.  ãÇ ÃÓÈÇÈ åÐå ÇáÍÇáÉ¿  ÊÍÏË Êáß ÇáÍÇáÉ ÚÇÏÉ ÈÓÈÈ ãÓÈÈÇÊ ÇáÍÓÇÓíÉ. æßÐáß ÊõÓÈÈåÇ ÌãíÚ ÇáãæÇÏ ÇáÊí íãßä Ãä ÊÓÈÈ ÊÝÇÚá ÇáÍÓÇÓíÉ. ÞÏ ÊÎÊáÝ ãÓÈÈÇÊ ÇáÍÓÇÓíÉ Èíä ÇáÊåÇÈ ÇáÃäÝ ÇáÃÑÌí ÇáãæÓãí æÇáÊåÇÈ ÇáÃäÝ ÇáÃÑÌí ÇáÏÇÆã.  • ÝÇáÊåÇÈ ÇáÃäÝ ÇáÃÑÌí ÇáãæÓãí ÊÓÈÈå ÍÈæÈ ÇááÞÇÍ. æíãßä Ãä ÊÃÊí Êáß ÇáÍÈæÈ ãä ÇáÃÚÔÇÈ æÇáÃÔÌÇÑ æÇáÃÚÔÇÈ ÇáÖÇÑÉ.  • ÞÏ íõËÇÑ ÇáÊåÇÈ ÇáÃäÝ ÇáÃÑÌí ÇáÏÇÆã ÈÓÈÈ:  ? ÚË ÇáÛÈÇÑ.  ? ÈÑæÊíäÇÊ ãÚíäÉ ÊæÌÏ Ýí Èæá ÇáÍíæÇäÇÊ ÇáÃáíÝÉ Ãæ áÚÇÈåÇ Ãæ ÇáÞÔÑ ÇáãÊÓÇÞØ ãäåÇ. æåÐå ÎáÇíÇ ãíÊÉ ãä ÇáÌáÏ.  ? ÇáÏÎÇä Ãæ ÇáÚÝä Ãæ ÚæÇÏã ÇáÓíÇÑÇÊ.  ãÇ ÚæÇãá ÒíÇÏÉ ÇáÎØÑ¿  ÊÒÏÇÏ ÇÍÊãÇáÇÊ ÇáÅÕÇÈÉ ÈåÐå ÇáÍÇáÉ ÚäÏ æÌæÏ ÅÕÇÈÇÊ ÃÎÑì Ýí ÇáÚÇÆáÉ ÈäæÚ ãä ÃäæÇÚ ÇáÍÓÇÓíÉ Ãæ ÇáÍÇáÇÊ ÇáÕÍíÉ ÇáãÊÚáÞÉ ÈÇáÍÓÇÓíÉ¡ ãËá:  • ÇáúÊöåÇÈ ÇáãõáúÊóÍöãóÉö ÇáÃóÑóÌöíõø.æåÐÇ ÇáÊåÇÈ íÕíÈ ÃÌÒÇÁ ãä ÇáÚíäíä æÇáÌÝæä.  • ÇáÑÈæ. æåÐå ÇáÍÇáÉ ÊÕíÈ ÇáÑÆÊíä æÊÓÈÈ ÕÚæÈÉ Ýí ÇáÊäÝÓ.  • ÇáÊåÇÈ ÇáÌáÏ ÇáÊÃÊÈí Ãæ ÇáÅßÒíãÇ. æåÐÇ ÇáÊåÇÈ Øæíá ÇáÃãÏ (ãÒãä) íÕíÈ ÇáÌáÏ.  • ÇáÍÓÇÓíÉ áÃØÚãÉ ãÚíäÉ  ãÇ ÚáÇãÇÊ åÐå ÇáÍÇáÉ Ãæ ÃÚÑÇÖåÇ¿  ÊÔãá ÃÚÑÇÖ åÐå ÇáÍÇáÉ ãÇ íáí:  • ÇáÓõÚÇá Ãæ ÇáÚØÓ.  • ÇäÓÏÇÏ ÇáÃäÝ (ÇÍÊÞÇä ÈÇáÃäÝ) Ãæ ÍßÉ ÈåÇ¡ Ãæ äÒæá ÅÝÑÇÒÇÊ ãä ÇáÃäÝ.  • ÍßÉ Ýí ÇáÚíäíä æÏãæÚ.  • ÔÚæÑ ÈãÎÇØ íÊÞØÑ ãä ÇáÌÒÁ ÇáÎáÝí ÈÍáÞß (ÇáÊÓÊíá ÇáÎáÝí ÇáÃäÝí).  • ÕÚæÈÉ Ýí Çáäæã.  • ÇáÊÚÈ Ãæ ÇáÅÌåÇÏ.  • ÕÏÇÚ.  • ÇáÊåÇÈ ÇáÍáÞ.  ßíÝ ÊõÔÎÕ åÐå ÇáÍÇáÉ¿  íãßä ÊÔÎíÕ åÐå ÇáÍÇáÉ ÍÓÈ ÇáÃÚÑÇÖ ÇáÊí ÊÚÇäí ãäåÇ æãÑÇÌÚÉ ÇáÊÇÑíÎ ÇáØÈí æÅÌÑÇÁ ÝÍÕ ÈÏäí. æÞÏ íÝÍÕß ãÞÏã ÇáÑÚÇíÉ ÇáÕÍíÉ áÇßÊÔÇÝ ßá ÇáÍÇáÇÊ ÇáÊí áåÇ ÚáÇÞÉ ÈÐáß  ãËá:  • ÇáÑÈæ.  • ÇáÚíä ÇáæÑÏíÉ. æåÐÇ ÇáÊåÇÈ Ýí ÇáÚíä ÈÓÈÈ ÚÏæì (ÇáÊåÇÈ ÇáãáÊÍãÉ).  • ÚÏæì ÇáÃÐä.  • ÚÏæì ÇáÌåÇÒ ÇáÊäÝÓí ÇáÚáæí. æåí ßá ÚÏæì ÊÕíÈ ÇáÃäÝ Ãæ ÇáÍáÞ Ãæ ÇáãÓÇáß ÇáåæÇÆíÉ ÇáÚáíÇ.  æÞÏ ÊõÌÑì áß ÃíÖðÇ ÝÍæÕ áÊÍÏíÏ ãÓÈÈÇÊ ÇáÍÓÇÓíÉ ÇáÊí ÊÓÈÈ ÙåæÑ ÇáÃÚÑÇÖ. ãËá ÝÍæÕ ÌáÏíÉ Ãæ ÊÍÇáíá Ïã.  ßíÝ ÊõÚÇáÌ åÐå ÇáÍÇáÉ¿  áÇ íæÌÏ ÚáÇÌ áåÐå ÇáÍÇáÉ¡ æáßä ÇáÚáÇÌ íÓÇÚÏ Ýí ÇáÊÍßã Ýí ÇáÃÚÑÇÖ. ßÐáß ÑÈãÇ íÔãá ÇáÚáÇÌ:  • ÊäÇæá ÃÏæíÉ ÊãäÚ ÙåæÑ ÃÚÑÇÖ ÇáÍÓÇÓíÉ ãËá ÇáßæÑÊíßæÓÊíÑæíÏÇÊ æãÖÇÏÇÊ ÇáåíÓÊÇãíä. ÞÏ íÌÑí ÅÚØÇÁ ÇáÏæÇÁ Ýí ÕæÑÉ ÍÞäÉ Ãæ ÑÔÇÔ ÃäÝí Ãæ ÃÞÑÇÕ.  • ÊÌäÈ ÌãíÚ ãÓÈÈÇÊ ÇáÍÓÇÓíÉ.  • ÇáÊÚÑÖ ÇáãÊßÑÑ áßãíÇÊ ÕÛíÑÉ ãä ãÓÈÈÇÊ ÇáÍÓÇÓíÉ áãÓÇÚÏÊß Úáì ãÞÇæãÉ ãÓÈÈÇÊ ÇáÍÓÇÓíÉ (ÇáÚáÇÌ ÇáãäÇÚí). æíáÌÃ ãÞÏã ÇáÑÚÇíÉ ÇáÕÍíÉ Åáì åÐÇ Ýí ÇáÍÇáÇÊ ÇáÊí áÇ ÊÝíÏ ÝíåÇ ÇáÚáÇÌÇÊ ÇáÃÎÑì. æÇáÚáÇÌ ÞÏ íÔãá:  ? ÍÞä ÇáÍÓÇÓíÉ. æåÐå ÍÞä ÊÍÊæí Úáì ßãíÇÊ ÕÛíÑÉ ãä ÇáãæÇÏ ÇáãÓÈÈÉ ááÍÓÇÓíÉ.  ? ÇáÚáÇÌ ÇáãäÇÚí ÊÍÊ ÇááÓÇä. æÝíå ÊÃÎÐ ÌÑÚÇÊ ÕÛíÑÉ ãä ÏæÇÁ íÍÊæí Úáì ÇáãÇÏÉ ÇáãõÓÈÈÉ ááÍÓÇÓíÉ ÊÍÊ ÇááÓÇä.  æÅÐÇ áã ÊÍÞÞ åÐå ÇáÚáÇÌÇÊ ÝÇÆÏÉ¡ ÝÞÏ íÕÝ áß ãÞÏã ÇáÑÚÇíÉ ÇáÕÍíÉ ÃÏæíÉ ÃÍÏË æÃßËÑ ÞæÉ.  ÇÊÈÚ åÐå ÇáÊÚáíãÇÊ Ýí ÇáãäÒá:  ÊÌäÈ ãÓÈÈÇÊ ÇáÍÓÇÓíÉ  ÊÚÑøÝ Úáì ÇáãæÇÏ ÇáÊí ÊÚÇäí ãä ÍÓÇÓíÉ ÊÌÇååÇ æÊÌäÈåÇ. ÝíãÇ íáí ÈÚÖ ÇáÃÔíÇÁ ÇáÊí íãßäß ÊäÝíÐåÇ ááãÓÇÚÏÉ Ýí ÊÌäÈ ãÓÈÈÇÊ ÇáÍÓÇÓíÉ:  • ÅÐÇ ßäÊ ÊÚÇäí ãä ÍÓÇÓíÉ ÏÇÆãÉ:  ? ÇÓÊÈÏá ÇáÓÌÇÏ ÈÃÑÖíÉ ãä ÇáÎÔÈ Ãæ ÇáÈáÇØ Ãæ ÇáÝíäíá. ÝÇáÓÌÇÏ íãßäå Ãä íÍÌÒ ÇáæÈÛ æÇáÛÈÇÑ.  ? áÇ ÊõÏÎä. æáÇ ÊÓãÍ ÈÇáÊÏÎíä Ýí ãäÒáß.  ? ÛíøÑ ãÑÔÍÇÊ ÇáÊÏÝÆÉ æÊßííÝ ÇáåæÇÁ ãÑÉ æÇÍÏÉ Ýí ÇáÔåÑ Úáì ÇáÃÞá.  • ÅÐÇ ßäÊ ÊÚÇäí ãä ÍÓÇÓíÉ ãæÓãíÉ¡ ÝÇÊÈÚ ÇáÇÍÊíÇØÇÊ ÇáÊÇáíÉ Ýí ÃËäÇÁ ãæÓã ÇáÍÓÇÓíÉ:  ? ÇÍÑÕ Úáì ÛáÞ ÇáäæÇÝÐ ÞÏÑ ÇáãÓÊØÇÚ.  ? ÎØØ áÞÖÇÁ ÃäÔØÊß ÎÇÑÌ ÇáãäÒá ÚäÏãÇ ÊäÎÝÖ ÃÚÏÇÏ ÇááÞÇÍÇÊ Ýí ÇáåæÇÁ. ÊÍÞÞ ãä ÃÚÏÇÏ ÍÈæÈ ÇááÞÇÍ ÞÈá ÇáÊÎØíØ áÃäÔØÉ Ýí ÃãÇßä ãßÔæÝÉ.  ? ÚäÏ ÇáÚæÏÉ Åáì ÇáãäÒá¡ ÛíÑ ãáÇÈÓß æÇÓÊÍã ÞÈá ÇáÌáæÓ Úáì ÃËÇË ãäÒáß Ãæ Çáäæã.  • ÅÐÇ ßÇä áÏíß ÍíæÇä ÃáíÝ Ýí ÇáãäÒá íäÊÌ Úäå ãÓÈÈÇÊ ááÍÓÇÓíÉ:  ? áÇ ÊÓãÍ ááÍíæÇä ÈÇáÏÎæá Åáì ÛÑÝÉ Çáäæã.  ? æÇÙÈ Úáì ÇáÊäÙíÝ ÈÇÓÊÎÏÇã ÇáãßäÓÉ ÇáßåÑÈÇÆíÉ  æÇáÊÞáíÏíÉ æÅÒÇáÉ ÇáÛÈÇÑ.  ÊÚáíãÇÊ ÚÇãÉ  • ÊäÇæá ÇáÃÏæíÉ æÝÞ ÊÚáíãÇÊ ãÞÏã ÇáÑÚÇíÉ ÇáÕÍíÉ¡ ÃßÇäÊ ÊõÕÑÝ ÈæÕÝÉ ØÈíÉ Ãæ Ïæä æÕÝÉ ØÈíÉ.  • ÇÔÑÈ ßãíÇÊ ßÇÝíÉ ãä ÇáÓæÇÆá áíÙá áæä Èæáßö ÃÕÝÑðÇ ÈÇåÊðÇ.  • ÇáÊÒã ÈÌãíÚ ãæÇÚíÏ ÇáãÊÇÈÚÉ æÝÞ ÊæÌíåÇÊ ãÞÏã ÇáÑÚÇíÉ ÇáÕÍíÉ. æåÐÇ ÃãÑ ãåã.  ãÕÇÏÑ ááÍÕæá Úáì ÇáãÒíÏ ãä ÇáãÚáæãÇÊ  • ÇáÃßÇÏíãíÉ ÇáÃãÑíßíÉ ááÍÓÇÓíÉ æÇáÑÈæ æÚáã ÇáãäÇÚÉ ([employer])þ: [email or web address]  ÇÊÕá ÈãÞÏã ÇáÑÚÇíÉ ÇáÕÍíÉ Ýí ÇáÍÇáÇÊ ÇáÊÇáíÉ:  • ÇáÍãí.  • ÇáÅÕÇÈÉ ÈÓÚÇá áÇ íÒæá.  • ÕÏæÑ ÃÕæÇÊ ÊÔÈå ÇáÕÝíÑ ÚäÏ ÊäÝÓß (ÃÒíÒ).  • ÊÃËíÑ ÇáÃÚÑÇÖ Úáì ããÇÑÓÊß áÃäÔØÉ ÍíÇÊß ÈÔßá ØÈíÚí¡ ãËá Ãä ÊÌÚáß ÊÈØÆ ãäåÇ Ãæ ÊÊæÞÝ ÚäåÇ.  ÇØáÈ ÇáãÓÇÚÏÉ Úáì ÇáÝæÑ Ýí ÇáÍÇáÇÊ ÇáÊÇáíÉ:  • ãÚÇäÇÊß ãä ÖíÞ Ýí ÇáÊäÝÓ.  ÞÏ íãËá ÇáÅÛãÇÁ ãÔßáÉ ÎØíÑÉ ÊÍÊÇÌ áÅÓÚÇÝ ÝæÑðÇ. áÇ ÊäÊÙÑ áÊÑì åá ÓÊÒæá ÇáÃÚÑÇÖ Ãã áÇ. ÇÍÕá Úáì ÇáãÓÇÚÏÉ ÇáØÈíÉ Úáì ÇáÝæÑ. ÇÊÕá ÈÎÏãÇÊ ÇáØæÇÑÆ ÇáãÍáíÉ (911 Ýí ÇáæáÇíÇÊ ÇáãÊÍÏÉ ÇáÃãÑíßíÉ). áÇ ÊÞÏ ÇáÓíÇÑÉ ÈäÝÓß Åáì ÇáãÓÊÔÝì.  ãáÎÕ  • íãßä ÇáÊÛáÈ Úáì ÇáÊåÇÈ ÇáÃäÝ ÇáÃÑÌí ÈÊäÇæá ÇáÃÏæíÉ ÍÓÈ ÇáÊæÌíåÇÊ æÊÌäÈ ãÓÈÈÇÊ ÇáÍÓÇÓíÉ.  • ÅÐÇ ßäÊ ÊÚÇäí ãä ÍÓÇÓíÉ ãæÓãíÉ ÝÇÍÑÕ Úáì ÛáÞ ÇáäæÇÝÐ ÞÏÑ ÇáãÓÊØÇÚ Ýí ÃËäÇÁ ÇáÝÕá ÇáÐí ÊõÕÇÈ Ýíå ÈÇáÍÓÇÓíÉ.  • ÇÊÕá ÈãõÞÏøöã ÇáÑÚÇíÉ ÇáÕÍíÉ ÅÐÇ ÃÕÈÊ ÈÍãì Ãæ ÓÚÇá áÇ íÒæá.  áíÓ ÇáåÏÝ ãä åÐå ÇáãÚáæãÇÊ Ãä Êßæä ÈÏíáÇð ááÅÑÔÇÏÇÊ ÇáÊí íÞÏãåÇ ãæÝÑ ÇáÑÚÇíÉ ÇáÕÍíÉ. ÊÃßÏ ãä ãäÇÞÔÉ ÃíÉ ÃÓÆáÉ ÊÏæÑ Ýí Ðåäß ãÚ ãæÝÑ ÇáÑÚÇíÉ ÇáÕÍíÉ.þ  Document Revised: 01/20/2021 Document Reviewed: 01/20/2021  [employer] Patient Education © 2021 [employer]

## 2021-07-12 ENCOUNTER — OFFICE VISIT (OUTPATIENT)
Dept: INTERNAL MEDICINE | Facility: CLINIC | Age: 51
End: 2021-07-12

## 2021-07-12 VITALS
DIASTOLIC BLOOD PRESSURE: 86 MMHG | WEIGHT: 207 LBS | TEMPERATURE: 96.8 F | BODY MASS INDEX: 32.49 KG/M2 | HEIGHT: 67 IN | SYSTOLIC BLOOD PRESSURE: 132 MMHG | HEART RATE: 90 BPM | OXYGEN SATURATION: 97 %

## 2021-07-12 DIAGNOSIS — E78.2 MIXED HYPERLIPIDEMIA: ICD-10-CM

## 2021-07-12 DIAGNOSIS — R73.03 PREDIABETES: Primary | ICD-10-CM

## 2021-07-12 DIAGNOSIS — I10 ESSENTIAL HYPERTENSION: ICD-10-CM

## 2021-07-12 LAB — HBA1C MFR BLD: 5.7 %

## 2021-07-12 PROCEDURE — 83036 HEMOGLOBIN GLYCOSYLATED A1C: CPT | Performed by: FAMILY MEDICINE

## 2021-07-12 PROCEDURE — 99213 OFFICE O/P EST LOW 20 MIN: CPT | Performed by: FAMILY MEDICINE

## 2021-07-12 NOTE — PROGRESS NOTES
Subjective     Eric Johnson is a 51 y.o. male.     Chief Complaint   Patient presents with   • Hyperlipidemia   • Prediabetes       History of Present Illness     HTN  His BP much improved with diet and medication, eats more healthy diet , no CP/HA       Hyperlipidemia  Last lipid tests were reviewed .  Eating MORE healthy , doing daily exercise   Doing well on statins. No S/E reported    Prediabetics ; he is eating more healthy diet    Last A1c was 6.10, today is 5.7       Lab Results   Component Value Date    HGBA1C 5.7 07/12/2021       Lab Results   Component Value Date    CHOL 190 03/08/2021    TRIG 128 03/08/2021    HDL 47 03/08/2021     (H) 03/08/2021         The following portions of the patient's history were reviewed and updated as appropriate: allergies, current medications, past family history, past medical history, past social history, past surgical history and problem list.        Review of Systems   Respiratory: Negative for shortness of breath, wheezing and stridor.    Cardiovascular: Negative for chest pain, palpitations and leg swelling.   All other systems reviewed and are negative.      Vitals:    07/12/21 0919   BP: 132/86   Pulse: 90   Temp: 96.8 °F (36 °C)   SpO2: 97%           07/12/21 0919   Weight: 93.9 kg (207 lb)         Body mass index is 32.39 kg/m².      Current Outpatient Medications   Medication Sig Dispense Refill   • amLODIPine (NORVASC) 10 MG tablet Take 1 tablet by mouth Daily. 30 tablet 1   • aspirin (aspirin) 81 MG EC tablet Take 1 tablet by mouth Daily. 90 tablet 1   • atorvastatin (Lipitor) 10 MG tablet Take 1 tablet by mouth Daily. 90 tablet 1   • Breo Ellipta 200-25 MCG/INH inhaler      • Dupixent 300 MG/2ML solution prefilled syringe      • fluticasone (FLONASE) 50 MCG/ACT nasal spray 2 sprays into the nostril(s) as directed by provider Daily. 16 g 5   • lisinopril (PRINIVIL,ZESTRIL) 10 MG tablet Take 1 tablet by mouth Daily. 30 tablet 3   • loratadine  (Claritin) 10 MG tablet Take 1 tablet by mouth Daily. 90 tablet 1   • montelukast (SINGULAIR) 10 MG tablet Take 1 tablet by mouth Every Night. 90 tablet 1   • nystatin-triamcinolone (MYCOLOG) 244504-3.1 UNIT/GM-% ointment Apply  topically to the appropriate area as directed 2 (Two) Times a Day. 60 g 1   • pantoprazole (PROTONIX) 40 MG EC tablet Take 1 tablet by mouth Every Morning Before Breakfast. 30 tablet 2   • Ventolin  (90 Base) MCG/ACT inhaler        No current facility-administered medications for this visit.                Objective   Physical Exam  Vitals and nursing note reviewed.   Constitutional:       Appearance: He is well-developed. He is not ill-appearing or diaphoretic.   HENT:      Head: Normocephalic.      Mouth/Throat:      Mouth: Mucous membranes are moist.      Pharynx: Oropharynx is clear.   Eyes:      Conjunctiva/sclera: Conjunctivae normal.   Neck:      Thyroid: No thyromegaly.   Cardiovascular:      Rate and Rhythm: Normal rate and regular rhythm.      Heart sounds: Normal heart sounds. No murmur heard.   No friction rub. No gallop.    Pulmonary:      Effort: Pulmonary effort is normal. No respiratory distress.      Breath sounds: Normal breath sounds. No stridor. No wheezing, rhonchi or rales.   Abdominal:      General: Bowel sounds are normal. There is no distension.      Palpations: Abdomen is soft. There is no mass.      Tenderness: There is no abdominal tenderness. There is no guarding or rebound.      Hernia: No hernia is present.   Musculoskeletal:         General: Normal range of motion.      Cervical back: Neck supple.   Skin:     General: Skin is warm.      Findings: No rash.   Neurological:      General: No focal deficit present.      Mental Status: He is alert and oriented to person, place, and time.      Motor: No abnormal muscle tone.   Psychiatric:         Mood and Affect: Mood normal.         Behavior: Behavior normal.         Thought Content: Thought content normal.          Judgment: Judgment normal.           Assessment/Plan   Diagnoses and all orders for this visit:    1. Prediabetes (Primary)  -     POC Glycosylated Hemoglobin (Hb A1C)  - A1c much improved , continue with H. diet     2. Mixed hyperlipidemia  -     Lipid Panel; Future  - Stable, continue Rx    3. Essential hypertension  - Well controlled , will continue current Rx  - Close monitoring and f/u   - Discussed in length about lifestyle modification , wt loss, eating healthy , daily exercise     I have fully discussed the nature of the medical condition(s) risks, complications, management, safe and proper use of medications.   I have discussed the SIDE EFFECT OF MEDICATION and importance TO report any side effect , the patient expressed good understanding.  Encouraged medication compliance and the importance of keeping scheduled follow up appointments with me and any other providers.    Patient instructed to follow up with our office for results on any labs/imaging ordered during this visit.    Home care discussed  All questions answered  Patient verbalizes understanding and agrees to treatment plan.     Follow up: Return in about 3 months (around 10/12/2021) for follow up on current illness.

## 2021-07-12 NOTE — PATIENT INSTRUCTIONS

## 2021-08-02 ENCOUNTER — TELEPHONE (OUTPATIENT)
Dept: INTERNAL MEDICINE | Facility: CLINIC | Age: 51
End: 2021-08-02

## 2021-08-02 DIAGNOSIS — E78.2 MIXED HYPERLIPIDEMIA: ICD-10-CM

## 2021-08-02 DIAGNOSIS — I10 ESSENTIAL HYPERTENSION: ICD-10-CM

## 2021-08-02 DIAGNOSIS — J30.2 SEASONAL ALLERGIES: ICD-10-CM

## 2021-08-02 DIAGNOSIS — Z29.9 PREVENTIVE MEASURE: ICD-10-CM

## 2021-08-02 DIAGNOSIS — K21.9 CHRONIC GERD: ICD-10-CM

## 2021-08-02 NOTE — TELEPHONE ENCOUNTER
Caller: Camelba Eric    Relationship: Self    Best call back number: [phone]    Medication needed:   Requested Prescriptions      No prescriptions requested or ordered in this encounter   BLOOD PRESSURE MEDICATION   AND   STOMACH MEDICATION FOR HEARTBURN   HE CANNOT REMEMBER THE NAME OF BOTH OF THESE     When do you need the refill by: TODAY     What additional details did the patient provide when requesting the medication:   PATIENT STATES HE HAS MOVED TO  ANOTHER HOUSE AND HAS LOST HIS MEDICATIONS    ALSO PATIENT IS GOING OVERSEAS AND HE WANTS REFILLS ON  ANY OTHER MEDICATION HE WILL BE OUT OF      Does the patient have less than a 3 day supply:  [x] Yes  [] No    What is the patient's preferred pharmacy: [facility] [address] [place] [address] [address] AT [facility] [phone] [facility] [phone] FX

## 2021-08-03 RX ORDER — LORATADINE 10 MG/1
10 TABLET ORAL DAILY
Qty: 90 TABLET | Refills: 1 | Status: SHIPPED | OUTPATIENT
Start: 2021-08-03 | End: 2022-05-16

## 2021-08-03 RX ORDER — FLUTICASONE PROPIONATE 50 MCG
2 SPRAY, SUSPENSION (ML) NASAL DAILY
Qty: 16 G | Refills: 5 | Status: SHIPPED | OUTPATIENT
Start: 2021-08-03 | End: 2022-05-16

## 2021-08-03 RX ORDER — ATORVASTATIN CALCIUM 10 MG/1
10 TABLET, FILM COATED ORAL DAILY
Qty: 90 TABLET | Refills: 1 | Status: SHIPPED | OUTPATIENT
Start: 2021-08-03 | End: 2022-02-07 | Stop reason: SDUPTHER

## 2021-08-03 RX ORDER — MONTELUKAST SODIUM 10 MG/1
10 TABLET ORAL NIGHTLY
Qty: 90 TABLET | Refills: 1 | Status: SHIPPED | OUTPATIENT
Start: 2021-08-03 | End: 2022-05-16

## 2021-08-03 RX ORDER — ASPIRIN 81 MG/1
81 TABLET ORAL DAILY
Qty: 90 TABLET | Refills: 1 | Status: SHIPPED | OUTPATIENT
Start: 2021-08-03 | End: 2022-05-16 | Stop reason: SDUPTHER

## 2021-08-03 RX ORDER — AMLODIPINE BESYLATE 10 MG/1
10 TABLET ORAL DAILY
Qty: 90 TABLET | Refills: 1 | Status: SHIPPED | OUTPATIENT
Start: 2021-08-03 | End: 2022-01-06 | Stop reason: SDUPTHER

## 2021-08-03 RX ORDER — LISINOPRIL 10 MG/1
10 TABLET ORAL DAILY
Qty: 90 TABLET | Refills: 1 | Status: SHIPPED | OUTPATIENT
Start: 2021-08-03 | End: 2022-01-06 | Stop reason: SDUPTHER

## 2021-08-03 RX ORDER — PANTOPRAZOLE SODIUM 40 MG/1
40 TABLET, DELAYED RELEASE ORAL
Qty: 90 TABLET | Refills: 1 | Status: SHIPPED | OUTPATIENT
Start: 2021-08-03 | End: 2022-01-06 | Stop reason: SDUPTHER

## 2021-10-13 ENCOUNTER — OFFICE VISIT (OUTPATIENT)
Dept: INTERNAL MEDICINE | Facility: CLINIC | Age: 51
End: 2021-10-13

## 2021-10-13 VITALS
TEMPERATURE: 96.8 F | HEIGHT: 67 IN | DIASTOLIC BLOOD PRESSURE: 82 MMHG | OXYGEN SATURATION: 97 % | SYSTOLIC BLOOD PRESSURE: 131 MMHG | HEART RATE: 82 BPM | WEIGHT: 200 LBS | BODY MASS INDEX: 31.39 KG/M2

## 2021-10-13 DIAGNOSIS — E78.2 MIXED HYPERLIPIDEMIA: Primary | ICD-10-CM

## 2021-10-13 DIAGNOSIS — I10 PRIMARY HYPERTENSION: ICD-10-CM

## 2021-10-13 PROCEDURE — 99213 OFFICE O/P EST LOW 20 MIN: CPT | Performed by: FAMILY MEDICINE

## 2021-10-13 NOTE — PROGRESS NOTES
Subjective     Eric Johnson is a 51 y.o. male.     Chief Complaint   Patient presents with   • Hyperlipidemia       History of Present Illness       HTN  -follow-up of hypertension  -home blood pressure readings:  BP check at home wnl  -salt intake : low sodium diet   -associated signs and symptoms: none  -BP elevated , rechecked better.   -No S/E reported from current Rx   -medication compliance: taking as prescribed  -Denies  blurred vision, chest pain, neck pain, orthopnea or shortness of breath.       Hyperlipidemia  This is a chronic problem.   Last lipid tests were reviewed . Time for recheck   Eating lately non healthy as he was visiting his parent and stayed there for 2 months.   Pertinent negatives include no chest pain or shortness of breath.   Current antihyperlipidemic treatment includes statins.    Risk factors for coronary artery disease include dyslipidemia.   Lab Results   Component Value Date    CHOL 190 03/08/2021    TRIG 128 03/08/2021    HDL 47 03/08/2021     (H) 03/08/2021         The following portions of the patient's history were reviewed and updated as appropriate: allergies, current medications, past family history, past medical history, past social history, past surgical history and problem list.        Review of Systems   Cardiovascular: Negative for chest pain, palpitations and leg swelling.       Wt Readings from Last 3 Encounters:   10/13/21 90.7 kg (200 lb)   07/12/21 93.9 kg (207 lb)   06/07/21 93.9 kg (207 lb)     Temp Readings from Last 3 Encounters:   10/13/21 96.8 °F (36 °C) (Temporal)   07/12/21 96.8 °F (36 °C) (Temporal)   06/07/21 97.5 °F (36.4 °C) (Temporal)     BP Readings from Last 3 Encounters:   10/13/21 131/82   07/12/21 132/86   06/07/21 138/88     Pulse Readings from Last 3 Encounters:   10/13/21 82   07/12/21 90   06/07/21 90         Vitals:    10/13/21 0937   BP: 131/82   Pulse: 82   Temp:    SpO2:            10/13/21  0920   Weight: 90.7 kg (200 lb)          Body mass index is 31.28 kg/m².      Current Outpatient Medications   Medication Sig Dispense Refill   • amLODIPine (NORVASC) 10 MG tablet Take 1 tablet by mouth Daily. 90 tablet 1   • aspirin (aspirin) 81 MG EC tablet Take 1 tablet by mouth Daily. 90 tablet 1   • atorvastatin (Lipitor) 10 MG tablet Take 1 tablet by mouth Daily. 90 tablet 1   • Breo Ellipta 200-25 MCG/INH inhaler      • Dupixent 300 MG/2ML solution prefilled syringe      • fluticasone (FLONASE) 50 MCG/ACT nasal spray 2 sprays into the nostril(s) as directed by provider Daily. 16 g 5   • lisinopril (PRINIVIL,ZESTRIL) 10 MG tablet Take 1 tablet by mouth Daily. 90 tablet 1   • loratadine (Claritin) 10 MG tablet Take 1 tablet by mouth Daily. 90 tablet 1   • montelukast (SINGULAIR) 10 MG tablet Take 1 tablet by mouth Every Night. 90 tablet 1   • nystatin-triamcinolone (MYCOLOG) 017498-2.1 UNIT/GM-% ointment Apply  topically to the appropriate area as directed 2 (Two) Times a Day. 60 g 1   • pantoprazole (PROTONIX) 40 MG EC tablet Take 1 tablet by mouth Every Morning Before Breakfast. 90 tablet 1   • Ventolin  (90 Base) MCG/ACT inhaler        No current facility-administered medications for this visit.                Objective   Physical Exam  Vitals and nursing note reviewed.   Constitutional:       Appearance: He is well-developed. He is not ill-appearing or diaphoretic.   HENT:      Head: Normocephalic.      Mouth/Throat:      Mouth: Mucous membranes are moist.      Pharynx: Oropharynx is clear.   Neck:      Thyroid: No thyromegaly.   Cardiovascular:      Rate and Rhythm: Normal rate and regular rhythm.      Heart sounds: Normal heart sounds. No murmur heard.  No friction rub. No gallop.    Pulmonary:      Effort: Pulmonary effort is normal. No respiratory distress.      Breath sounds: Normal breath sounds. No stridor. No wheezing, rhonchi or rales.   Musculoskeletal:      Cervical back: Neck supple.      Right lower leg: No edema.       Left lower leg: No edema.   Skin:     General: Skin is warm.      Coloration: Skin is not pale.      Findings: No erythema or rash.   Neurological:      Mental Status: He is alert and oriented to person, place, and time.      Motor: No abnormal muscle tone.   Psychiatric:         Mood and Affect: Mood normal.         Behavior: Behavior normal.         Thought Content: Thought content normal.         Judgment: Judgment normal.           Assessment/Plan   Diagnoses and all orders for this visit:    1. Mixed hyperlipidemia (Primary)  - Stable, continue current Rx  -      Lipid Panel; Future  Encouraged patient to maintain a low cholesterol/DASH diet, increase aerobic exercise as tolerated, decrease alcohol, stop smoking if applicable, increase fiber intake, limit sodium intake to no more than 1,500mg/day, increase omega-3 fatty acids, and maintain medication compliance.     2. Primary hypertension  - Stable, continue current Rx  Encouraged patient to maintain a heart healthy diet/DASH diet, exercise as tolerated, limit sodium intake to no more than 1,500mg/day, limit alcohol intake, maintain medication compliance and practice relaxation techniques to reduce stress.       I have fully discussed the nature of the medical condition(s) risks, complications, management, safe and proper use of medications.   I have discussed the SIDE EFFECT OF MEDICATION and importance TO report any side effect , the patient expressed good understanding.  Encouraged medication compliance and the importance of keeping scheduled follow up appointments with me and any other providers.    Patient instructed to follow up with our office for results on any labs/imaging ordered during this visit.    Home care discussed  All questions answered  Patient verbalizes understanding and agrees to treatment plan.     Follow up: Return in about 3 months (around 1/13/2022) for follow up on current illness.

## 2021-10-13 NOTE — PATIENT INSTRUCTIONS
"High Cholesterol    High cholesterol is a condition in which the blood has high levels of a white, waxy substance similar to fat (cholesterol). The liver makes all the cholesterol that the body needs. The human body needs small amounts of cholesterol to help build cells. A person gets extra or excess cholesterol from the food that he or she eats.  The blood carries cholesterol from the liver to the rest of the body. If you have high cholesterol, deposits (plaques) may build up on the walls of your arteries. Arteries are the blood vessels that carry blood away from your heart. These plaques make the arteries narrow and stiff.  Cholesterol plaques increase your risk for heart attack and stroke. Work with your health care provider to keep your cholesterol levels in a healthy range.  What increases the risk?  The following factors may make you more likely to develop this condition:  · Eating foods that are high in animal fat (saturated fat) or cholesterol.  · Being overweight.  · Not getting enough exercise.  · A family history of high cholesterol (familial hypercholesterolemia).  · Use of tobacco products.  · Having diabetes.  What are the signs or symptoms?  There are no symptoms of this condition.  How is this diagnosed?  This condition may be diagnosed based on the results of a blood test.  · If you are older than 20 years of age, your health care provider may check your cholesterol levels every 4-6 years.  · You may be checked more often if you have high cholesterol or other risk factors for heart disease.  The blood test for cholesterol measures:  · \"Bad\" cholesterol, or LDL cholesterol. This is the main type of cholesterol that causes heart disease. The desired level is less than 100 mg/dL.  · \"Good\" cholesterol, or HDL cholesterol. HDL helps protect against heart disease by cleaning the arteries and carrying the LDL to the liver for processing. The desired level for HDL is 60 mg/dL or higher.  · Triglycerides. " These are fats that your body can store or burn for energy. The desired level is less than 150 mg/dL.  · Total cholesterol. This measures the total amount of cholesterol in your blood and includes LDL, HDL, and triglycerides. The desired level is less than 200 mg/dL.  How is this treated?  This condition may be treated with:  · Diet changes. You may be asked to eat foods that have more fiber and less saturated fats or added sugar.  · Lifestyle changes. These may include regular exercise, maintaining a healthy weight, and quitting use of tobacco products.  · Medicines. These are given when diet and lifestyle changes have not worked. You may be prescribed a statin medicine to help lower your cholesterol levels.  Follow these instructions at home:  Eating and drinking    · Eat a healthy, balanced diet. This diet includes:  ? Daily servings of a variety of fresh, frozen, or canned fruits and vegetables.  ? Daily servings of whole grain foods that are rich in fiber.  ? Foods that are low in saturated fats and trans fats. These include poultry and fish without skin, lean cuts of meat, and low-fat dairy products.  ? A variety of fish, especially oily fish that contain omega-3 fatty acids. Aim to eat fish at least 2 times a week.  · Avoid foods and drinks that have added sugar.  · Use healthy cooking methods, such as roasting, grilling, broiling, baking, poaching, steaming, and stir-frying. Do not stephenson your food except for stir-frying.    Lifestyle    · Get regular exercise. Aim to exercise for a total of 150 minutes a week. Increase your activity level by doing activities such as gardening, walking, and taking the stairs.  · Do not use any products that contain nicotine or tobacco, such as cigarettes, e-cigarettes, and chewing tobacco. If you need help quitting, ask your health care provider.    General instructions  · Take over-the-counter and prescription medicines only as told by your health care provider.  · Keep all  "follow-up visits as told by your health care provider. This is important.  Where to find more information  · American Heart Association: www.heart.org  · National Heart, Lung, and Blood [place]: www.nhlbi.nih.gov  Contact a health care provider if:  · You have trouble achieving or maintaining a healthy diet or weight.  · You are starting an exercise program.  · You are unable to stop smoking.  Get help right away if:  · You have chest pain.  · You have trouble breathing.  · You have any symptoms of a stroke. \"BE FAST\" is an easy way to remember the main warning signs of a stroke:  ? B - Balance. Signs are dizziness, sudden trouble walking, or loss of balance.  ? E - Eyes. Signs are trouble seeing or a sudden change in vision.  ? F - Face. Signs are sudden weakness or numbness of the face, or the face or eyelid drooping on one side.  ? A - Arms. Signs are weakness or numbness in an arm. This happens suddenly and usually on one side of the body.  ? S - Speech. Signs are sudden trouble speaking, slurred speech, or trouble understanding what people say.  ? T - Time. Time to call emergency services. Write down what time symptoms started.  · You have other signs of a stroke, such as:  ? A sudden, severe headache with no known cause.  ? Nausea or vomiting.  ? Seizure.  These symptoms may represent a serious problem that is an emergency. Do not wait to see if the symptoms will go away. Get medical help right away. Call your local emergency services (911 in the [place] Do not drive yourself to the hospital.  Summary  · Cholesterol plaques increase your risk for heart attack and stroke. Work with your health care provider to keep your cholesterol levels in a healthy range.  · Eat a healthy, balanced diet, get regular exercise, and maintain a healthy weight.  · Do not use any products that contain nicotine or tobacco, such as cigarettes, e-cigarettes, and chewing tobacco.  · Get help right away if you have any symptoms of a " stroke.  This information is not intended to replace advice given to you by your health care provider. Make sure you discuss any questions you have with your health care provider.  Document Revised: 11/16/2020 Document Reviewed: 11/16/2020  Elsevier Patient Education © 2021 [employer]

## 2021-10-18 ENCOUNTER — LAB (OUTPATIENT)
Dept: LAB | Facility: HOSPITAL | Age: 51
End: 2021-10-18

## 2021-10-18 DIAGNOSIS — E78.2 MIXED HYPERLIPIDEMIA: ICD-10-CM

## 2021-10-18 DIAGNOSIS — R73.03 PREDIABETES: ICD-10-CM

## 2021-10-18 PROCEDURE — 80061 LIPID PANEL: CPT | Performed by: FAMILY MEDICINE

## 2021-10-18 PROCEDURE — 83036 HEMOGLOBIN GLYCOSYLATED A1C: CPT | Performed by: FAMILY MEDICINE

## 2021-10-19 LAB
CHOLEST SERPL-MCNC: 148 MG/DL (ref 0–200)
HBA1C MFR BLD: 5.8 % (ref 4.8–5.6)
HDLC SERPL-MCNC: 43 MG/DL (ref 40–60)
LDLC SERPL CALC-MCNC: 86 MG/DL (ref 0–100)
LDLC/HDLC SERPL: 1.96 {RATIO}
TRIGL SERPL-MCNC: 103 MG/DL (ref 0–150)
VLDLC SERPL-MCNC: 19 MG/DL (ref 5–40)

## 2022-01-06 ENCOUNTER — OFFICE VISIT (OUTPATIENT)
Dept: INTERNAL MEDICINE | Facility: CLINIC | Age: 52
End: 2022-01-06

## 2022-01-06 VITALS
WEIGHT: 215.4 LBS | HEART RATE: 98 BPM | BODY MASS INDEX: 33.81 KG/M2 | OXYGEN SATURATION: 95 % | HEIGHT: 67 IN | TEMPERATURE: 96.8 F | DIASTOLIC BLOOD PRESSURE: 98 MMHG | SYSTOLIC BLOOD PRESSURE: 148 MMHG

## 2022-01-06 DIAGNOSIS — K21.9 CHRONIC GERD: ICD-10-CM

## 2022-01-06 DIAGNOSIS — E78.2 MIXED HYPERLIPIDEMIA: Primary | ICD-10-CM

## 2022-01-06 DIAGNOSIS — I10 ESSENTIAL HYPERTENSION: ICD-10-CM

## 2022-01-06 PROCEDURE — 99214 OFFICE O/P EST MOD 30 MIN: CPT | Performed by: FAMILY MEDICINE

## 2022-01-06 RX ORDER — AZELASTINE HCL 205.5 UG/1
SPRAY NASAL
COMMUNITY
Start: 2021-12-10 | End: 2022-05-16

## 2022-01-06 RX ORDER — LISINOPRIL 10 MG/1
10 TABLET ORAL DAILY
Qty: 90 TABLET | Refills: 1 | Status: SHIPPED | OUTPATIENT
Start: 2022-01-06 | End: 2022-05-16 | Stop reason: SDUPTHER

## 2022-01-06 RX ORDER — AMLODIPINE BESYLATE 10 MG/1
10 TABLET ORAL DAILY
Qty: 90 TABLET | Refills: 1 | Status: SHIPPED | OUTPATIENT
Start: 2022-01-06 | End: 2022-05-16 | Stop reason: SDUPTHER

## 2022-01-06 RX ORDER — PANTOPRAZOLE SODIUM 40 MG/1
40 TABLET, DELAYED RELEASE ORAL
Qty: 90 TABLET | Refills: 1 | Status: SHIPPED | OUTPATIENT
Start: 2022-01-06 | End: 2022-11-28 | Stop reason: SDUPTHER

## 2022-01-06 NOTE — PATIENT INSTRUCTIONS
"High Cholesterol    High cholesterol is a condition in which the blood has high levels of a white, waxy substance similar to fat (cholesterol). The liver makes all the cholesterol that the body needs. The human body needs small amounts of cholesterol to help build cells. A person gets extra or excess cholesterol from the food that he or she eats.  The blood carries cholesterol from the liver to the rest of the body. If you have high cholesterol, deposits (plaques) may build up on the walls of your arteries. Arteries are the blood vessels that carry blood away from your heart. These plaques make the arteries narrow and stiff.  Cholesterol plaques increase your risk for heart attack and stroke. Work with your health care provider to keep your cholesterol levels in a healthy range.  What increases the risk?  The following factors may make you more likely to develop this condition:  · Eating foods that are high in animal fat (saturated fat) or cholesterol.  · Being overweight.  · Not getting enough exercise.  · A family history of high cholesterol (familial hypercholesterolemia).  · Use of tobacco products.  · Having diabetes.  What are the signs or symptoms?  There are no symptoms of this condition.  How is this diagnosed?  This condition may be diagnosed based on the results of a blood test.  · If you are older than 20 years of age, your health care provider may check your cholesterol levels every 4-6 years.  · You may be checked more often if you have high cholesterol or other risk factors for heart disease.  The blood test for cholesterol measures:  · \"Bad\" cholesterol, or LDL cholesterol. This is the main type of cholesterol that causes heart disease. The desired level is less than 100 mg/dL.  · \"Good\" cholesterol, or HDL cholesterol. HDL helps protect against heart disease by cleaning the arteries and carrying the LDL to the liver for processing. The desired level for HDL is 60 mg/dL or higher.  · Triglycerides. " These are fats that your body can store or burn for energy. The desired level is less than 150 mg/dL.  · Total cholesterol. This measures the total amount of cholesterol in your blood and includes LDL, HDL, and triglycerides. The desired level is less than 200 mg/dL.  How is this treated?  This condition may be treated with:  · Diet changes. You may be asked to eat foods that have more fiber and less saturated fats or added sugar.  · Lifestyle changes. These may include regular exercise, maintaining a healthy weight, and quitting use of tobacco products.  · Medicines. These are given when diet and lifestyle changes have not worked. You may be prescribed a statin medicine to help lower your cholesterol levels.  Follow these instructions at home:  Eating and drinking    · Eat a healthy, balanced diet. This diet includes:  ? Daily servings of a variety of fresh, frozen, or canned fruits and vegetables.  ? Daily servings of whole grain foods that are rich in fiber.  ? Foods that are low in saturated fats and trans fats. These include poultry and fish without skin, lean cuts of meat, and low-fat dairy products.  ? A variety of fish, especially oily fish that contain omega-3 fatty acids. Aim to eat fish at least 2 times a week.  · Avoid foods and drinks that have added sugar.  · Use healthy cooking methods, such as roasting, grilling, broiling, baking, poaching, steaming, and stir-frying. Do not stephenson your food except for stir-frying.    Lifestyle    · Get regular exercise. Aim to exercise for a total of 150 minutes a week. Increase your activity level by doing activities such as gardening, walking, and taking the stairs.  · Do not use any products that contain nicotine or tobacco, such as cigarettes, e-cigarettes, and chewing tobacco. If you need help quitting, ask your health care provider.    General instructions  · Take over-the-counter and prescription medicines only as told by your health care provider.  · Keep all  "follow-up visits as told by your health care provider. This is important.  Where to find more information  · American Heart Association: www.heart.org  · National Heart, Lung, and Blood [place]: www.nhlbi.nih.gov  Contact a health care provider if:  · You have trouble achieving or maintaining a healthy diet or weight.  · You are starting an exercise program.  · You are unable to stop smoking.  Get help right away if:  · You have chest pain.  · You have trouble breathing.  · You have any symptoms of a stroke. \"BE FAST\" is an easy way to remember the main warning signs of a stroke:  ? B - Balance. Signs are dizziness, sudden trouble walking, or loss of balance.  ? E - Eyes. Signs are trouble seeing or a sudden change in vision.  ? F - Face. Signs are sudden weakness or numbness of the face, or the face or eyelid drooping on one side.  ? A - Arms. Signs are weakness or numbness in an arm. This happens suddenly and usually on one side of the body.  ? S - Speech. Signs are sudden trouble speaking, slurred speech, or trouble understanding what people say.  ? T - Time. Time to call emergency services. Write down what time symptoms started.  · You have other signs of a stroke, such as:  ? A sudden, severe headache with no known cause.  ? Nausea or vomiting.  ? Seizure.  These symptoms may represent a serious problem that is an emergency. Do not wait to see if the symptoms will go away. Get medical help right away. Call your local emergency services (911 in the [place] Do not drive yourself to the hospital.  Summary  · Cholesterol plaques increase your risk for heart attack and stroke. Work with your health care provider to keep your cholesterol levels in a healthy range.  · Eat a healthy, balanced diet, get regular exercise, and maintain a healthy weight.  · Do not use any products that contain nicotine or tobacco, such as cigarettes, e-cigarettes, and chewing tobacco.  · Get help right away if you have any symptoms of a " stroke.  This information is not intended to replace advice given to you by your health care provider. Make sure you discuss any questions you have with your health care provider.  Document Revised: 11/16/2020 Document Reviewed: 11/16/2020  Elsevier Patient Education © 2021 [employer]vier Inc.       place/person

## 2022-01-06 NOTE — PROGRESS NOTES
Subjective     Eric Johnson is a 51 y.o. male.     Chief Complaint   Patient presents with   • Hypertension     may need refills    • Hyperlipidemia       History of Present Illness     HTN  -follow-up of hypertension  -home blood pressure readings:  BP check at home AT [facility] . Today elevated as he did not take the medication yet   -salt intake : on low sodium diet   -associated signs and symptoms: none  -medication compliance: taking as prescribed, No S/E reported from current Rx   -Denies  blurred vision, chest pain, neck pain, orthopnea or shortness of breath.       GERD; sx well controlled with PPI . Has normal color BM       Hyperlipidemia  This is a chronic problem.   Last lipid tests were reviewed .  Eating healthy , doing daily exercise   Pertinent negatives include no chest pain or shortness of breath.   Current antihyperlipidemic treatment includes statins.    Risk factors for coronary artery disease include dyslipidemia.   Lab Results   Component Value Date    CHOL 148 10/18/2021    TRIG 103 10/18/2021    HDL 43 10/18/2021    LDL 86 10/18/2021       C/o Rt shoulder pain for 2 weeks , pain on/off, 3-5/10, no h/o fall or trauma , no weakness       The following portions of the patient's history were reviewed and updated as appropriate: allergies, current medications, past family history, past medical history, past social history, past surgical history and problem list.        Review of Systems   Cardiovascular: Negative for chest pain, palpitations and leg swelling.   Musculoskeletal: Positive for arthralgias.   Neurological: Negative for dizziness, headache and confusion.       Vitals:    01/06/22 1046   BP: 148/98   Pulse: 98   Temp: 96.8 °F (36 °C)   SpO2: 95%           01/06/22  1046   Weight: 97.7 kg (215 lb 6.4 oz)         Body mass index is 33.69 kg/m².      Current Outpatient Medications   Medication Sig Dispense Refill   • amLODIPine (NORVASC) 10 MG tablet Take 1 tablet by mouth Daily.  90 tablet 1   • fluticasone (FLONASE) 50 MCG/ACT nasal spray 2 sprays into the nostril(s) as directed by provider Daily. 16 g 5   • lisinopril (PRINIVIL,ZESTRIL) 10 MG tablet Take 1 tablet by mouth Daily. 90 tablet 1   • nystatin-triamcinolone (MYCOLOG) 231102-8.1 UNIT/GM-% ointment Apply  topically to the appropriate area as directed 2 (Two) Times a Day. 60 g 1   • pantoprazole (PROTONIX) 40 MG EC tablet Take 1 tablet by mouth Every Morning Before Breakfast. 90 tablet 1   • aspirin (aspirin) 81 MG EC tablet Take 1 tablet by mouth Daily. 90 tablet 1   • atorvastatin (Lipitor) 10 MG tablet Take 1 tablet by mouth Daily. 90 tablet 1   • azelastine (ASTEPRO) 0.15 % solution nasal spray      • Breo Ellipta 200-25 MCG/INH inhaler      • Dupixent 300 MG/2ML solution prefilled syringe      • loratadine (Claritin) 10 MG tablet Take 1 tablet by mouth Daily. 90 tablet 1   • montelukast (SINGULAIR) 10 MG tablet Take 1 tablet by mouth Every Night. 90 tablet 1   • Ventolin  (90 Base) MCG/ACT inhaler        No current facility-administered medications for this visit.                Objective   Physical Exam  Vitals and nursing note reviewed.   Constitutional:       General: He is not in acute distress.     Appearance: He is not ill-appearing, toxic-appearing or diaphoretic.   HENT:      Mouth/Throat:      Mouth: Mucous membranes are moist.   Cardiovascular:      Rate and Rhythm: Normal rate and regular rhythm.      Heart sounds: Normal heart sounds. No murmur heard.      Pulmonary:      Effort: Pulmonary effort is normal. No respiratory distress.      Breath sounds: Normal breath sounds. No stridor. No wheezing or rhonchi.   Abdominal:      General: Bowel sounds are normal. There is no distension.      Palpations: Abdomen is soft. There is no mass.      Tenderness: There is no abdominal tenderness. There is no guarding or rebound.      Hernia: No hernia is present.   Musculoskeletal:         General: No swelling, tenderness  or deformity. Normal range of motion.   Skin:     General: Skin is warm.      Findings: No lesion.   Neurological:      Mental Status: He is alert and oriented to person, place, and time.   Psychiatric:         Mood and Affect: Mood normal.         Behavior: Behavior normal.         Thought Content: Thought content normal.           Assessment/Plan   Diagnoses and all orders for this visit:    1. Mixed hyperlipidemia (Primary)  - Stable, continue current Rx  Encouraged patient to maintain a low cholesterol/DASH diet, increase aerobic exercise as tolerated, decrease alcohol, stop smoking if applicable, increase fiber intake, limit sodium intake to no more than 1,500mg/day, increase omega-3 fatty acids, and maintain medication compliance.     2. Chronic GERD  - Stable, continue current Rx  -     pantoprazole (PROTONIX) 40 MG EC tablet; Take 1 tablet by mouth Every Morning Before Breakfast.  Dispense: 90 tablet; Refill: 1  Recommendations discussed with patient for decreasing CHARLEEN symptoms: eliminate foods that may trigger symptoms, avoid alcohol at bedtime, and avoid lying down after 3 hours of eating.  Common irritating foods include: chocolate, garlic, onions, citrus fruits, coffee, alcohol, highly seasoned foods and carbonated beverages.       3. Essential hypertension  - Stable, continue current Rx  -     amLODIPine (NORVASC) 10 MG tablet; Take 1 tablet by mouth Daily.  Dispense: 90 tablet; Refill: 1  -     lisinopril (PRINIVIL,ZESTRIL) 10 MG tablet; Take 1 tablet by mouth Daily.  Dispense: 90 tablet; Refill: 1  Encouraged patient to maintain a heart healthy diet/DASH diet, exercise as tolerated, limit sodium intake to no more than 1,500mg/day, limit alcohol intake, maintain medication compliance and practice relaxation techniques to reduce stress.          I was wearing N95 mask and eye protection during the entire duration of the visit.   Patient was masked the whole entire time.   Minimum social distance of 6 ft  maintained through entire visit except for physical exam as documented.          I have fully discussed the nature of the medical condition(s) risks, complications, management, safe and proper use of medications.   I have discussed the SIDE EFFECT OF MEDICATION and importance TO report any side effect , the patient expressed good understanding.  Encouraged medication compliance and the importance of keeping scheduled follow up appointments with me and any other providers.    Patient instructed to follow up with our office for results on any labs/imaging ordered during this visit.    Home care discussed  All questions answered  Patient verbalizes understanding and agrees to treatment plan.     Follow up: Return in about 3 months (around 4/6/2022) for follow up on current illness.

## 2022-02-07 DIAGNOSIS — E78.2 MIXED HYPERLIPIDEMIA: ICD-10-CM

## 2022-02-07 RX ORDER — ATORVASTATIN CALCIUM 10 MG/1
10 TABLET, FILM COATED ORAL DAILY
Qty: 90 TABLET | Refills: 1 | Status: SHIPPED | OUTPATIENT
Start: 2022-02-07 | End: 2022-05-16 | Stop reason: SDUPTHER

## 2022-04-01 ENCOUNTER — TELEPHONE (OUTPATIENT)
Dept: INTERNAL MEDICINE | Facility: CLINIC | Age: 52
End: 2022-04-01

## 2022-04-01 NOTE — TELEPHONE ENCOUNTER
Caller: Eric Johnson    Relationship: Self    Best call back number: [phone]    Requested Prescriptions:   MEDICINE FOR STOMACH - CAN'T REMEMBER THE [facility]-  LIKE [facility]     Pharmacy where request should be sent: [address] AT [facility] [phone] [facility] [phone] FX     Additional details provided by patient: PLEASE REFILL  Does the patient have less than a 3 day supply:  [x] Yes  [] No    Katelyn Castanon Rep   04/01/22 12:23 EDT

## 2022-04-06 ENCOUNTER — LAB (OUTPATIENT)
Dept: LAB | Facility: HOSPITAL | Age: 52
End: 2022-04-06

## 2022-04-06 ENCOUNTER — OFFICE VISIT (OUTPATIENT)
Dept: INTERNAL MEDICINE | Facility: CLINIC | Age: 52
End: 2022-04-06

## 2022-04-06 VITALS
TEMPERATURE: 96.8 F | DIASTOLIC BLOOD PRESSURE: 98 MMHG | HEIGHT: 67 IN | BODY MASS INDEX: 33.62 KG/M2 | SYSTOLIC BLOOD PRESSURE: 126 MMHG | WEIGHT: 214.2 LBS | OXYGEN SATURATION: 98 % | HEART RATE: 102 BPM

## 2022-04-06 DIAGNOSIS — Z12.11 SCREENING FOR COLON CANCER: ICD-10-CM

## 2022-04-06 DIAGNOSIS — E53.8 VITAMIN B12 DEFICIENCY: ICD-10-CM

## 2022-04-06 DIAGNOSIS — E55.9 VITAMIN D DEFICIENCY: ICD-10-CM

## 2022-04-06 DIAGNOSIS — K21.9 CHRONIC GERD: ICD-10-CM

## 2022-04-06 DIAGNOSIS — I10 ESSENTIAL HYPERTENSION: ICD-10-CM

## 2022-04-06 DIAGNOSIS — E78.2 MIXED HYPERLIPIDEMIA: ICD-10-CM

## 2022-04-06 DIAGNOSIS — R73.09 ELEVATED HEMOGLOBIN A1C: ICD-10-CM

## 2022-04-06 DIAGNOSIS — E66.09 CLASS 1 OBESITY DUE TO EXCESS CALORIES WITH SERIOUS COMORBIDITY AND BODY MASS INDEX (BMI) OF 33.0 TO 33.9 IN ADULT: ICD-10-CM

## 2022-04-06 DIAGNOSIS — E78.2 MIXED HYPERLIPIDEMIA: Primary | ICD-10-CM

## 2022-04-06 LAB
25(OH)D3 SERPL-MCNC: 16.9 NG/ML (ref 30–100)
ALBUMIN SERPL-MCNC: 4.7 G/DL (ref 3.5–5.2)
ALBUMIN/GLOB SERPL: 1.3 G/DL
ALP SERPL-CCNC: 122 U/L (ref 39–117)
ALT SERPL W P-5'-P-CCNC: 26 U/L (ref 1–41)
ANION GAP SERPL CALCULATED.3IONS-SCNC: 11.6 MMOL/L (ref 5–15)
AST SERPL-CCNC: 17 U/L (ref 1–40)
BILIRUB SERPL-MCNC: 0.4 MG/DL (ref 0–1.2)
BUN SERPL-MCNC: 17 MG/DL (ref 6–20)
BUN/CREAT SERPL: 17.3 (ref 7–25)
CALCIUM SPEC-SCNC: 9.6 MG/DL (ref 8.6–10.5)
CHLORIDE SERPL-SCNC: 104 MMOL/L (ref 98–107)
CHOLEST SERPL-MCNC: 153 MG/DL (ref 0–200)
CO2 SERPL-SCNC: 23.4 MMOL/L (ref 22–29)
CREAT SERPL-MCNC: 0.98 MG/DL (ref 0.76–1.27)
EGFRCR SERPLBLD CKD-EPI 2021: 93.9 ML/MIN/1.73
EXPIRATION DATE: NORMAL
GLOBULIN UR ELPH-MCNC: 3.5 GM/DL
GLUCOSE SERPL-MCNC: 103 MG/DL (ref 65–99)
HBA1C MFR BLD: 5.8 %
HDLC SERPL-MCNC: 45 MG/DL (ref 40–60)
LDLC SERPL CALC-MCNC: 88 MG/DL (ref 0–100)
LDLC/HDLC SERPL: 1.92 {RATIO}
Lab: NORMAL
POTASSIUM SERPL-SCNC: 4.1 MMOL/L (ref 3.5–5.2)
PROT SERPL-MCNC: 8.2 G/DL (ref 6–8.5)
SODIUM SERPL-SCNC: 139 MMOL/L (ref 136–145)
TRIGL SERPL-MCNC: 108 MG/DL (ref 0–150)
VIT B12 BLD-MCNC: 462 PG/ML (ref 211–946)
VLDLC SERPL-MCNC: 20 MG/DL (ref 5–40)

## 2022-04-06 PROCEDURE — 83036 HEMOGLOBIN GLYCOSYLATED A1C: CPT | Performed by: FAMILY MEDICINE

## 2022-04-06 PROCEDURE — 80061 LIPID PANEL: CPT | Performed by: FAMILY MEDICINE

## 2022-04-06 PROCEDURE — 99214 OFFICE O/P EST MOD 30 MIN: CPT | Performed by: FAMILY MEDICINE

## 2022-04-06 PROCEDURE — 82306 VITAMIN D 25 HYDROXY: CPT | Performed by: FAMILY MEDICINE

## 2022-04-06 PROCEDURE — 80053 COMPREHEN METABOLIC PANEL: CPT | Performed by: FAMILY MEDICINE

## 2022-04-06 PROCEDURE — 82607 VITAMIN B-12: CPT | Performed by: FAMILY MEDICINE

## 2022-04-06 RX ORDER — CYCLOSPORINE 0.5 MG/ML
EMULSION OPHTHALMIC
COMMUNITY
Start: 2022-02-27

## 2022-04-06 RX ORDER — LEVOCETIRIZINE DIHYDROCHLORIDE 5 MG/1
5 TABLET, FILM COATED ORAL
COMMUNITY
Start: 2022-01-03 | End: 2022-05-16

## 2022-04-06 RX ORDER — CYCLOSPORINE 0.5 MG/ML
1 EMULSION OPHTHALMIC EVERY 12 HOURS
COMMUNITY
Start: 2022-02-15 | End: 2023-02-15

## 2022-04-06 RX ORDER — FLUOROMETHOLONE 0.1 %
SUSPENSION, DROPS(FINAL DOSAGE FORM)(ML) OPHTHALMIC (EYE)
COMMUNITY
Start: 2022-02-15

## 2022-04-06 RX ORDER — FAMOTIDINE 20 MG/1
TABLET, FILM COATED ORAL
COMMUNITY
Start: 2022-01-04 | End: 2022-05-16

## 2022-04-06 NOTE — PROGRESS NOTES
Subjective     Eric Johnson is a 51 y.o. male.     Chief Complaint   Patient presents with   • Hypertension   • Heartburn   • Hyperlipidemia       History of Present Illness     GERD; sx well controlled with Rx, no S/E repeated     HTN  -follow-up of hypertension, BP elevated as he is fasting and di not take his medication today . Home BP wnl  -salt intake : low sodium diet   -associated signs and symptoms: none  -medication compliance: taking as prescribed, No S/E reported from current Rx   -Denies  blurred vision, chest pain, neck pain, orthopnea or shortness of breath.       Elevated A1c; eats RD , he dose not like sweats   Today A1c IS same as before   Lab Results   Component Value Date    HGBA1C 5.8 04/06/2022       HLD  Last lipid tests were reviewed , last time was wnl.  Eating RD , not doing daily exercise   Pertinent negatives include no chest pain or shortness of breath.   Current antihyperlipidemic treatment includes statins.    Risk factors for coronary artery disease include dyslipidemia.   Lab Results   Component Value Date    CHOL 148 10/18/2021    TRIG 103 10/18/2021    HDL 43 10/18/2021    LDL 86 10/18/2021     SA; he is following with allergy dr , tested +ve for many things , feels better , on weekly shots     Vit D and B12 dific ; was on Rx , feels fatigue and tired, not depressed     Obesity; eats RD , not doing exercise       The following portions of the patient's history were reviewed and updated as appropriate: allergies, current medications, past family history, past medical history, past social history, past surgical history and problem list.        Review of Systems   Constitutional: Positive for fatigue. Negative for activity change, appetite change, chills and fever.   Cardiovascular: Negative for chest pain, palpitations and leg swelling.   Gastrointestinal: Positive for GERD.       Vitals:    04/06/22 1449   BP: 126/98   Pulse: 102   Temp: 96.8 °F (36 °C)   SpO2: 98%            04/06/22  1449   Weight: 97.2 kg (214 lb 3.2 oz)         Body mass index is 33.5 kg/m².      Current Outpatient Medications   Medication Sig Dispense Refill   • amLODIPine (NORVASC) 10 MG tablet Take 1 tablet by mouth Daily. 90 tablet 1   • aspirin (aspirin) 81 MG EC tablet Take 1 tablet by mouth Daily. 90 tablet 1   • atorvastatin (Lipitor) 10 MG tablet Take 1 tablet by mouth Daily. 90 tablet 1   • cycloSPORINE (RESTASIS) 0.05 % ophthalmic emulsion Apply 1 drop to eye(s) as directed by provider Every 12 (Twelve) Hours.     • Dupixent 300 MG/2ML solution prefilled syringe      • famotidine (PEPCID) 20 MG tablet      • fluticasone (FLONASE) 50 MCG/ACT nasal spray 2 sprays into the nostril(s) as directed by provider Daily. 16 g 5   • levocetirizine (XYZAL) 5 MG tablet Take 5 mg by mouth.     • lisinopril (PRINIVIL,ZESTRIL) 10 MG tablet Take 1 tablet by mouth Daily. 90 tablet 1   • loratadine (Claritin) 10 MG tablet Take 1 tablet by mouth Daily. 90 tablet 1   • montelukast (SINGULAIR) 10 MG tablet Take 1 tablet by mouth Every Night. 90 tablet 1   • nystatin-triamcinolone (MYCOLOG) 128541-1.1 UNIT/GM-% ointment Apply  topically to the appropriate area as directed 2 (Two) Times a Day. 60 g 1   • pantoprazole (PROTONIX) 40 MG EC tablet Take 1 tablet by mouth Every Morning Before Breakfast. 90 tablet 1   • Ventolin  (90 Base) MCG/ACT inhaler      • azelastine (ASTEPRO) 0.15 % solution nasal spray      • Breo Ellipta 200-25 MCG/INH inhaler      • fluorometholone (FML) 0.1 % ophthalmic suspension      • Restasis 0.05 % ophthalmic emulsion        No current facility-administered medications for this visit.                Objective   Physical Exam  Vitals and nursing note reviewed.   Constitutional:       General: He is not in acute distress.     Appearance: He is obese. He is not ill-appearing, toxic-appearing or diaphoretic.   HENT:      Nose: Nose normal. No congestion or rhinorrhea.      Mouth/Throat:      Mouth:  Mucous membranes are moist.      Pharynx: No oropharyngeal exudate or posterior oropharyngeal erythema.   Cardiovascular:      Rate and Rhythm: Normal rate and regular rhythm.      Heart sounds: Normal heart sounds. No murmur heard.  Pulmonary:      Effort: Pulmonary effort is normal. No respiratory distress.      Breath sounds: Normal breath sounds. No stridor. No wheezing, rhonchi or rales.   Abdominal:      General: Abdomen is flat. There is no distension.      Palpations: Abdomen is soft. There is no mass.      Tenderness: There is no abdominal tenderness. There is no guarding or rebound.      Hernia: No hernia is present.   Musculoskeletal:      Cervical back: Neck supple.   Skin:     General: Skin is warm.      Coloration: Skin is not pale.      Findings: No erythema.   Neurological:      Mental Status: He is alert and oriented to person, place, and time.   Psychiatric:         Mood and Affect: Mood normal.         Behavior: Behavior normal.         Thought Content: Thought content normal.           Assessment/Plan   Diagnoses and all orders for this visit:    1. Mixed hyperlipidemia (Primary)  - Stable, continue current Rx  -     Lipid Panel; Future  Encouraged patient to maintain a low cholesterol/DASH diet, increase aerobic exercise as tolerated, decrease alcohol, stop smoking if applicable, increase fiber intake, limit sodium intake to no more than 1,500mg/day, increase omega-3 fatty acids, and maintain medication compliance.      2. Essential hypertension  - Close monitoring and f/u   -     Comprehensive Metabolic Panel; Future  Encouraged patient to maintain a heart healthy diet/DASH diet, exercise as tolerated, limit sodium intake to no more than 1,500mg/day, limit alcohol intake, maintain medication compliance and practice relaxation techniques to reduce stress.     3. Elevated hemoglobin A1c  -     POC Glycosylated Hemoglobin (Hb A1C)--> stable  - Discussed in length about lifestyle modification , wt  loss, eating healthy , daily exercise     4. Screening for colon cancer  -     Ambulatory Referral to Gastroenterology    5. Vitamin D deficiency  -     Vitamin D 25 Hydroxy; Future    6. Vitamin B12 deficiency  -     Vitamin B12; Future    7. Chronic GERD  - Stable, continue current Rx    8. Obesity; BMI 33.5   - Discussed in length about lifestyle modification , wt loss, eating healthy , daily exercise            I was wearing N95 mask and eye protection during the entire duration of the visit.   Patient was masked the whole entire time.   Minimum social distance of 6 ft maintained through entire visit except for physical exam as documented.          I have fully discussed the nature of the medical condition(s) risks, complications, management, safe and proper use of medications.   I have discussed the SIDE EFFECT OF MEDICATION and importance TO report any side effect , the patient expressed good understanding.  Encouraged medication compliance and the importance of keeping scheduled follow up appointments with me and any other providers.    Patient instructed to follow up with our office for results on any labs/imaging ordered during this visit.    Home care discussed  All questions answered  Patient verbalizes understanding and agrees to treatment plan.     Follow up: Return in about 6 weeks (around 5/18/2022) for physical.

## 2022-04-07 ENCOUNTER — TELEPHONE (OUTPATIENT)
Dept: INTERNAL MEDICINE | Facility: CLINIC | Age: 52
End: 2022-04-07

## 2022-04-07 DIAGNOSIS — E55.9 VITAMIN D DEFICIENCY: Primary | ICD-10-CM

## 2022-04-07 RX ORDER — ERGOCALCIFEROL 1.25 MG/1
50000 CAPSULE ORAL WEEKLY
Qty: 5 CAPSULE | Refills: 3 | Status: SHIPPED | OUTPATIENT
Start: 2022-04-07 | End: 2022-11-21 | Stop reason: SDUPTHER

## 2022-04-07 NOTE — TELEPHONE ENCOUNTER
----- Message from Tj Caba MD sent at 4/7/2022 12:53 PM EDT -----  PLEASE call for lab results, showed ;  1. improvement in cholesterol , will continue same Rx and diet   2. Low vit D , script sent , needs one cap /week

## 2022-05-04 DIAGNOSIS — Z12.11 COLON CANCER SCREENING: Primary | ICD-10-CM

## 2022-05-04 RX ORDER — SODIUM, POTASSIUM,MAG SULFATES 17.5-3.13G
1 SOLUTION, RECONSTITUTED, ORAL ORAL TAKE AS DIRECTED
Qty: 354 ML | Refills: 0 | Status: SHIPPED | OUTPATIENT
Start: 2022-05-04 | End: 2022-11-16

## 2022-05-16 ENCOUNTER — OFFICE VISIT (OUTPATIENT)
Dept: INTERNAL MEDICINE | Facility: CLINIC | Age: 52
End: 2022-05-16

## 2022-05-16 VITALS
BODY MASS INDEX: 32.53 KG/M2 | SYSTOLIC BLOOD PRESSURE: 152 MMHG | HEART RATE: 82 BPM | OXYGEN SATURATION: 98 % | TEMPERATURE: 97.8 F | DIASTOLIC BLOOD PRESSURE: 80 MMHG | RESPIRATION RATE: 16 BRPM | WEIGHT: 208 LBS

## 2022-05-16 DIAGNOSIS — E55.9 VITAMIN D DEFICIENCY: ICD-10-CM

## 2022-05-16 DIAGNOSIS — Z29.9 PREVENTIVE MEASURE: ICD-10-CM

## 2022-05-16 DIAGNOSIS — I10 ESSENTIAL HYPERTENSION: ICD-10-CM

## 2022-05-16 DIAGNOSIS — K21.9 CHRONIC GERD: ICD-10-CM

## 2022-05-16 DIAGNOSIS — Z00.00 ENCOUNTER FOR ANNUAL PHYSICAL EXAM: Primary | ICD-10-CM

## 2022-05-16 DIAGNOSIS — E78.2 MIXED HYPERLIPIDEMIA: ICD-10-CM

## 2022-05-16 PROCEDURE — 99396 PREV VISIT EST AGE 40-64: CPT | Performed by: FAMILY MEDICINE

## 2022-05-16 RX ORDER — ASPIRIN 81 MG/1
81 TABLET ORAL DAILY
Qty: 90 TABLET | Refills: 1 | Status: SHIPPED | OUTPATIENT
Start: 2022-05-16

## 2022-05-16 RX ORDER — AMLODIPINE BESYLATE 10 MG/1
10 TABLET ORAL DAILY
Qty: 90 TABLET | Refills: 1 | Status: SHIPPED | OUTPATIENT
Start: 2022-05-16 | End: 2022-11-16 | Stop reason: SDUPTHER

## 2022-05-16 RX ORDER — ATORVASTATIN CALCIUM 10 MG/1
10 TABLET, FILM COATED ORAL DAILY
Qty: 90 TABLET | Refills: 3 | Status: SHIPPED | OUTPATIENT
Start: 2022-05-16 | End: 2022-11-16 | Stop reason: SDUPTHER

## 2022-05-16 RX ORDER — LISINOPRIL 10 MG/1
10 TABLET ORAL DAILY
Qty: 90 TABLET | Refills: 3 | Status: SHIPPED | OUTPATIENT
Start: 2022-05-16 | End: 2022-11-16 | Stop reason: SDUPTHER

## 2022-05-16 NOTE — PROGRESS NOTES
Patient Care Team:  Tj Caba MD as PCP - General (Family Medicine)     Chief complaint: Patient is in today for a physical          Patient is a 51 y.o. male who presents for his yearly physical exam.     HPI      HTN; [facility] BP [facility] , on HD , no S/E REPORTED   HLD; eats HD, on statin , no S/E reported   GERD; well controlled with PPI  Low Vit D; on weekly pills    Health maintenance/lifestyle:  Immunization History   Administered Date(s) Administered   • Flu Vaccine Quad PF >36MO 11/01/2016, 01/03/2018, 11/01/2018   • Fluzone Split Quad (Multi-dose) 01/03/2018   • Hepatitis A 11/01/2018, 07/25/2019   • Influenza Quad Vaccine (Inpatient) 11/01/2016   • Pneumococcal Polysaccharide (PPSV23) 01/03/2018   • Tdap 11/01/2016         HM;  Colorectal Screening:  scheduled in 2 weeks         PHQ-2 Depression Screening  Little interest or pleasure in doing things?  0   Feeling down, depressed, or hopeless?  0   PHQ-2 Total Score  0     Social History     Tobacco Use   Smoking Status Never Smoker   Smokeless Tobacco Never Used     Social History     Substance and Sexual Activity   Alcohol Use Never         Review of Systems   Respiratory: Negative for cough, shortness of breath, wheezing and stridor.    Cardiovascular: Negative for chest pain, palpitations and leg swelling.   All other systems reviewed and are negative.        History  History reviewed. No pertinent past medical history.   History reviewed. No pertinent surgical history.   No Known Allergies   History reviewed. No pertinent family history.  Social History     Socioeconomic History   • Marital status:    Tobacco Use   • Smoking status: Never Smoker   • Smokeless tobacco: Never Used   Vaping Use   • Vaping Use: Never used   Substance and Sexual Activity   • Alcohol use: Never   • Drug use: Never   • Sexual activity: Defer        Current Outpatient Medications:   •  amLODIPine (NORVASC) 10 MG tablet, Take 1 tablet by mouth Daily.,  Disp: 90 tablet, Rfl: 1  •  aspirin (aspirin) 81 MG EC tablet, Take 1 tablet by mouth Daily., Disp: 90 tablet, Rfl: 1  •  atorvastatin (Lipitor) 10 MG tablet, Take 1 tablet by mouth Daily., Disp: 90 tablet, Rfl: 3  •  cycloSPORINE (RESTASIS) 0.05 % ophthalmic emulsion, Apply 1 drop to eye(s) as directed by provider Every 12 (Twelve) Hours., Disp: , Rfl:   •  Dupixent 300 MG/2ML solution prefilled syringe, , Disp: , Rfl:   •  fluorometholone (FML) 0.1 % ophthalmic suspension, , Disp: , Rfl:   •  lisinopril (PRINIVIL,ZESTRIL) 10 MG tablet, Take 1 tablet by mouth Daily., Disp: 90 tablet, Rfl: 3  •  nystatin-triamcinolone (MYCOLOG) 761278-6.1 UNIT/GM-% ointment, Apply  topically to the appropriate area as directed 2 (Two) Times a Day., Disp: 60 g, Rfl: 1  •  pantoprazole (PROTONIX) 40 MG EC tablet, Take 1 tablet by mouth Every Morning Before Breakfast., Disp: 90 tablet, Rfl: 1  •  Restasis 0.05 % ophthalmic emulsion, , Disp: , Rfl:   •  sodium-potassium-magnesium sulfates (Suprep Bowel Prep Kit) 17.5-3.13-1.6 GM/177ML solution oral solution, Take 1 bottle by mouth Take As Directed. Follow instructions that were mailed to your home. If you didn't receive these call [phone]., Disp: 354 mL, Rfl: 0  •  vitamin D (ERGOCALCIFEROL) 1.25 MG (59525 UT) capsule capsule, Take 1 capsule by mouth 1 (One) Time Per Week., Disp: 5 capsule, Rfl: 3                  /80   Pulse 82   Temp 97.8 °F (36.6 °C) (Infrared)   Resp 16   Wt 94.3 kg (208 lb)   SpO2 98%   BMI 32.53 kg/m²       Physical Exam              Diagnoses and all orders for this visit:    1. Encounter for annual physical exam (Primary)    2. Essential hypertension  - Well controlled , will continue current Rx  -     amLODIPine (NORVASC) 10 MG tablet; Take 1 tablet by mouth Daily.  Dispense: 90 tablet; Refill: 1  -     lisinopril (PRINIVIL,ZESTRIL) 10 MG tablet; Take 1 tablet by mouth Daily.  Dispense: 90 tablet; Refill: 3    3. Chronic GERD    4. Mixed  hyperlipidemia  - Well controlled , will continue current Rx  -     atorvastatin (Lipitor) 10 MG tablet; Take 1 tablet by mouth Daily.  Dispense: 90 tablet; Refill: 3    5. Vitamin D deficiency  - Well controlled , will continue current Rx    6. Preventive measure  -     aspirin (aspirin) 81 MG EC tablet; Take 1 tablet by mouth Daily.  Dispense: 90 tablet; Refill: 1          Discussed with pt; Regular exercise, healthy diet. Calcium intake, Sunscreen use encouraged.     Follow up: Return in about 6 months (around 11/16/2022) for follow up on current illness.  Plan of care discussed with pt. They verbalized understanding and agreement.     Tj Caba MD   5/16/2022   14:46 EDT

## 2022-05-31 ENCOUNTER — LAB REQUISITION (OUTPATIENT)
Dept: LAB | Facility: HOSPITAL | Age: 52
End: 2022-05-31

## 2022-05-31 ENCOUNTER — OUTSIDE FACILITY SERVICE (OUTPATIENT)
Dept: GASTROENTEROLOGY | Facility: CLINIC | Age: 52
End: 2022-05-31

## 2022-05-31 DIAGNOSIS — D12.3 BENIGN NEOPLASM OF TRANSVERSE COLON: ICD-10-CM

## 2022-05-31 DIAGNOSIS — K64.8 OTHER HEMORRHOIDS: ICD-10-CM

## 2022-05-31 DIAGNOSIS — Z12.11 ENCOUNTER FOR SCREENING FOR MALIGNANT NEOPLASM OF COLON: ICD-10-CM

## 2022-05-31 DIAGNOSIS — K57.30 DIVERTICULOSIS OF LARGE INTESTINE WITHOUT PERFORATION OR ABSCESS WITHOUT BLEEDING: ICD-10-CM

## 2022-05-31 PROCEDURE — 45385 COLONOSCOPY W/LESION REMOVAL: CPT | Performed by: INTERNAL MEDICINE

## 2022-05-31 PROCEDURE — 88305 TISSUE EXAM BY PATHOLOGIST: CPT | Performed by: INTERNAL MEDICINE

## 2022-06-02 LAB
CYTO UR: NORMAL
LAB AP CASE REPORT: NORMAL
LAB AP CLINICAL INFORMATION: NORMAL
PATH REPORT.FINAL DX SPEC: NORMAL
PATH REPORT.GROSS SPEC: NORMAL

## 2022-11-16 ENCOUNTER — LAB (OUTPATIENT)
Dept: LAB | Facility: HOSPITAL | Age: 52
End: 2022-11-16

## 2022-11-16 ENCOUNTER — OFFICE VISIT (OUTPATIENT)
Dept: INTERNAL MEDICINE | Facility: CLINIC | Age: 52
End: 2022-11-16

## 2022-11-16 VITALS
OXYGEN SATURATION: 97 % | SYSTOLIC BLOOD PRESSURE: 128 MMHG | RESPIRATION RATE: 18 BRPM | WEIGHT: 226.6 LBS | HEIGHT: 67 IN | TEMPERATURE: 97.1 F | BODY MASS INDEX: 35.56 KG/M2 | HEART RATE: 84 BPM | DIASTOLIC BLOOD PRESSURE: 76 MMHG

## 2022-11-16 DIAGNOSIS — R73.03 PREDIABETES: Primary | ICD-10-CM

## 2022-11-16 DIAGNOSIS — I10 ESSENTIAL HYPERTENSION: ICD-10-CM

## 2022-11-16 DIAGNOSIS — Z79.899 ENCOUNTER FOR MONITORING LONG-TERM PROTON PUMP INHIBITOR THERAPY: ICD-10-CM

## 2022-11-16 DIAGNOSIS — E55.9 VITAMIN D DEFICIENCY: ICD-10-CM

## 2022-11-16 DIAGNOSIS — Z23 NEED FOR COVID-19 VACCINE: ICD-10-CM

## 2022-11-16 DIAGNOSIS — K21.9 CHRONIC GERD: ICD-10-CM

## 2022-11-16 DIAGNOSIS — R73.03 PREDIABETES: ICD-10-CM

## 2022-11-16 DIAGNOSIS — Z51.81 ENCOUNTER FOR MONITORING LONG-TERM PROTON PUMP INHIBITOR THERAPY: ICD-10-CM

## 2022-11-16 DIAGNOSIS — E78.2 MIXED HYPERLIPIDEMIA: ICD-10-CM

## 2022-11-16 LAB
25(OH)D3 SERPL-MCNC: 28.1 NG/ML (ref 30–100)
ALBUMIN SERPL-MCNC: 4.5 G/DL (ref 3.5–5.2)
ALBUMIN/GLOB SERPL: 1.5 G/DL
ALP SERPL-CCNC: 112 U/L (ref 39–117)
ALT SERPL W P-5'-P-CCNC: 25 U/L (ref 1–41)
ANION GAP SERPL CALCULATED.3IONS-SCNC: 10.6 MMOL/L (ref 5–15)
AST SERPL-CCNC: 22 U/L (ref 1–40)
BILIRUB SERPL-MCNC: 0.3 MG/DL (ref 0–1.2)
BUN SERPL-MCNC: 13 MG/DL (ref 6–20)
BUN/CREAT SERPL: 13.1 (ref 7–25)
CALCIUM SPEC-SCNC: 9.6 MG/DL (ref 8.6–10.5)
CHLORIDE SERPL-SCNC: 104 MMOL/L (ref 98–107)
CHOLEST SERPL-MCNC: 199 MG/DL (ref 0–200)
CO2 SERPL-SCNC: 26.4 MMOL/L (ref 22–29)
CREAT SERPL-MCNC: 0.99 MG/DL (ref 0.76–1.27)
EGFRCR SERPLBLD CKD-EPI 2021: 92.2 ML/MIN/1.73
GLOBULIN UR ELPH-MCNC: 3 GM/DL
GLUCOSE SERPL-MCNC: 105 MG/DL (ref 65–99)
HBA1C MFR BLD: 6.4 % (ref 4.8–5.6)
HDLC SERPL-MCNC: 42 MG/DL (ref 40–60)
LDLC SERPL CALC-MCNC: 130 MG/DL (ref 0–100)
LDLC/HDLC SERPL: 3.02 {RATIO}
MAGNESIUM SERPL-MCNC: 2.3 MG/DL (ref 1.6–2.6)
POTASSIUM SERPL-SCNC: 4.3 MMOL/L (ref 3.5–5.2)
PROT SERPL-MCNC: 7.5 G/DL (ref 6–8.5)
SODIUM SERPL-SCNC: 141 MMOL/L (ref 136–145)
TRIGL SERPL-MCNC: 150 MG/DL (ref 0–150)
VIT B12 BLD-MCNC: 479 PG/ML (ref 211–946)
VLDLC SERPL-MCNC: 27 MG/DL (ref 5–40)

## 2022-11-16 PROCEDURE — 0124A COVID-19 (PFIZER) BIVALENT BOOSTER 12+YRS: CPT | Performed by: NURSE PRACTITIONER

## 2022-11-16 PROCEDURE — 91312 COVID-19 (PFIZER) BIVALENT BOOSTER 12+YRS: CPT | Performed by: NURSE PRACTITIONER

## 2022-11-16 PROCEDURE — 80061 LIPID PANEL: CPT

## 2022-11-16 PROCEDURE — 82607 VITAMIN B-12: CPT

## 2022-11-16 PROCEDURE — 83036 HEMOGLOBIN GLYCOSYLATED A1C: CPT

## 2022-11-16 PROCEDURE — 82306 VITAMIN D 25 HYDROXY: CPT

## 2022-11-16 PROCEDURE — 99214 OFFICE O/P EST MOD 30 MIN: CPT | Performed by: NURSE PRACTITIONER

## 2022-11-16 PROCEDURE — 80053 COMPREHEN METABOLIC PANEL: CPT

## 2022-11-16 PROCEDURE — 83735 ASSAY OF MAGNESIUM: CPT

## 2022-11-16 RX ORDER — LORATADINE 10 MG/1
10 TABLET ORAL DAILY
COMMUNITY
Start: 2022-10-13

## 2022-11-16 RX ORDER — AZELASTINE HYDROCHLORIDE AND FLUTICASONE PROPIONATE 137; 50 UG/1; UG/1
SPRAY, METERED NASAL
COMMUNITY
Start: 2022-10-14

## 2022-11-16 RX ORDER — LISINOPRIL 10 MG/1
10 TABLET ORAL DAILY
Qty: 90 TABLET | Refills: 1 | Status: SHIPPED | OUTPATIENT
Start: 2022-11-16

## 2022-11-16 RX ORDER — ATORVASTATIN CALCIUM 10 MG/1
10 TABLET, FILM COATED ORAL DAILY
Qty: 90 TABLET | Refills: 1 | Status: SHIPPED | OUTPATIENT
Start: 2022-11-16

## 2022-11-16 RX ORDER — AMLODIPINE BESYLATE 10 MG/1
10 TABLET ORAL DAILY
Qty: 90 TABLET | Refills: 1 | Status: SHIPPED | OUTPATIENT
Start: 2022-11-16

## 2022-11-16 NOTE — PROGRESS NOTES
Subjective   Demetria Johnson is a 52 y.o. male here to establish care.  Was previously followed by Dr. Caba  Chief Complaint   Patient presents with   • Hypertension     6 month f/u   • Hyperlipidemia     6 month f/u    • Vitamin D Deficiency     6 month f/u       History of Present Illness     Hypertension-chronic.  Currently on amlodipine, and lisinopril.  Compliant with dosing and denies any adverse effects.  Denies headaches, dizziness, visual disturbances, palpitations chest pain, dyspnea, TIA or CVA symptoms, leg pain/claudication symptoms, and edema.    Hyperlipidemia-chronic.  Currently on atorvastatin.  Tolerates well without any side effects  Diet is fairly healthy.  Stays active with work through the day    Vitamin D deficiency-takes vitamin D 50,000 units weekly.  Has been off of this for a while    GERD- well controlled as long as he takes his pantoprazole.  If he misses a dose symptoms return.  No melena or hematochezia.     See's Dr Tello and - Dr. Suarez   dry eyes and allergies/asthma     Prediabetes-chronic his last hemoglobin A1c was 5.8%.  He has not required any antidiabetic agents.  No polyuria, polydipsia, or polyphagia.      The following portions of the patient's history were reviewed and updated as appropriate: allergies, current medications, past family history, past medical history, past social history, past surgical history and problem list.    Review of Systems   Constitutional: Negative for fatigue, fever and unexpected weight loss.   Eyes: Negative for blurred vision, double vision, pain and visual disturbance.        Dry eyes   Respiratory: Negative for cough, chest tightness, shortness of breath and wheezing.    Cardiovascular: Negative for chest pain, palpitations and leg swelling.   Gastrointestinal: Negative for abdominal pain, constipation, diarrhea, nausea and vomiting.   Genitourinary: Negative for difficulty urinating, frequency and urgency.    Musculoskeletal: Negative for arthralgias and myalgias.   Skin: Negative for color change and rash.   Neurological: Negative for dizziness, weakness, light-headedness, headache and confusion.   Hematological: Negative for adenopathy. Does not bruise/bleed easily.     Lab Results   Component Value Date    HGBA1C 5.8 04/06/2022     Lab Results   Component Value Date    GLUCOSE 103 (H) 04/06/2022    BUN 17 04/06/2022    CREATININE 0.98 04/06/2022    EGFRIFNONA 103 03/08/2021    BCR 17.3 04/06/2022    K 4.1 04/06/2022    CO2 23.4 04/06/2022    CALCIUM 9.6 04/06/2022    ALBUMIN 4.70 04/06/2022    AST 17 04/06/2022    ALT 26 04/06/2022     Lab Results   Component Value Date    WBC 6.92 05/05/2021    HGB 14.3 05/05/2021    HCT 42.6 05/05/2021    MCV 87.3 05/05/2021     05/05/2021         No Known Allergies  History reviewed. No pertinent past medical history.  History reviewed. No pertinent surgical history.  History reviewed. No pertinent family history.  Social History     Socioeconomic History   • Marital status:    Tobacco Use   • Smoking status: Never   • Smokeless tobacco: Never   Vaping Use   • Vaping Use: Never used   Substance and Sexual Activity   • Alcohol use: Never   • Drug use: Never   • Sexual activity: Defer     Immunization History   Administered Date(s) Administered   • COVID-19 (PFIZER) BIVALENT BOOSTER 12+YRS 11/16/2022   • COVID-19 (PFIZER) PURPLE CAP 03/16/2021, 04/13/2021   • Flu Vaccine Quad PF >36MO 11/01/2016, 01/03/2018, 11/01/2018   • Fluzone Quad >6mos (Multi-dose) 01/03/2018   • Hepatitis A 11/01/2018, 07/25/2019   • Influenza Quad Vaccine (Inpatient) 11/01/2016   • Pneumococcal Polysaccharide (PPSV23) 01/03/2018   • Tdap 11/01/2016       Current Outpatient Medications:   •  amLODIPine (NORVASC) 10 MG tablet, Take 1 tablet by mouth Daily., Disp: 90 tablet, Rfl: 1  •  aspirin (aspirin) 81 MG EC tablet, Take 1 tablet by mouth Daily., Disp: 90 tablet, Rfl: 1  •  atorvastatin  "(Lipitor) 10 MG tablet, Take 1 tablet by mouth Daily., Disp: 90 tablet, Rfl: 1  •  cycloSPORINE (RESTASIS) 0.05 % ophthalmic emulsion, Apply 1 drop to eye(s) as directed by provider Every 12 (Twelve) Hours., Disp: , Rfl:   •  Dupixent 300 MG/2ML solution prefilled syringe, , Disp: , Rfl:   •  Dymista 137-50 MCG/ACT suspension, , Disp: , Rfl:   •  fluorometholone (FML) 0.1 % ophthalmic suspension, , Disp: , Rfl:   •  lisinopril (PRINIVIL,ZESTRIL) 10 MG tablet, Take 1 tablet by mouth Daily., Disp: 90 tablet, Rfl: 1  •  loratadine (CLARITIN) 10 MG tablet, Take 10 mg by mouth Daily., Disp: , Rfl:   •  nystatin-triamcinolone (MYCOLOG) 339525-0.1 UNIT/GM-% ointment, Apply  topically to the appropriate area as directed 2 (Two) Times a Day., Disp: 60 g, Rfl: 1  •  pantoprazole (PROTONIX) 40 MG EC tablet, Take 1 tablet by mouth Every Morning Before Breakfast., Disp: 90 tablet, Rfl: 1  •  Restasis 0.05 % ophthalmic emulsion, , Disp: , Rfl:   •  vitamin D (ERGOCALCIFEROL) 1.25 MG (15001 UT) capsule capsule, Take 1 capsule by mouth 1 (One) Time Per Week., Disp: 5 capsule, Rfl: 3    Objective   Blood pressure 128/76, pulse 84, temperature 97.1 °F (36.2 °C), temperature source Infrared, resp. rate 18, height 170.3 cm (67.05\"), weight 103 kg (226 lb 9.6 oz), SpO2 97 %.  Physical Exam  Vitals and nursing note reviewed.   Constitutional:       General: He is not in acute distress.     Appearance: Normal appearance. He is well-developed. He is not diaphoretic.   HENT:      Head: Normocephalic and atraumatic.   Eyes:      Conjunctiva/sclera: Conjunctivae normal.   Neck:      Vascular: No JVD.   Cardiovascular:      Rate and Rhythm: Normal rate and regular rhythm.      Heart sounds: Normal heart sounds. No murmur heard.  Pulmonary:      Effort: Pulmonary effort is normal. No respiratory distress.      Breath sounds: Normal breath sounds.   Chest:      Chest wall: No tenderness.   Abdominal:      General: Bowel sounds are normal. There " is no distension.      Palpations: Abdomen is soft.      Tenderness: There is no abdominal tenderness.   Musculoskeletal:      Cervical back: Normal range of motion.   Skin:     General: Skin is warm and dry.      Coloration: Skin is not pale.      Findings: No erythema.   Neurological:      Mental Status: He is alert and oriented to person, place, and time.   Psychiatric:         Speech: Speech normal.         Behavior: Behavior normal.         Thought Content: Thought content normal.         Judgment: Judgment normal.         Assessment & Plan   Diagnoses and all orders for this visit:    1. Prediabetes (Primary)  -     Comprehensive Metabolic Panel; Future  -     Lipid Panel; Future  -     Vitamin D,25-Hydroxy; Future  -     Hemoglobin A1c; Future  -     Vitamin B12; Future  -     Magnesium; Future  Asymptomatic.  We will recheck A1c.  Consider metformin if increasing.  ADA diet.  2. Vitamin D deficiency  -     Comprehensive Metabolic Panel; Future  -     Lipid Panel; Future  -     Vitamin D,25-Hydroxy; Future  -     Hemoglobin A1c; Future  -     Vitamin B12; Future  -     Magnesium; Future  Recheck vitamin D.  Restart supplement if low  3. Essential hypertension  -     Comprehensive Metabolic Panel; Future  -     Lipid Panel; Future  -     Vitamin D,25-Hydroxy; Future  -     Hemoglobin A1c; Future  -     Vitamin B12; Future  -     Magnesium; Future  -     amLODIPine (NORVASC) 10 MG tablet; Take 1 tablet by mouth Daily.  Dispense: 90 tablet; Refill: 1  -     lisinopril (PRINIVIL,ZESTRIL) 10 MG tablet; Take 1 tablet by mouth Daily.  Dispense: 90 tablet; Refill: 1  Well-controlled.  Continue lisinopril and amlodipine  4. Mixed hyperlipidemia  -     Comprehensive Metabolic Panel; Future  -     Lipid Panel; Future  -     Vitamin D,25-Hydroxy; Future  -     Hemoglobin A1c; Future  -     Vitamin B12; Future  -     Magnesium; Future  -     atorvastatin (Lipitor) 10 MG tablet; Take 1 tablet by mouth Daily.  Dispense: 90  tablet; Refill: 1  Continue statin, recheck lipids.  Consider adjusting dose if warranted on results  5. Chronic GERD  -     Comprehensive Metabolic Panel; Future  -     Lipid Panel; Future  -     Vitamin D,25-Hydroxy; Future  -     Hemoglobin A1c; Future  -     Vitamin B12; Future  -     Magnesium; Future  Continue PPI therapy.  Discussed potential complications of long-term use of PPIs.  We will check additional labs GERD precautions advised: Weight loss, avoid alcohol and caffeine, avoid NSAIDs, consume small frequent meals, wear loosefitting clothing, avoid spicy, acidic, or trigger foods.  Elevate the head of the bed 30 degrees at night.  6. Encounter for monitoring long-term proton pump inhibitor therapy  -     Comprehensive Metabolic Panel; Future  -     Lipid Panel; Future  -     Vitamin D,25-Hydroxy; Future  -     Hemoglobin A1c; Future  -     Vitamin B12; Future  -     Magnesium; Future    7. Need for COVID-19 vaccine  -     COVID-19 Bivalent Booster (Pfizer) 12+yrs           Return in about 6 months (around 5/16/2023) for Annual, and need to collect labs today.  Plan of care discussed with pt. They verbalized understanding and agreement.     Dictated Utilizing Dragon Dictation   Please note that portions of this note were completed with a voice recognition program.   Part of this note may be an electronic transcription/translation of spoken language to printed text using the Dragon Dictation System.

## 2022-11-21 DIAGNOSIS — E55.9 VITAMIN D DEFICIENCY: ICD-10-CM

## 2022-11-21 RX ORDER — ERGOCALCIFEROL 1.25 MG/1
50000 CAPSULE ORAL WEEKLY
Qty: 4 CAPSULE | Refills: 3 | Status: SHIPPED | OUTPATIENT
Start: 2022-11-21

## 2022-11-28 DIAGNOSIS — K21.9 CHRONIC GERD: ICD-10-CM

## 2022-11-28 RX ORDER — PANTOPRAZOLE SODIUM 40 MG/1
40 TABLET, DELAYED RELEASE ORAL
Qty: 90 TABLET | Refills: 1 | Status: SHIPPED | OUTPATIENT
Start: 2022-11-28

## 2022-11-28 NOTE — TELEPHONE ENCOUNTER
Caller: Demetria Johnson    Relationship: Self    Best call back number: [phone]     Requested Prescriptions:  HIS STOMACH MEDICATION, PATIENT DIDN'T KNOW THE NAME      Pharmacy where request should be sent: [facility] [id number] [address] AT [facility] [phone] [facility] [phone]      Additional details provided by patient: PATIENT IS OUT OF MEDICATION    Does the patient have less than a 3 day supply:  [x] Yes  [] No    Katelyn Ram Rep   11/28/22 11:37 EST

## 2022-11-28 NOTE — TELEPHONE ENCOUNTER
Rx Refill Note  Requested Prescriptions     Pending Prescriptions Disp Refills   • pantoprazole (PROTONIX) 40 MG EC tablet 90 tablet 1     Sig: Take 1 tablet by mouth Every Morning Before Breakfast.      Last filled:  Last office visit with prescribing clinician: 11/16/2022      Next office visit with prescribing clinician: 5/16/2023     Jessica Zamora MA  11/28/22, 11:58 EST     Please advise this medication was last filled by Dr. Caba.

## 2023-05-01 DIAGNOSIS — E55.9 VITAMIN D DEFICIENCY: ICD-10-CM

## 2023-05-05 NOTE — TELEPHONE ENCOUNTER
Rx Refill Note  Requested Prescriptions     Pending Prescriptions Disp Refills   • vitamin D (ERGOCALCIFEROL) 1.25 MG (15654 UT) capsule capsule [Pharmacy Med Name: VIT D2 (ERGOCAL) 1.25MG(50,000U) CP] 4 capsule 3     Sig: TAKE ONE CAPSULE BY MOUTH ONCE WEEKLY      Last office visit with prescribing clinician: 11/16/2022     Next office visit with prescribing clinician: 5/16/2023       Veronique Marin MA  05/05/23, 08:16 EDT

## 2023-05-06 RX ORDER — ERGOCALCIFEROL 1.25 MG/1
CAPSULE ORAL
Qty: 4 CAPSULE | Refills: 0 | Status: SHIPPED | OUTPATIENT
Start: 2023-05-06

## 2023-05-16 ENCOUNTER — OFFICE VISIT (OUTPATIENT)
Dept: INTERNAL MEDICINE | Facility: CLINIC | Age: 53
End: 2023-05-16
Payer: COMMERCIAL

## 2023-05-16 ENCOUNTER — HOSPITAL ENCOUNTER (OUTPATIENT)
Dept: GENERAL RADIOLOGY | Facility: HOSPITAL | Age: 53
Discharge: HOME | End: 2023-05-16
Admitting: NURSE PRACTITIONER
Payer: COMMERCIAL

## 2023-05-16 ENCOUNTER — TELEPHONE (OUTPATIENT)
Dept: INTERNAL MEDICINE | Facility: CLINIC | Age: 53
End: 2023-05-16

## 2023-05-16 ENCOUNTER — LAB (OUTPATIENT)
Dept: INTERNAL MEDICINE | Facility: CLINIC | Age: 53
End: 2023-05-16
Payer: COMMERCIAL

## 2023-05-16 VITALS
OXYGEN SATURATION: 98 % | SYSTOLIC BLOOD PRESSURE: 124 MMHG | DIASTOLIC BLOOD PRESSURE: 74 MMHG | HEART RATE: 88 BPM | RESPIRATION RATE: 18 BRPM | TEMPERATURE: 97.7 F | BODY MASS INDEX: 34.25 KG/M2 | WEIGHT: 218.2 LBS | HEIGHT: 67 IN

## 2023-05-16 DIAGNOSIS — R10.13 EPIGASTRIC PAIN: ICD-10-CM

## 2023-05-16 DIAGNOSIS — I10 ESSENTIAL HYPERTENSION: ICD-10-CM

## 2023-05-16 DIAGNOSIS — Z00.01 ANNUAL VISIT FOR GENERAL ADULT MEDICAL EXAMINATION WITH ABNORMAL FINDINGS: Primary | ICD-10-CM

## 2023-05-16 DIAGNOSIS — E66.9 OBESITY (BMI 30.0-34.9): ICD-10-CM

## 2023-05-16 DIAGNOSIS — Z13.31 DEPRESSION SCREENING NEGATIVE: ICD-10-CM

## 2023-05-16 DIAGNOSIS — Z00.01 ANNUAL VISIT FOR GENERAL ADULT MEDICAL EXAMINATION WITH ABNORMAL FINDINGS: ICD-10-CM

## 2023-05-16 DIAGNOSIS — R73.03 PREDIABETES: ICD-10-CM

## 2023-05-16 DIAGNOSIS — E55.9 VITAMIN D DEFICIENCY: ICD-10-CM

## 2023-05-16 DIAGNOSIS — K21.9 CHRONIC GERD: ICD-10-CM

## 2023-05-16 DIAGNOSIS — E78.2 MIXED HYPERLIPIDEMIA: ICD-10-CM

## 2023-05-16 DIAGNOSIS — Z23 NEED FOR SHINGLES VACCINE: ICD-10-CM

## 2023-05-16 LAB
25(OH)D3 SERPL-MCNC: 36.4 NG/ML (ref 30–100)
ALBUMIN SERPL-MCNC: 4.4 G/DL (ref 3.5–5.2)
ALBUMIN/GLOB SERPL: 1.4 G/DL
ALP SERPL-CCNC: 106 U/L (ref 39–117)
ALT SERPL W P-5'-P-CCNC: 25 U/L (ref 1–41)
ANION GAP SERPL CALCULATED.3IONS-SCNC: 8 MMOL/L (ref 5–15)
AST SERPL-CCNC: 19 U/L (ref 1–40)
BILIRUB SERPL-MCNC: 0.2 MG/DL (ref 0–1.2)
BUN SERPL-MCNC: 20 MG/DL (ref 6–20)
BUN/CREAT SERPL: 18.3 (ref 7–25)
CALCIUM SPEC-SCNC: 9.2 MG/DL (ref 8.6–10.5)
CHLORIDE SERPL-SCNC: 104 MMOL/L (ref 98–107)
CHOLEST SERPL-MCNC: 186 MG/DL (ref 0–200)
CO2 SERPL-SCNC: 26 MMOL/L (ref 22–29)
CREAT SERPL-MCNC: 1.09 MG/DL (ref 0.76–1.27)
DEPRECATED RDW RBC AUTO: 41.6 FL (ref 37–54)
EGFRCR SERPLBLD CKD-EPI 2021: 82.2 ML/MIN/1.73
ERYTHROCYTE [DISTWIDTH] IN BLOOD BY AUTOMATED COUNT: 13.1 % (ref 12.3–15.4)
GLOBULIN UR ELPH-MCNC: 3.1 GM/DL
GLUCOSE SERPL-MCNC: 129 MG/DL (ref 65–99)
HBA1C MFR BLD: 6.4 % (ref 4.8–5.6)
HCT VFR BLD AUTO: 43.2 % (ref 37.5–51)
HDLC SERPL-MCNC: 40 MG/DL (ref 40–60)
HGB BLD-MCNC: 14.1 G/DL (ref 13–17.7)
LDLC SERPL CALC-MCNC: 105 MG/DL (ref 0–100)
LDLC/HDLC SERPL: 2.46 {RATIO}
MCH RBC QN AUTO: 28.5 PG (ref 26.6–33)
MCHC RBC AUTO-ENTMCNC: 32.6 G/DL (ref 31.5–35.7)
MCV RBC AUTO: 87.4 FL (ref 79–97)
PLATELET # BLD AUTO: 251 10*3/MM3 (ref 140–450)
PMV BLD AUTO: 10.8 FL (ref 6–12)
POTASSIUM SERPL-SCNC: 4.6 MMOL/L (ref 3.5–5.2)
PROT SERPL-MCNC: 7.5 G/DL (ref 6–8.5)
RBC # BLD AUTO: 4.94 10*6/MM3 (ref 4.14–5.8)
SODIUM SERPL-SCNC: 138 MMOL/L (ref 136–145)
TRIGL SERPL-MCNC: 239 MG/DL (ref 0–150)
VLDLC SERPL-MCNC: 41 MG/DL (ref 5–40)
WBC NRBC COR # BLD: 7.48 10*3/MM3 (ref 3.4–10.8)

## 2023-05-16 PROCEDURE — 3074F SYST BP LT 130 MM HG: CPT | Performed by: NURSE PRACTITIONER

## 2023-05-16 PROCEDURE — 80053 COMPREHEN METABOLIC PANEL: CPT | Performed by: NURSE PRACTITIONER

## 2023-05-16 PROCEDURE — 1159F MED LIST DOCD IN RCRD: CPT | Performed by: NURSE PRACTITIONER

## 2023-05-16 PROCEDURE — 93000 ELECTROCARDIOGRAM COMPLETE: CPT | Performed by: NURSE PRACTITIONER

## 2023-05-16 PROCEDURE — 36415 COLL VENOUS BLD VENIPUNCTURE: CPT | Performed by: NURSE PRACTITIONER

## 2023-05-16 PROCEDURE — 90471 IMMUNIZATION ADMIN: CPT | Performed by: NURSE PRACTITIONER

## 2023-05-16 PROCEDURE — 71046 X-RAY EXAM CHEST 2 VIEWS: CPT

## 2023-05-16 PROCEDURE — 86677 HELICOBACTER PYLORI ANTIBODY: CPT | Performed by: NURSE PRACTITIONER

## 2023-05-16 PROCEDURE — 82306 VITAMIN D 25 HYDROXY: CPT | Performed by: NURSE PRACTITIONER

## 2023-05-16 PROCEDURE — 80061 LIPID PANEL: CPT | Performed by: NURSE PRACTITIONER

## 2023-05-16 PROCEDURE — 83036 HEMOGLOBIN GLYCOSYLATED A1C: CPT | Performed by: NURSE PRACTITIONER

## 2023-05-16 PROCEDURE — 1160F RVW MEDS BY RX/DR IN RCRD: CPT | Performed by: NURSE PRACTITIONER

## 2023-05-16 PROCEDURE — 85027 COMPLETE CBC AUTOMATED: CPT | Performed by: NURSE PRACTITIONER

## 2023-05-16 PROCEDURE — 99396 PREV VISIT EST AGE 40-64: CPT | Performed by: NURSE PRACTITIONER

## 2023-05-16 PROCEDURE — 90750 HZV VACC RECOMBINANT IM: CPT | Performed by: NURSE PRACTITIONER

## 2023-05-16 PROCEDURE — 3078F DIAST BP <80 MM HG: CPT | Performed by: NURSE PRACTITIONER

## 2023-05-16 RX ORDER — FLUTICASONE PROPIONATE 50 MCG
SPRAY, SUSPENSION (ML) NASAL
COMMUNITY
Start: 2023-04-27 | End: 2023-05-16 | Stop reason: ALTCHOICE

## 2023-05-16 RX ORDER — BUDESONIDE AND FORMOTEROL FUMARATE DIHYDRATE 160; 4.5 UG/1; UG/1
AEROSOL RESPIRATORY (INHALATION)
COMMUNITY
Start: 2023-04-15

## 2023-05-16 RX ORDER — ALBUTEROL SULFATE 90 UG/1
AEROSOL, METERED RESPIRATORY (INHALATION)
COMMUNITY
Start: 2023-02-07

## 2023-05-16 NOTE — TELEPHONE ENCOUNTER
Pt verbalized good understanding and appreciation    ----- Message from KIMBERLEE Devine sent at 5/16/2023 12:42 PM EDT -----  Please let him know that the chest x-ray looked okay

## 2023-05-16 NOTE — PROGRESS NOTES
Patient Care Team:  Samantha Saravia APRN as PCP - General (Nurse Practitioner)  Silvestre Dominguez MD as Consulting Physician (Gastroenterology)     Chief Complaint   Patient presents with   • Annual Exam     Pt is fasting             Patient is a 52 y.o. male who presents for his yearly physical exam.     HPI   The patient presents to the office today for an annual visit. He has medical conditions significant for hyperlipidemia, GERD, prediabetes, and vitamin D deficiency. He also has dry eyes, allergies, and asthma and follows with Dr. Tello at  as well as Dr. Suarez.     The patient complains of constant abdominal issues. He reports he underwent an esophagogastroduodenoscopy on 10/05/2020, at . He states he has been experiencing pain that he describes as pressure and a burning sensation and tenderness to palpation to the epigastric region for approximately 1 week. The patient reports his pain is worse with deep inspiration, but denies any cough, wheezing, or dyspnea. Additionally, he verbalizes his pain is intermittent lasting only a few minutes in duration. He notes the pain occurs at rest and with activity and is not position related. The patient denies pain subsequent to meals and he denies frequent eructation. He denies any symptoms of nausea, vomiting, diarrhea, constipation, or hematochezia. Additionally, the patient also denies any dysuria, urinary urgency, or urinary frequency. He confirms a normal appetite and has not experienced any unintentional weight loss. The patient reports a history of acid reflux. He does have a history of H. pylori on 01/25/2021, which was treated by Dr. Caba. He is inquiring if his current pain is related to his past symptoms or a new occurrence. He confirms taking pantoprazole every morning, which provides relief. The patient inquires about the possibility of a chest x-ray.      The patient confirms continuing on amlodipine and lisinopril and tolerating these  medications well. He denies any lower extremity swelling, lightheadedness, or dizziness. He reports monitoring his blood pressures at home occasionally and notes he had a systolic elevation of 160 mmHg once last week, although he had not taken his medication at that time. The patient adds that his blood pressure was stable over the next 2 to 3 days with his medication.     He states he is doing well continuing on his cholesterol medication and he is fasting today. He reports he eats late at night after work and notes he is physically active at work, but does not perform exercise outside of work.     The patient is not currently on medication for prediabetes. He denies any symptoms of lightheadedness, dizziness, diaphoresis, or shakiness. He notes he attempts to monitor his sugar intake. The patient adds that he does not enjoy sweets very much, but does consume a small amount of bread.     The patient reports he is scheduled for colonoscopy on 05/31/2023, with Dr. Dominguez, gastroenterologist. He inquires why he needs a repeat colonoscopy at this time as he received a colonoscopy in 2022.    The patient works in construction.     Health maintenance/lifestyle:  Health Maintenance   Topic Date Due   • COLORECTAL CANCER SCREENING  05/31/2023   • ZOSTER VACCINE (2 of 2) 07/11/2023   • INFLUENZA VACCINE  08/01/2023   • LIPID PANEL  11/16/2023   • ANNUAL PHYSICAL  05/16/2024   • TDAP/TD VACCINES (2 - Td or Tdap) 11/01/2026   • HEPATITIS C SCREENING  Completed   • COVID-19 Vaccine  Completed   • Pneumococcal Vaccine 0-64  Aged Out     Immunization History   Administered Date(s) Administered   • COVID-19 (PFIZER) BIVALENT 12+YRS 11/16/2022   • COVID-19 (PFIZER) Purple Cap Monovalent 03/16/2021, 04/13/2021   • Flu Vaccine Quad PF >36MO 11/01/2016, 01/03/2018, 11/01/2018   • Fluzone Quad >6mos (Multi-dose) 01/03/2018   • Hepatitis A 11/01/2018, 07/25/2019   • Influenza Quad Vaccine (Inpatient) 11/01/2016   • Pneumococcal  Polysaccharide (PPSV23) 01/03/2018   • Shingrix 05/16/2023   • Tdap 11/01/2016     Cancer-related family history is not on file.  Sexual activity questions deferred to the physician.  Social History     Tobacco Use   Smoking Status Never   Smokeless Tobacco Never     Social History     Substance and Sexual Activity   Alcohol Use Never       Covid vaccine: Up to date.  Pneumococcal: Up to date.  Influenza: Up to date.  Tetanus: up to date.     Hep C screening: Previous negative hepatitis C screening.     Colon cancer screening: Abnormal colonoscopy 05/31/2022. Repeat colonoscopy scheduled for 05/31/2023.  STI concerns: Denies high-risk behaviors.    Diet: Patient eats late at night after work, but attempts to monitor his sugar intake. He consumes a small amount of bread.  Exercise: Patient is only physically active at work.    PHQ-2 Depression Screening  Little interest or pleasure in doing things? 0-->not at all   Feeling down, depressed, or hopeless? 0-->not at all   PHQ-2 Total Score 0         Review of Systems   Constitutional: Negative for diaphoresis, fatigue, fever and unexpected weight loss.   Eyes: Negative for blurred vision, double vision and visual disturbance.   Respiratory: Negative for cough, shortness of breath and wheezing.         Epigastric pain with deep inspiration.   Cardiovascular: Negative for chest pain, palpitations and leg swelling.   Gastrointestinal: Positive for abdominal pain (Epigastric pain with tenderness to palpation). Negative for blood in stool, constipation, diarrhea, nausea and vomiting.   Endocrine: Negative for polyuria.   Genitourinary: Negative for difficulty urinating, frequency and urgency.   Musculoskeletal: Negative for arthralgias and myalgias.   Skin: Negative for color change and rash.   Neurological: Negative for dizziness, weakness, light-headedness and headache.   Hematological: Negative for adenopathy. Does not bruise/bleed easily.         History  Social History      Socioeconomic History   • Marital status:    Tobacco Use   • Smoking status: Never   • Smokeless tobacco: Never   Vaping Use   • Vaping Use: Never used   Substance and Sexual Activity   • Alcohol use: Never   • Drug use: Never   • Sexual activity: Defer     History reviewed. No pertinent past medical history.   History reviewed. No pertinent surgical history.   No Known Allergies   History reviewed. No pertinent family history.    Current Outpatient Medications:   •  amLODIPine (NORVASC) 10 MG tablet, Take 1 tablet by mouth Daily., Disp: 90 tablet, Rfl: 1  •  aspirin (aspirin) 81 MG EC tablet, Take 1 tablet by mouth Daily., Disp: 90 tablet, Rfl: 1  •  atorvastatin (Lipitor) 10 MG tablet, Take 1 tablet by mouth Daily., Disp: 90 tablet, Rfl: 1  •  Dupixent 300 MG/2ML solution prefilled syringe, , Disp: , Rfl:   •  Dymista 137-50 MCG/ACT suspension, , Disp: , Rfl:   •  lisinopril (PRINIVIL,ZESTRIL) 10 MG tablet, Take 1 tablet by mouth Daily., Disp: 90 tablet, Rfl: 1  •  loratadine (CLARITIN) 10 MG tablet, Take 1 tablet by mouth Daily., Disp: , Rfl:   •  pantoprazole (PROTONIX) 40 MG EC tablet, Take 1 tablet by mouth Every Morning Before Breakfast., Disp: 90 tablet, Rfl: 1  •  Symbicort 160-4.5 MCG/ACT inhaler, , Disp: , Rfl:   •  Ventolin  (90 Base) MCG/ACT inhaler, , Disp: , Rfl:   •  vitamin D (ERGOCALCIFEROL) 1.25 MG (53169 UT) capsule capsule, TAKE ONE CAPSULE BY MOUTH ONCE WEEKLY, Disp: 4 capsule, Rfl: 0  •  fluorometholone (FML) 0.1 % ophthalmic suspension, , Disp: , Rfl:   •  nystatin-triamcinolone (MYCOLOG) 078041-1.1 UNIT/GM-% ointment, Apply  topically to the appropriate area as directed 2 (Two) Times a Day. (Patient not taking: Reported on 5/16/2023), Disp: 60 g, Rfl: 1  •  Restasis 0.05 % ophthalmic emulsion, , Disp: , Rfl:                   /74 (BP Location: Right arm, Patient Position: Sitting, Cuff Size: Adult)   Pulse 88   Temp 97.7 °F (36.5 °C) (Infrared)   Resp 18   Ht  "170.3 cm (67.05\")   Wt 99 kg (218 lb 3.2 oz)   SpO2 98%   BMI 34.12 kg/m²       Physical Exam  Vitals and nursing note reviewed.   Constitutional:       General: He is not in acute distress.     Appearance: Normal appearance. He is well-developed. He is obese. He is not diaphoretic.   HENT:      Head: Normocephalic and atraumatic.      Right Ear: External ear normal.      Left Ear: External ear normal.      Nose: Nose normal.   Eyes:      General: No scleral icterus.        Right eye: No discharge.         Left eye: No discharge.      Conjunctiva/sclera: Conjunctivae normal.      Pupils: Pupils are equal, round, and reactive to light.   Neck:      Thyroid: No thyromegaly.      Vascular: No JVD (no bruits).      Trachea: No tracheal deviation.   Cardiovascular:      Rate and Rhythm: Normal rate and regular rhythm.      Heart sounds: No murmur heard.    No friction rub. No gallop.   Pulmonary:      Effort: Pulmonary effort is normal. No respiratory distress.      Breath sounds: Normal breath sounds. No wheezing or rales.   Chest:      Chest wall: No tenderness.   Abdominal:      General: Bowel sounds are normal. There is no distension.      Palpations: Abdomen is soft. There is no mass.      Tenderness: There is no abdominal tenderness. There is no guarding or rebound.      Hernia: No hernia is present.      Comments: Mild tenderness to palpation to the mid epigastric region with no masses or abnormalities noted.    Genitourinary:     Comments: deferred  Musculoskeletal:         General: No tenderness or deformity. Normal range of motion.      Cervical back: Normal range of motion and neck supple.   Lymphadenopathy:      Cervical: No cervical adenopathy.   Skin:     General: Skin is warm and dry.      Coloration: Skin is not pale.      Findings: No erythema or rash.   Neurological:      Mental Status: He is alert and oriented to person, place, and time.      Motor: No abnormal muscle tone.      Deep Tendon " Reflexes: Reflexes are normal and symmetric. Reflexes normal.   Psychiatric:         Behavior: Behavior normal.         Thought Content: Thought content normal.         Judgment: Judgment normal.             ECG 12 Lead    Date/Time: 5/16/2023 8:53 AM  Performed by: Samantha Saravia APRN  Authorized by: Samantha Saravia APRN   Comparison: not compared with previous ECG   Previous ECG: no previous ECG available  Rhythm: sinus rhythm  Rate: normal  BPM: 79  Conduction: conduction normal  ST Segments: ST segments normal    Clinical impression: normal ECG  Comments:   QRS 87  QT/QTc 361/25  [id number]  30  29                  Diagnoses and all orders for this visit:    1. Annual visit for general adult medical examination with abnormal findings (Primary)  -     CBC (No Diff); Future  -     Comprehensive Metabolic Panel; Future  -     Lipid Panel; Future  -     Hemoglobin A1c; Future    2. Essential hypertension  -     CBC (No Diff); Future  -     Comprehensive Metabolic Panel; Future  -     Lipid Panel; Future  -     Hemoglobin A1c; Future  -     ECG 12 Lead    3. Mixed hyperlipidemia  -     CBC (No Diff); Future  -     Comprehensive Metabolic Panel; Future  -     Lipid Panel; Future  -     Hemoglobin A1c; Future    4. Prediabetes  -     CBC (No Diff); Future  -     Comprehensive Metabolic Panel; Future  -     Lipid Panel; Future  -     Hemoglobin A1c; Future    5. Chronic GERD  -     CBC (No Diff); Future  -     Comprehensive Metabolic Panel; Future  -     Lipid Panel; Future  -     Hemoglobin A1c; Future  -     Cancel: Helicobacter Pylori, IgA IgG IgM; Future  -     Helicobacter Pylori, IgA IgG IgM; Future    6. Epigastric pain  -     CBC (No Diff); Future  -     Comprehensive Metabolic Panel; Future  -     Cancel: Helicobacter Pylori, IgA IgG IgM; Future  -     ECG 12 Lead  -     Helicobacter Pylori, IgA IgG IgM; Future  -     XR Chest PA & Lateral; Future    7. Vitamin D deficiency  -     Vitamin D,25-Hydroxy;  Future    8. Need for shingles vaccine  -     Shingrix Vaccine    9. Depression screening negative    10. Obesity (BMI 30.0-34.9)    BMI is >= 30 and <35. (Class 1 Obesity). The following options were offered after discussion;: exercise counseling/recommendations and nutrition counseling/recommendations      1. Annual examination  - Will obtain routine laboratory studies. Shingles vaccination updated today. Advised a repeat shingles vaccination in 2 to 6 months.     2. Hypertension  - Patient's blood pressure is well controlled today at 124/74 mmHg. Continue on current medications.     3. Hyperlipidemia  - The patient's cholesterol has been elevated per his laboratory studies. Continue on atorvastatin. Encouraged patient to consume a low-fat, low-cholesterol diet where possible. Will obtain laboratory studies today.     4. Prediabetes  - Patient's last hemoglobin A1c on 11/16/2022, was 6.4 percent. Will re-evaluate laboratory studies today.     5. GERD  - Patient underwent esophagogastroduodenoscopy on 10/05/2020. EKG obtained today was with normal results. Chest x-ray ordered. Will repeat H. pylori laboratory studies and esophagogastroduodenoscopy. Will treat H. pylori as indicated depending upon his results. Continue pantoprazole.     6. Vitamin D deficiency  - Will re-evaluate vitamin D level today. Patient takes a vitamin D supplement.      Labs  ordered as above- will notify of results and treat accordingly. If patient has not received results within one week via mychart or letter, they will notify my office  Immunizations and screenings: Shingles vaccine updated today.  Other preventative/screenings are up-to-date/addressed as noted in the HPI.  Counseling: The patient is advised to begin progressive daily aerobic exercise program, follow a low fat, low cholesterol diet, continue current medications and return for routine annual checkups.  Follow up: Return in about 3 months (around 8/16/2023) for  Recheck.  Plan of care discussed with pt. They verbalized understanding and agreement.     Electronically signed by : KIMBERLEE Sutherland    5/16/2023   09:35 EDT            Transcribed from ambient dictation for KIMBERLEE Sutherland by Sania Elder.  05/16/23   10:28 EDT    Patient or patient representative verbalized consent to the visit recording.  I have personally performed the services described in this document as transcribed by the above individual, and it is both accurate and complete.  KIMBERLEE Sutherland  5/16/2023  12:21 EDT

## 2023-05-16 NOTE — LETTER
"VACCINE CONSENT FORM      Patient Name:  Demetria Johnson    Patient :  1970      I/We have read, or have been explained, the information about the diseases and the vaccines listed below.  There was an opportunity to ask questions and any questions were answered satisfactorily.  I/We believe that I/we understand the benefits and risks of the vaccines(s) cited, and ask the vaccine(s) listed below be given to me/us or the person named above (for which i have authorized to make the request).      Vaccine(s) give:    Orders Placed This Encounter   Procedures   • Shingrix Vaccine         Medicare patients:    The only vaccine covered under your medical benefit is flu/pneumonia and hepatitis B.  All other may be covered under your \"Part D\" prescription plan and requires you to go to a pharmacy with a Physician orders for administration.  If you still prefer to have it administered at our office, you will receive a bill for the vaccine and administration cost.               Patient Initials                     Patient or Parent/Guardian Signature                    Date        A copy of the appropriate Centers for Disease Control and Prevention Vaccine Information Statements has been provided.   " Consent (Lip)/Introductory Paragraph: The rationale for Mohs was explained to the patient and consent was obtained. The risks, benefits and alternatives to therapy were discussed in detail. Specifically, the risks of lip deformity, changes in the oral aperture, infection, scarring, bleeding, prolonged wound healing, incomplete removal, allergy to anesthesia, nerve injury and recurrence were addressed. Prior to the procedure, the treatment site was clearly identified and confirmed by the patient. All components of Universal Protocol/PAUSE Rule completed.

## 2023-05-17 LAB
H PYLORI IGA SER-ACNC: 11.8 UNITS (ref 0–8.9)
H PYLORI IGG SER IA-ACNC: 0.47 INDEX VALUE (ref 0–0.79)
H PYLORI IGM SER-ACNC: <9 UNITS (ref 0–8.9)

## 2023-05-22 ENCOUNTER — TELEPHONE (OUTPATIENT)
Dept: INTERNAL MEDICINE | Facility: CLINIC | Age: 53
End: 2023-05-22
Payer: COMMERCIAL

## 2023-05-22 NOTE — TELEPHONE ENCOUNTER
----- Message from KIMBERLEE Devine sent at 5/22/2023  1:11 PM EDT -----  Please let him know that he does not show positive for active H. pylori.  His cholesterol is improving on his LDL but triglycerides are slightly worse glucose slightly elevated with A1c stable at 6.4 the rest of his labs are in acceptable parameters.  Patient should consume lean meats, whole grains, vegetables, and fruits, while avoiding concentrated sweets, junk foods, and fast foods.  Patient should engage in a minimum of 150 minutes of moderate intensity exercise per week as well.

## 2023-05-30 ENCOUNTER — TELEPHONE (OUTPATIENT)
Dept: GASTROENTEROLOGY | Facility: CLINIC | Age: 53
End: 2023-05-30

## 2023-05-31 ENCOUNTER — OUTSIDE FACILITY SERVICE (OUTPATIENT)
Dept: GASTROENTEROLOGY | Facility: CLINIC | Age: 53
End: 2023-05-31
Payer: COMMERCIAL

## 2023-05-31 PROCEDURE — 45378 DIAGNOSTIC COLONOSCOPY: CPT | Performed by: INTERNAL MEDICINE

## 2023-09-19 ENCOUNTER — TELEMEDICINE (OUTPATIENT)
Dept: INTERNAL MEDICINE | Facility: CLINIC | Age: 53
End: 2023-09-19
Payer: COMMERCIAL

## 2023-09-19 VITALS — DIASTOLIC BLOOD PRESSURE: 78 MMHG | SYSTOLIC BLOOD PRESSURE: 122 MMHG | HEART RATE: 64 BPM

## 2023-09-19 DIAGNOSIS — R73.03 PREDIABETES: ICD-10-CM

## 2023-09-19 DIAGNOSIS — E78.2 MIXED HYPERLIPIDEMIA: ICD-10-CM

## 2023-09-19 DIAGNOSIS — Z23 FLU VACCINE NEED: ICD-10-CM

## 2023-09-19 DIAGNOSIS — I10 ESSENTIAL HYPERTENSION: Primary | ICD-10-CM

## 2023-09-19 DIAGNOSIS — K21.9 CHRONIC GERD: ICD-10-CM

## 2023-09-19 LAB
EXPIRATION DATE: NORMAL
HBA1C MFR BLD: 6 %
Lab: NORMAL

## 2023-09-19 RX ORDER — ATORVASTATIN CALCIUM 10 MG/1
10 TABLET, FILM COATED ORAL DAILY
Qty: 90 TABLET | Refills: 1 | Status: SHIPPED | OUTPATIENT
Start: 2023-09-19

## 2023-09-19 RX ORDER — AMLODIPINE BESYLATE 10 MG/1
10 TABLET ORAL DAILY
Qty: 90 TABLET | Refills: 1 | Status: SHIPPED | OUTPATIENT
Start: 2023-09-19

## 2023-09-19 RX ORDER — LISINOPRIL 10 MG/1
10 TABLET ORAL DAILY
Qty: 90 TABLET | Refills: 1 | Status: SHIPPED | OUTPATIENT
Start: 2023-09-19

## 2023-09-19 RX ORDER — PANTOPRAZOLE SODIUM 40 MG/1
40 TABLET, DELAYED RELEASE ORAL
Qty: 90 TABLET | Refills: 1 | Status: SHIPPED | OUTPATIENT
Start: 2023-09-19

## 2023-09-19 NOTE — PROGRESS NOTES
This was an audio and video enabled visit.   This provider is located a their home address in [place] using a secure [employer] Video Visit through [employer] or [employer] (pt preference). Demetria  is being seen remotely via telehealth  in [place]. Pt provided a secure environment for this session.  Demetria may be asked to present for in office testing and/or evaluation if felt to be unsafe for telemedicine.    The provider identified herself /credentials as appropriate.   By proceeding with the telehealth visit, the patient consents to be seen remotely which allows for patient identifiable information to be sent to a third party as needed. The patient may refuse to be seen remotely at any time. The electronic data is encrypted and password protected, and the patient is advised of the potential risks to privacy not withstanding such measures.    You have chosen to receive care through a telehealth visit. Do you consent to use a video/audio connection for your medical care today? Yes      Subjective   Demetria Johnson is a 53 y.o. male.     Chief Complaint   Patient presents with    Prediabetes    Hyperlipidemia    Hypertension    Heartburn       History of Present Illness   The patient presents today via video visit for 3-month follow-up of prediabetes, hyperlipidemia, hypertension, and heartburn. His last laboratory studies including CBC, CMP, lipid panel, and A1c were obtained on 05/16/2023. The laboratory studies did demonstrate his LDL cholesterol is elevated at 105 mg/dL, but this is improved from 10 months ago, when it was 130 mg/dL. He is currently taking atorvastatin 10 mg.     The patient states his blood pressure is running similar to his past visits with occasional systolic elevations in the 130 mmHg range. His blood pressure was approximately 124/74 mmHg at his last in-office visit. He denies any unusual headaches, dizziness, chest pain, heart palpitations, or shortness of breath. He is currently  taking amlodipine and lisinopril and is in need of refills of his medications.     The patient states he continues tolerating atorvastatin which he takes daily. He reports he consumes a healthy diet and avoids sugary foods.     He denies any issues involving abdominal pain, heartburn, or reflux.     The patient reports he experienced temporary numbness with a burning sensation to the plantar aspect of his left foot while sleeping last night, 09/08/2023. He denies sleeping in a position that reduced his circulation. He states he has not experienced this issue in the past.     The patient denies any new issues or concerns today.     He is interested in obtaining his influenza vaccine today.       The following portions of the patient's history were reviewed and updated as appropriate: allergies, current medications, past family history, past medical history, past social history, past surgical history and problem list.        Review of Systems   Constitutional:  Negative for fatigue, fever and unexpected weight loss.   Eyes:  Negative for blurred vision, double vision and visual disturbance.   Respiratory:  Negative for cough, shortness of breath and wheezing.    Cardiovascular:  Negative for chest pain, palpitations and leg swelling.   Gastrointestinal:  Positive for GERD (well controlled). Negative for abdominal pain, constipation, diarrhea, nausea and vomiting.   Genitourinary:  Negative for difficulty urinating, frequency and urgency.   Musculoskeletal:  Negative for arthralgias and myalgias.   Skin:  Negative for color change and rash.   Neurological:  Positive for numbness (left foot - resolved). Negative for dizziness, weakness and headache.   Hematological:  Negative for adenopathy. Does not bruise/bleed easily.         Outpatient Medications Marked as Taking for the 9/19/23 encounter (Telemedicine) with Samantha Saravia APRN   Medication Sig Dispense Refill    amLODIPine (NORVASC) 10 MG tablet Take 1 tablet  by mouth Daily. 90 tablet 1    aspirin (aspirin) 81 MG EC tablet Take 1 tablet by mouth Daily. 90 tablet 1    atorvastatin (Lipitor) 10 MG tablet Take 1 tablet by mouth Daily. 90 tablet 1    Dupixent 300 MG/2ML solution prefilled syringe       Dymista 137-50 MCG/ACT suspension       fluorometholone (FML) 0.1 % ophthalmic suspension       lisinopril (PRINIVIL,ZESTRIL) 10 MG tablet Take 1 tablet by mouth Daily. 90 tablet 1    loratadine (CLARITIN) 10 MG tablet Take 1 tablet by mouth Daily.      nystatin-triamcinolone (MYCOLOG) 971635-8.1 UNIT/GM-% ointment Apply  topically to the appropriate area as directed 2 (Two) Times a Day. 60 g 1    pantoprazole (PROTONIX) 40 MG EC tablet Take 1 tablet by mouth Every Morning Before Breakfast. 90 tablet 1    Restasis 0.05 % ophthalmic emulsion       Sodium Sulfate-Mag Sulfate-KCl 2424-976-723 MG tablet Take 24 tablets by mouth Take As Directed. 24 tablet 0    Symbicort 160-4.5 MCG/ACT inhaler       Ventolin  (90 Base) MCG/ACT inhaler       vitamin D (ERGOCALCIFEROL) 1.25 MG (60113 UT) capsule capsule TAKE ONE CAPSULE BY MOUTH ONCE WEEKLY 4 capsule 0    [DISCONTINUED] amLODIPine (NORVASC) 10 MG tablet Take 1 tablet by mouth Daily. 90 tablet 1    [DISCONTINUED] atorvastatin (Lipitor) 10 MG tablet Take 1 tablet by mouth Daily. 90 tablet 1    [DISCONTINUED] lisinopril (PRINIVIL,ZESTRIL) 10 MG tablet Take 1 tablet by mouth Daily. 90 tablet 1    [DISCONTINUED] pantoprazole (PROTONIX) 40 MG EC tablet TAKE ONE TABLET BY MOUTH EVERY MORNING BEFORE BREAKFAST 90 tablet 0     No Known Allergies        Objective   Physical Exam   Constitutional: He is oriented to person, place, and time. He appears well-developed and well-nourished. No distress.   HENT:   Head: Normocephalic and atraumatic.   Right Ear: External ear normal.   Left Ear: External ear normal.   Mouth/Throat: Oropharynx is clear and moist.   Eyes: Right eye exhibits no discharge. Left eye exhibits no discharge. No scleral  icterus.   Neck: Neck normal appearance.  Pulmonary/Chest: Effort normal. No accessory muscle usage.  No respiratory distress.No use of oxygen by nasal cannula  Abdominal: Abdomen appears normal.   Neurological: He is alert and oriented to person, place, and time.   Skin: Skin is dry. He is not diaphoretic. No erythema. No pallor.   Psychiatric: He has a normal mood and affect. His speech is normal and behavior is normal. Judgment normal. He is attentive.     Hemoglobin A1c (05/16/2023 09:39)  Lipid Panel (05/16/2023 09:39)  Comprehensive Metabolic Panel (05/16/2023 09:39)  CBC (No Diff) (05/16/2023 09:39)    There were no vitals filed for this visit.  There is no height or weight on file to calculate BMI.        Assessment & Plan   Diagnoses and all orders for this visit:    1. Essential hypertension (Primary)  -     lisinopril (PRINIVIL,ZESTRIL) 10 MG tablet; Take 1 tablet by mouth Daily.  Dispense: 90 tablet; Refill: 1  -     amLODIPine (NORVASC) 10 MG tablet; Take 1 tablet by mouth Daily.  Dispense: 90 tablet; Refill: 1    2. Mixed hyperlipidemia  -     atorvastatin (Lipitor) 10 MG tablet; Take 1 tablet by mouth Daily.  Dispense: 90 tablet; Refill: 1    3. Prediabetes  -     POC Glycosylated Hemoglobin (Hb A1C)    4. Chronic GERD  -     pantoprazole (PROTONIX) 40 MG EC tablet; Take 1 tablet by mouth Every Morning Before Breakfast.  Dispense: 90 tablet; Refill: 1    5. Flu vaccine need  -     Fluzone >6 Months (3803-1414)      1. Hypertension  - Well-controlled, per home reading reports. He is at the office now and will have his blood pressure evaluated while in the office. Will adjust medications if blood pressure is abnormal. Otherwise, he will continue on lisinopril and amlodipine.     2. Hyperlipidemia  - Improving at last evaluation. Will plan to re-evaluate lipid panel in the next 3 months. He will continue atorvastatin 10 mg and a low-fat, low-cholesterol diet.     3. Prediabetes  - Will evaluate  hemoglobin A1c. Encouraged him to follow the ADA diet and continue to decrease sugars in the diet as he reports he has been doing. Will consider metformin if there is a need to add to his medication.     4. Chronic GERD  - Well-controlled with pantoprazole, plan to continue this for now. May discontinue at follow-up and consider switching to Pepcid as needed.     5. Need for influenza vaccine  - Will update his influenza vaccine today while he is at the office as well.     The patient will follow up in 3 months.            Return in about 3 months (around 12/19/2023) for chronic condition follow up.    I have reviewed the limitations of a telehealth visit (such as lack of a physical exam and unable to obtain vital signs) and advised the patient that they may need to follow up for an office visit should their symptoms or concerns persist, worsen, or change.  Patient was encouraged to keep me informed of any acute changes, lack of improvement, or any new concerning symptoms.   I discussed my findings,recommendations, and plan of care was with the patient. They verbalized understanding and agreement.    Transcribed from ambient dictation for KIMBERLEE Sutherland by Sania Elder.  09/19/23   15:12 EDT    Patient or patient representative verbalized consent to the visit recording.  I have personally performed the services described in this document as transcribed by the above individual, and it is both accurate and complete.  KIMBERLEE Sutherland  9/19/2023  17:50 EDT

## 2024-03-17 DIAGNOSIS — K21.9 CHRONIC GERD: ICD-10-CM

## 2024-03-18 NOTE — TELEPHONE ENCOUNTER
Pt due for an appointment, LVM with office number provided for pt to return call and schedule apt.

## 2024-03-19 NOTE — TELEPHONE ENCOUNTER
2nd attempt.   Pt due for an appointment, LVM with office number provided for pt to return call and schedule apt.

## 2024-03-21 NOTE — TELEPHONE ENCOUNTER
3rd attempt. Pt due for an appointment, LVM with office number provided for pt to return call and schedule apt.

## 2024-03-22 RX ORDER — PANTOPRAZOLE SODIUM 40 MG/1
40 TABLET, DELAYED RELEASE ORAL
Qty: 90 TABLET | Refills: 1 | Status: SHIPPED | OUTPATIENT
Start: 2024-03-22 | End: 2024-03-25 | Stop reason: SDUPTHER

## 2024-03-22 NOTE — TELEPHONE ENCOUNTER
Rx Refill Note  Requested Prescriptions     Pending Prescriptions Disp Refills    pantoprazole (PROTONIX) 40 MG EC tablet [Pharmacy Med Name: PANTOPRAZOLE SOD DR 40 MG TAB] 90 tablet 1     Sig: TAKE ONE TABLET BY MOUTH EVERY MORNING BEFORE BREAKFAST      Last office visit with prescribing clinician: 5/16/2023   Last telemedicine visit with prescribing clinician: 9/19/2023   Next office visit with prescribing clinician: 3/25/2024     Veronique Marin MA  03/22/24, 13:33 EDT

## 2024-03-25 ENCOUNTER — OFFICE VISIT (OUTPATIENT)
Dept: INTERNAL MEDICINE | Facility: CLINIC | Age: 54
End: 2024-03-25
Payer: COMMERCIAL

## 2024-03-25 ENCOUNTER — LAB (OUTPATIENT)
Dept: INTERNAL MEDICINE | Facility: CLINIC | Age: 54
End: 2024-03-25
Payer: COMMERCIAL

## 2024-03-25 VITALS
RESPIRATION RATE: 20 BRPM | BODY MASS INDEX: 34.44 KG/M2 | SYSTOLIC BLOOD PRESSURE: 142 MMHG | HEART RATE: 102 BPM | WEIGHT: 219.4 LBS | HEIGHT: 67 IN | DIASTOLIC BLOOD PRESSURE: 98 MMHG | OXYGEN SATURATION: 95 % | TEMPERATURE: 97.8 F

## 2024-03-25 DIAGNOSIS — R73.03 PREDIABETES: ICD-10-CM

## 2024-03-25 DIAGNOSIS — E55.9 VITAMIN D DEFICIENCY: ICD-10-CM

## 2024-03-25 DIAGNOSIS — Z79.899 LONG-TERM CURRENT USE OF PROTON PUMP INHIBITOR THERAPY: ICD-10-CM

## 2024-03-25 DIAGNOSIS — Z23 NEED FOR PNEUMOCOCCAL VACCINE: ICD-10-CM

## 2024-03-25 DIAGNOSIS — I10 ESSENTIAL HYPERTENSION: ICD-10-CM

## 2024-03-25 DIAGNOSIS — Z23 NEED FOR SHINGLES VACCINE: ICD-10-CM

## 2024-03-25 DIAGNOSIS — R53.83 FATIGUE, UNSPECIFIED TYPE: ICD-10-CM

## 2024-03-25 DIAGNOSIS — E78.2 MIXED HYPERLIPIDEMIA: ICD-10-CM

## 2024-03-25 DIAGNOSIS — K21.9 CHRONIC GERD: ICD-10-CM

## 2024-03-25 DIAGNOSIS — I10 ESSENTIAL HYPERTENSION: Primary | ICD-10-CM

## 2024-03-25 LAB
25(OH)D3 SERPL-MCNC: 18.4 NG/ML (ref 30–100)
ALBUMIN SERPL-MCNC: 4.3 G/DL (ref 3.5–5.2)
ALBUMIN/GLOB SERPL: 1.4 G/DL
ALP SERPL-CCNC: 124 U/L (ref 39–117)
ALT SERPL W P-5'-P-CCNC: 28 U/L (ref 1–41)
ANION GAP SERPL CALCULATED.3IONS-SCNC: 12.6 MMOL/L (ref 5–15)
AST SERPL-CCNC: 16 U/L (ref 1–40)
BILIRUB SERPL-MCNC: 0.2 MG/DL (ref 0–1.2)
BUN SERPL-MCNC: 18 MG/DL (ref 6–20)
BUN/CREAT SERPL: 17.1 (ref 7–25)
CALCIUM SPEC-SCNC: 9.2 MG/DL (ref 8.6–10.5)
CHLORIDE SERPL-SCNC: 101 MMOL/L (ref 98–107)
CHOLEST SERPL-MCNC: 219 MG/DL (ref 0–200)
CO2 SERPL-SCNC: 22.4 MMOL/L (ref 22–29)
CREAT SERPL-MCNC: 1.05 MG/DL (ref 0.76–1.27)
DEPRECATED RDW RBC AUTO: 42.8 FL (ref 37–54)
EGFRCR SERPLBLD CKD-EPI 2021: 85.4 ML/MIN/1.73
ERYTHROCYTE [DISTWIDTH] IN BLOOD BY AUTOMATED COUNT: 13.3 % (ref 12.3–15.4)
EXPIRATION DATE: ABNORMAL
GLOBULIN UR ELPH-MCNC: 3 GM/DL
GLUCOSE SERPL-MCNC: 201 MG/DL (ref 65–99)
HBA1C MFR BLD: 6.2 % (ref 4.5–5.7)
HCT VFR BLD AUTO: 44.1 % (ref 37.5–51)
HDLC SERPL-MCNC: 33 MG/DL (ref 40–60)
HGB BLD-MCNC: 14.7 G/DL (ref 13–17.7)
LDLC SERPL CALC-MCNC: 98 MG/DL (ref 0–100)
LDLC/HDLC SERPL: 2.48 {RATIO}
Lab: ABNORMAL
MAGNESIUM SERPL-MCNC: 2.2 MG/DL (ref 1.6–2.6)
MCH RBC QN AUTO: 29.1 PG (ref 26.6–33)
MCHC RBC AUTO-ENTMCNC: 33.3 G/DL (ref 31.5–35.7)
MCV RBC AUTO: 87.2 FL (ref 79–97)
PLATELET # BLD AUTO: 299 10*3/MM3 (ref 140–450)
PMV BLD AUTO: 11 FL (ref 6–12)
POTASSIUM SERPL-SCNC: 4.4 MMOL/L (ref 3.5–5.2)
PROT SERPL-MCNC: 7.3 G/DL (ref 6–8.5)
RBC # BLD AUTO: 5.06 10*6/MM3 (ref 4.14–5.8)
SODIUM SERPL-SCNC: 136 MMOL/L (ref 136–145)
TRIGL SERPL-MCNC: 520 MG/DL (ref 0–150)
TSH SERPL DL<=0.05 MIU/L-ACNC: 1.11 UIU/ML (ref 0.27–4.2)
VIT B12 BLD-MCNC: 602 PG/ML (ref 211–946)
VLDLC SERPL-MCNC: 88 MG/DL (ref 5–40)
WBC NRBC COR # BLD AUTO: 10.91 10*3/MM3 (ref 3.4–10.8)

## 2024-03-25 PROCEDURE — 80053 COMPREHEN METABOLIC PANEL: CPT | Performed by: NURSE PRACTITIONER

## 2024-03-25 PROCEDURE — 82306 VITAMIN D 25 HYDROXY: CPT | Performed by: NURSE PRACTITIONER

## 2024-03-25 PROCEDURE — 84443 ASSAY THYROID STIM HORMONE: CPT | Performed by: NURSE PRACTITIONER

## 2024-03-25 PROCEDURE — 85027 COMPLETE CBC AUTOMATED: CPT | Performed by: NURSE PRACTITIONER

## 2024-03-25 PROCEDURE — 82607 VITAMIN B-12: CPT | Performed by: NURSE PRACTITIONER

## 2024-03-25 PROCEDURE — 83735 ASSAY OF MAGNESIUM: CPT | Performed by: NURSE PRACTITIONER

## 2024-03-25 PROCEDURE — 80061 LIPID PANEL: CPT | Performed by: NURSE PRACTITIONER

## 2024-03-25 PROCEDURE — 36415 COLL VENOUS BLD VENIPUNCTURE: CPT | Performed by: NURSE PRACTITIONER

## 2024-03-25 RX ORDER — AZELASTINE HYDROCHLORIDE 137 UG/1
SPRAY, METERED NASAL
COMMUNITY
Start: 2024-03-21

## 2024-03-25 RX ORDER — PANTOPRAZOLE SODIUM 40 MG/1
40 TABLET, DELAYED RELEASE ORAL
Qty: 90 TABLET | Refills: 1 | Status: SHIPPED | OUTPATIENT
Start: 2024-03-25

## 2024-03-25 RX ORDER — ATORVASTATIN CALCIUM 10 MG/1
10 TABLET, FILM COATED ORAL DAILY
Qty: 90 TABLET | Refills: 1 | Status: SHIPPED | OUTPATIENT
Start: 2024-03-25

## 2024-03-25 RX ORDER — AMLODIPINE BESYLATE 10 MG/1
10 TABLET ORAL DAILY
Qty: 90 TABLET | Refills: 1 | Status: SHIPPED | OUTPATIENT
Start: 2024-03-25

## 2024-03-25 RX ORDER — LISINOPRIL 10 MG/1
10 TABLET ORAL DAILY
Qty: 90 TABLET | Refills: 1 | Status: SHIPPED | OUTPATIENT
Start: 2024-03-25

## 2024-03-25 NOTE — PROGRESS NOTES
"Subjective   Demetria Johnson is a 53 y.o. male.     Chief Complaint   Patient presents with    Med Refill     Patient is here for medication refills, no questions or concerns.     Hypertension    Hyperlipidemia    Prediabetes    Heartburn       PCP: Samantha Saravia APRN    History of Present Illness   The patient is a 53-year-old male who is here for chronic condition follow-up and refill of medications with no new concerns today. He has hypertension, hyperlipidemia, prediabetes, and chronic GERD. Last hemoglobin A1c 5/19/2023 was 6 percent, which is improved and down from 6.4 percent previously. Last lipid panel 5/16/2023 with elevation in LDL at 105, but improved compared to 1 year ago. CMP and CBC 5/16/2023 unremarkable aside from mild elevation in glucose.    He states he needs more vitamin D \"because sometimes I feel I want to sleep\". He is not taking any over-the-counter vitamin D daily. He reports he sleeps good, but he occasionally feels fatigued.     He monitors his blood pressure at home occasionally. His systolic blood pressure ranges from 125 to 130 mmHg. He denies any unusual headaches, dizziness, chest pain, or palpitations.     He notes having \"asthma allergy\" and is on an inhaler. Follows with [facility]    He eats home cooked meals. He does not eat much sweets. He eats rice and bread.    He takes pantoprazole. He had an EGD in 2020 at . He notes he occasionally feels well when he does not take the pantoprazole; although when he does experience symptoms and takes the medication it helps. He has symptoms every week or more depending on what he eats. He stays away from spicy foods. He does not want to repeat EGD unless his symptoms worsen.      He does not drink alcohol. He drinks a little bit of coffee. He has never smoked.    He is on atorvastatin. He takes lisinopril and amlodipine.    He is not interested in COVID-19 booster. He is interested in pneumonia and " shingles vaccine. He has had 1 dose of the shingles vaccine in 05/2023.      CBC (No Diff) (05/16/2023 09:39)  Comprehensive Metabolic Panel (05/16/2023 09:39)  Lipid Panel (05/16/2023 09:39)  Hemoglobin A1c (05/16/2023 09:39)  Helicobacter Pylori, IgA IgG IgM (05/16/2023 09:39)  Vitamin D,25-Hydroxy (05/16/2023 09:39)  POC Glycosylated Hemoglobin (Hb A1C) (09/19/2023 15:01)    The following portions of the patient's history were reviewed and updated as appropriate: allergies, current medications, past family history, past medical history, past social history, past surgical history and problem list.        Review of Systems   Constitutional:  Positive for fatigue. Negative for fever and unexpected weight loss.   HENT:          Allergies   Eyes:  Negative for blurred vision, double vision and visual disturbance.   Respiratory:  Negative for cough, shortness of breath and wheezing.    Cardiovascular:  Negative for chest pain, palpitations and leg swelling.   Gastrointestinal:  Positive for GERD. Negative for abdominal pain, constipation, diarrhea, nausea and vomiting.   Genitourinary:  Negative for difficulty urinating, frequency and urgency.   Musculoskeletal:  Negative for arthralgias and myalgias.   Skin:  Negative for color change and rash.   Neurological:  Negative for dizziness, weakness and headache.   Hematological:  Negative for adenopathy. Does not bruise/bleed easily.           Outpatient Medications Marked as Taking for the 3/25/24 encounter (Office Visit) with Samantha Saravia APRN   Medication Sig Dispense Refill    amLODIPine (NORVASC) 10 MG tablet Take 1 tablet by mouth Daily. 90 tablet 1    aspirin (aspirin) 81 MG EC tablet Take 1 tablet by mouth Daily. 90 tablet 1    atorvastatin (Lipitor) 10 MG tablet Take 1 tablet by mouth Daily. 90 tablet 1    Azelastine HCl 137 MCG/SPRAY solution       Dupixent 300 MG/2ML solution prefilled syringe       Dymista 137-50 MCG/ACT suspension       fluorometholone  (FML) 0.1 % ophthalmic suspension       lisinopril (PRINIVIL,ZESTRIL) 10 MG tablet Take 1 tablet by mouth Daily. 90 tablet 1    loratadine (CLARITIN) 10 MG tablet Take 1 tablet by mouth Daily.      nystatin-triamcinolone (MYCOLOG) 607966-0.1 UNIT/GM-% ointment Apply  topically to the appropriate area as directed 2 (Two) Times a Day. 60 g 1    pantoprazole (PROTONIX) 40 MG EC tablet Take 1 tablet by mouth Every Morning Before Breakfast. 90 tablet 1    Restasis 0.05 % ophthalmic emulsion       Symbicort 160-4.5 MCG/ACT inhaler       Ventolin  (90 Base) MCG/ACT inhaler       vitamin D (ERGOCALCIFEROL) 1.25 MG (68929 UT) capsule capsule TAKE ONE CAPSULE BY MOUTH ONCE WEEKLY 4 capsule 0    [DISCONTINUED] amLODIPine (NORVASC) 10 MG tablet Take 1 tablet by mouth Daily. 90 tablet 1    [DISCONTINUED] atorvastatin (Lipitor) 10 MG tablet Take 1 tablet by mouth Daily. 90 tablet 1    [DISCONTINUED] lisinopril (PRINIVIL,ZESTRIL) 10 MG tablet Take 1 tablet by mouth Daily. 90 tablet 1    [DISCONTINUED] pantoprazole (PROTONIX) 40 MG EC tablet TAKE ONE TABLET BY MOUTH EVERY MORNING BEFORE BREAKFAST 90 tablet 1     No Known Allergies        Objective   Physical Exam  Vitals and nursing note reviewed.   Constitutional:       Appearance: Normal appearance. He is well-developed. He is obese.   HENT:      Head: Normocephalic and atraumatic.   Eyes:      Conjunctiva/sclera: Conjunctivae normal.   Cardiovascular:      Rate and Rhythm: Normal rate and regular rhythm.      Heart sounds: Normal heart sounds.   Pulmonary:      Effort: Pulmonary effort is normal. No respiratory distress.      Breath sounds: Normal breath sounds.   Abdominal:      General: Bowel sounds are normal. There is no distension.      Palpations: Abdomen is soft.      Tenderness: There is no abdominal tenderness.   Musculoskeletal:      Cervical back: Normal range of motion.   Skin:     General: Skin is warm and dry.   Neurological:      Mental Status: He is alert  "and oriented to person, place, and time.   Psychiatric:         Speech: Speech normal.         Behavior: Behavior normal.         Thought Content: Thought content normal.         Judgment: Judgment normal.         Vitals:    03/25/24 0801   BP: 142/98   Pulse: 102   Resp: 20   Temp: 97.8 °F (36.6 °C)   TempSrc: Temporal   SpO2: 95%   Weight: 99.5 kg (219 lb 6.4 oz)   Height: 170.3 cm (67.05\")     Body mass index is 34.31 kg/m².  Wt Readings from Last 3 Encounters:   03/25/24 99.5 kg (219 lb 6.4 oz)   05/16/23 99 kg (218 lb 3.2 oz)   11/16/22 103 kg (226 lb 9.6 oz)             Assessment & Plan   Diagnoses and all orders for this visit:    1. Essential hypertension (Primary)  -     CBC (No Diff); Future  -     Comprehensive Metabolic Panel; Future  -     Lipid Panel; Future  -     lisinopril (PRINIVIL,ZESTRIL) 10 MG tablet; Take 1 tablet by mouth Daily.  Dispense: 90 tablet; Refill: 1  -     amLODIPine (NORVASC) 10 MG tablet; Take 1 tablet by mouth Daily.  Dispense: 90 tablet; Refill: 1    2. Mixed hyperlipidemia  -     CBC (No Diff); Future  -     Comprehensive Metabolic Panel; Future  -     Lipid Panel; Future  -     atorvastatin (Lipitor) 10 MG tablet; Take 1 tablet by mouth Daily.  Dispense: 90 tablet; Refill: 1    3. Prediabetes  -     CBC (No Diff); Future  -     Comprehensive Metabolic Panel; Future  -     Lipid Panel; Future  -     POC Glycosylated Hemoglobin (Hb A1C)    4. Chronic GERD  -     CBC (No Diff); Future  -     Comprehensive Metabolic Panel; Future  -     Lipid Panel; Future  -     pantoprazole (PROTONIX) 40 MG EC tablet; Take 1 tablet by mouth Every Morning Before Breakfast.  Dispense: 90 tablet; Refill: 1    5. Long-term current use of proton pump inhibitor therapy  -     Comprehensive Metabolic Panel; Future  -     Vitamin B12; Future  -     Magnesium; Future    6. Vitamin D deficiency  -     Vitamin D,25-Hydroxy; Future  -     TSH Rfx On Abnormal To Free T4; Future    7. Fatigue, unspecified " type  -     TSH Rfx On Abnormal To Free T4; Future    8. Need for shingles vaccine  -     Shingrix Vaccine    9. Need for pneumococcal vaccine  -     Pneumococcal Conjugate Vaccine 20-Valent (PCV20)    Prediabetes.  He will continue to decrease carbs and sugars, increase physical activity.    Hypertension.  Slightly elevated in office today, but home blood pressure readings are alright. He will let me know if not meeting goal of less than 130/80 mmHg.    Hyperlipidemia.  I will recheck lipids. He will continue statin.    GERD.  He still has breakthrough symptoms if he eats trigger foods or forgets to take the medication. I did review his EGD from 2020. He declines another EGD at this point. We will tighten up on GERD precautions. He will continue PPI therapy and check some additional labs.    Vitamin D deficiency.  Low about a year ago, not currently on any supplementation. He feels fatigued like he did before. I will recheck this level.    Fatigue.  Will check labs.    Preventative care.  I will update Shingrix and pneumonia vaccination. He declined COVID-19 vaccine today.      Return in about 6 months (around 9/25/2024) for Annual, and need to collect labs today.    I discussed my findings,recommendations, and plan of care was with the patient. They verbalized understanding and agreement.  Patient was encouraged to keep me informed of any acute changes, lack of improvement, or any new concerning symptoms.     Transcribed from ambient dictation for KIMBERLEE Sutherland by Butch Miller.  03/25/24   09:21 EDT    Patient or patient representative verbalized consent to the visit recording.  I have personally performed the services described in this document as transcribed by the above individual, and it is both accurate and complete.  KIMBERLEE Sutherland  3/25/2024  13:05 EDT

## 2024-03-25 NOTE — LETTER
[facility]  Vaccine Consent Form    Patient Name:  Demetria Johnson  Patient :  1970     Vaccine(s) Ordered    Pneumococcal Conjugate Vaccine 20-Valent (PCV20)  Shingrix Vaccine        Screening Checklist  The following questions should be completed prior to vaccination. If you answer “yes” to any question, it does not necessarily mean you should not be vaccinated. It just means we may need to clarify or ask more questions. If a question is unclear, please ask your healthcare provider to explain it.    Yes No   Any fever or moderate to severe illness today (mild illness and/or antibiotic treatment are not contraindications)?     Do you have a history of a serious reaction to any previous vaccinations, such as anaphylaxis, encephalopathy within 7 days, Guillain-Gilman syndrome within 6 weeks, seizure?     Have you received any live vaccine(s) (e.g MMR, SERENA) or any other vaccines in the last month (to ensure duplicate doses aren't given)?     Do you have an anaphylactic allergy to latex (DTaP, DTaP-IPV, Hep A, Hep B, MenB, RV, Td, Tdap), baker’s yeast (Hep B, HPV), polysorbates (RSV, nirsevimab, PCV 20, Rotavirrus, Tdap, Shingrix), or gelatin (SERENA, MMR)?     Do you have an anaphylactic allergy to neomycin (Rabies, SERENA, MMR, IPV, Hep A), polymyxin B (IPV), or streptomycin (IPV)?      Any cancer, leukemia, AIDS, or other immune system disorder? (SERENA, MMR, RV)     Do you have a parent, brother, or sister with an immune system problem (if immune competence of vaccine recipient clinically verified, can proceed)? (MMR, SERENA)     Any recent steroid treatments for >2 weeks, chemotherapy, or radiation treatment? (SERENA, MMR)     Have you received antibody-containing blood transfusions or IVIG in the past 11 months (recommended interval is dependent on product)? (MMR, SERENA)     Have you taken antiviral drugs (acyclovir, famciclovir, valacyclovir for SERENA) in the last 24 or 48 hours, respectively?      Are you  "pregnant or planning to become pregnant within 1 month? (SERENA, MMR, HPV, IPV, MenB, Abrexvy; For Hep B- refer to Engerix-B; For RSV - Abrysvo is indicated for 32-36 weeks of pregnancy from September to January)     For infants, have you ever been told your child has had intussusception or a medical emergency involving obstruction of the intestine (Rotavirus)? If not for an infant, can skip this question.         *Ordering Physicians/APC should be consulted if \"yes\" is checked by the patient or guardian above.  I have received, read, and understand the Vaccine Information Statement (VIS) for each vaccine ordered.  I have considered my or my child's health status as well as the health status of my close contacts.  I have taken the opportunity to discuss my vaccine questions with my or my child's health care provider.   I have requested that the ordered vaccine(s) be given to me or my child.  I understand the benefits and risks of the vaccines.  I understand that I should remain in the clinic for 15 minutes after receiving the vaccine(s).  _________________________________________________________  Signature of Patient or Parent/Legal Guardian ____________________  Date     "

## 2024-04-03 DIAGNOSIS — E78.2 MIXED HYPERLIPIDEMIA: ICD-10-CM

## 2024-04-03 DIAGNOSIS — E55.9 VITAMIN D DEFICIENCY: Primary | ICD-10-CM

## 2024-04-03 DIAGNOSIS — E78.1 HYPERTRIGLYCERIDEMIA: ICD-10-CM

## 2024-04-03 RX ORDER — FENOFIBRATE 145 MG/1
145 TABLET, COATED ORAL DAILY
Qty: 90 TABLET | Refills: 1 | Status: SHIPPED | OUTPATIENT
Start: 2024-04-03

## 2024-04-03 RX ORDER — ATORVASTATIN CALCIUM 40 MG/1
40 TABLET, FILM COATED ORAL DAILY
Qty: 90 TABLET | Refills: 1 | Status: SHIPPED | OUTPATIENT
Start: 2024-04-03

## 2024-06-22 ENCOUNTER — APPOINTMENT (OUTPATIENT)
Dept: CT IMAGING | Facility: HOSPITAL | Age: 54
End: 2024-06-22
Payer: COMMERCIAL

## 2024-06-22 ENCOUNTER — HOSPITAL ENCOUNTER (OUTPATIENT)
Facility: HOSPITAL | Age: 54
Setting detail: OBSERVATION
Discharge: HOME | End: 2024-06-23
Attending: EMERGENCY MEDICINE | Admitting: INTERNAL MEDICINE
Payer: COMMERCIAL

## 2024-06-22 ENCOUNTER — APPOINTMENT (OUTPATIENT)
Dept: MRI IMAGING | Facility: HOSPITAL | Age: 54
End: 2024-06-22
Payer: COMMERCIAL

## 2024-06-22 ENCOUNTER — APPOINTMENT (OUTPATIENT)
Dept: GENERAL RADIOLOGY | Facility: HOSPITAL | Age: 54
End: 2024-06-22
Payer: COMMERCIAL

## 2024-06-22 ENCOUNTER — APPOINTMENT (OUTPATIENT)
Dept: CARDIOLOGY | Facility: HOSPITAL | Age: 54
End: 2024-06-22
Payer: COMMERCIAL

## 2024-06-22 DIAGNOSIS — R20.0 LEFT SIDED NUMBNESS: ICD-10-CM

## 2024-06-22 DIAGNOSIS — I10 HYPERTENSION, UNSPECIFIED TYPE: ICD-10-CM

## 2024-06-22 DIAGNOSIS — I63.9 CEREBROVASCULAR ACCIDENT (CVA), UNSPECIFIED MECHANISM: Primary | ICD-10-CM

## 2024-06-22 PROBLEM — R53.1 LEFT-SIDED WEAKNESS: Status: ACTIVE | Noted: 2024-06-22

## 2024-06-22 LAB
ABO GROUP BLD: NORMAL
ABO GROUP BLD: NORMAL
ALBUMIN SERPL-MCNC: 3.9 G/DL (ref 3.5–5.2)
ALBUMIN/GLOB SERPL: 1.1 G/DL
ALP SERPL-CCNC: 120 U/L (ref 39–117)
ALT SERPL W P-5'-P-CCNC: 19 U/L (ref 1–41)
ANION GAP SERPL CALCULATED.3IONS-SCNC: 8 MMOL/L (ref 5–15)
ASCENDING AORTA: 3.7 CM
AST SERPL-CCNC: 18 U/L (ref 1–40)
BASOPHILS # BLD AUTO: 0.07 10*3/MM3 (ref 0–0.2)
BASOPHILS NFR BLD AUTO: 0.9 % (ref 0–1.5)
BH CV ECHO MEAS - AO MAX PG: 7.6 MMHG
BH CV ECHO MEAS - AO MEAN PG: 5 MMHG
BH CV ECHO MEAS - AO ROOT DIAM: 3.7 CM
BH CV ECHO MEAS - AO V2 MAX: 138 CM/SEC
BH CV ECHO MEAS - AO V2 VTI: 27.5 CM
BH CV ECHO MEAS - AVA(I,D): 2.6 CM2
BH CV ECHO MEAS - EDV(CUBED): 85.2 ML
BH CV ECHO MEAS - EDV(MOD-SP2): 95.3 ML
BH CV ECHO MEAS - EDV(MOD-SP4): 99.4 ML
BH CV ECHO MEAS - EF(MOD-BP): 59.4 %
BH CV ECHO MEAS - EF(MOD-SP2): 57.1 %
BH CV ECHO MEAS - EF(MOD-SP4): 61.1 %
BH CV ECHO MEAS - ESV(CUBED): 22 ML
BH CV ECHO MEAS - ESV(MOD-SP2): 40.9 ML
BH CV ECHO MEAS - ESV(MOD-SP4): 38.7 ML
BH CV ECHO MEAS - FS: 36.4 %
BH CV ECHO MEAS - IVS/LVPW: 1 CM
BH CV ECHO MEAS - IVSD: 1 CM
BH CV ECHO MEAS - LA DIMENSION: 3.9 CM
BH CV ECHO MEAS - LAT PEAK E' VEL: 10 CM/SEC
BH CV ECHO MEAS - LV MASS(C)D: 147.8 GRAMS
BH CV ECHO MEAS - LV MAX PG: 3.7 MMHG
BH CV ECHO MEAS - LV MEAN PG: 2 MMHG
BH CV ECHO MEAS - LV V1 MAX: 95.9 CM/SEC
BH CV ECHO MEAS - LV V1 VTI: 20.8 CM
BH CV ECHO MEAS - LVIDD: 4.4 CM
BH CV ECHO MEAS - LVIDS: 2.8 CM
BH CV ECHO MEAS - LVOT AREA: 3.5 CM2
BH CV ECHO MEAS - LVOT DIAM: 2.1 CM
BH CV ECHO MEAS - LVPWD: 1 CM
BH CV ECHO MEAS - MED PEAK E' VEL: 7.1 CM/SEC
BH CV ECHO MEAS - MV A MAX VEL: 76.1 CM/SEC
BH CV ECHO MEAS - MV DEC SLOPE: 577.5 CM/SEC2
BH CV ECHO MEAS - MV DEC TIME: 0.17 SEC
BH CV ECHO MEAS - MV E MAX VEL: 76.1 CM/SEC
BH CV ECHO MEAS - MV E/A: 1
BH CV ECHO MEAS - MV MAX PG: 3.5 MMHG
BH CV ECHO MEAS - MV MEAN PG: 2 MMHG
BH CV ECHO MEAS - MV P1/2T: 50 MSEC
BH CV ECHO MEAS - MV V2 VTI: 18.8 CM
BH CV ECHO MEAS - MVA(P1/2T): 4.4 CM2
BH CV ECHO MEAS - MVA(VTI): 3.8 CM2
BH CV ECHO MEAS - PA ACC TIME: 0.1 SEC
BH CV ECHO MEAS - SV(LVOT): 72 ML
BH CV ECHO MEAS - SV(MOD-SP2): 54.4 ML
BH CV ECHO MEAS - SV(MOD-SP4): 60.7 ML
BH CV ECHO MEAS - TAPSE (>1.6): 2.5 CM
BH CV ECHO MEASUREMENTS AVERAGE E/E' RATIO: 8.9
BH CV ECHO SHUNT ASSESSMENT PERFORMED (HIDDEN SCRIPTING): 1
BH CV XLRA - RV BASE: 3.9 CM
BH CV XLRA - TDI S': 14.8 CM/SEC
BILIRUB SERPL-MCNC: 0.2 MG/DL (ref 0–1.2)
BLD GP AB SCN SERPL QL: NEGATIVE
BUN BLDA-MCNC: 22 MG/DL (ref 8–26)
BUN SERPL-MCNC: 21 MG/DL (ref 6–20)
BUN/CREAT SERPL: 19.1 (ref 7–25)
CA-I BLDA-SCNC: 1.28 MMOL/L (ref 1.2–1.32)
CALCIUM SPEC-SCNC: 9.2 MG/DL (ref 8.6–10.5)
CHLORIDE BLDA-SCNC: 101 MMOL/L (ref 98–109)
CHLORIDE SERPL-SCNC: 102 MMOL/L (ref 98–107)
CO2 BLDA-SCNC: 28 MMOL/L (ref 24–29)
CO2 SERPL-SCNC: 30 MMOL/L (ref 22–29)
CREAT BLDA-MCNC: 1.1 MG/DL (ref 0.6–1.3)
CREAT SERPL-MCNC: 1.1 MG/DL (ref 0.76–1.27)
DEPRECATED RDW RBC AUTO: 41.8 FL (ref 37–54)
EGFRCR SERPLBLD CKD-EPI 2021: 80.3 ML/MIN/1.73
EGFRCR SERPLBLD CKD-EPI 2021: 80.3 ML/MIN/1.73
EOSINOPHIL # BLD AUTO: 0.43 10*3/MM3 (ref 0–0.4)
EOSINOPHIL NFR BLD AUTO: 5.3 % (ref 0.3–6.2)
ERYTHROCYTE [DISTWIDTH] IN BLOOD BY AUTOMATED COUNT: 13 % (ref 12.3–15.4)
GLOBULIN UR ELPH-MCNC: 3.5 GM/DL
GLUCOSE BLDC GLUCOMTR-MCNC: 108 MG/DL (ref 70–130)
GLUCOSE BLDC GLUCOMTR-MCNC: 109 MG/DL (ref 70–130)
GLUCOSE BLDC GLUCOMTR-MCNC: 115 MG/DL (ref 70–130)
GLUCOSE SERPL-MCNC: 110 MG/DL (ref 65–99)
HCT VFR BLD AUTO: 43.4 % (ref 37.5–51)
HCT VFR BLDA CALC: 44 % (ref 38–51)
HGB BLD-MCNC: 14.3 G/DL (ref 13–17.7)
HGB BLDA-MCNC: 15 G/DL (ref 12–17)
HOLD SPECIMEN: NORMAL
IMM GRANULOCYTES # BLD AUTO: 0.03 10*3/MM3 (ref 0–0.05)
IMM GRANULOCYTES NFR BLD AUTO: 0.4 % (ref 0–0.5)
INR PPP: 0.95 (ref 0.89–1.12)
INR PPP: 1.1 (ref 0.8–1.2)
LEFT ATRIUM VOLUME INDEX: 27.4 ML/M2
LYMPHOCYTES # BLD AUTO: 3.68 10*3/MM3 (ref 0.7–3.1)
LYMPHOCYTES NFR BLD AUTO: 45.4 % (ref 19.6–45.3)
MCH RBC QN AUTO: 29.1 PG (ref 26.6–33)
MCHC RBC AUTO-ENTMCNC: 32.9 G/DL (ref 31.5–35.7)
MCV RBC AUTO: 88.2 FL (ref 79–97)
MONOCYTES # BLD AUTO: 0.62 10*3/MM3 (ref 0.1–0.9)
MONOCYTES NFR BLD AUTO: 7.7 % (ref 5–12)
NEUTROPHILS NFR BLD AUTO: 3.27 10*3/MM3 (ref 1.7–7)
NEUTROPHILS NFR BLD AUTO: 40.3 % (ref 42.7–76)
NRBC BLD AUTO-RTO: 0 /100 WBC (ref 0–0.2)
PLATELET # BLD AUTO: 230 10*3/MM3 (ref 140–450)
PMV BLD AUTO: 10.5 FL (ref 6–12)
POTASSIUM BLDA-SCNC: 3.8 MMOL/L (ref 3.5–4.9)
POTASSIUM SERPL-SCNC: 3.8 MMOL/L (ref 3.5–5.2)
PROT SERPL-MCNC: 7.4 G/DL (ref 6–8.5)
PROTHROMBIN TIME: 12.7 SECONDS (ref 12.2–14.5)
PROTHROMBIN TIME: 13.2 SECONDS (ref 12.8–15.2)
QT INTERVAL: 350 MS
QTC INTERVAL: 442 MS
RBC # BLD AUTO: 4.92 10*6/MM3 (ref 4.14–5.8)
RH BLD: POSITIVE
RH BLD: POSITIVE
SODIUM BLD-SCNC: 140 MMOL/L (ref 138–146)
SODIUM SERPL-SCNC: 140 MMOL/L (ref 136–145)
T&S EXPIRATION DATE: NORMAL
WBC NRBC COR # BLD AUTO: 8.1 10*3/MM3 (ref 3.4–10.8)
WHOLE BLOOD HOLD COAG: NORMAL
WHOLE BLOOD HOLD SPECIMEN: NORMAL

## 2024-06-22 PROCEDURE — 80047 BASIC METABLC PNL IONIZED CA: CPT

## 2024-06-22 PROCEDURE — 70450 CT HEAD/BRAIN W/O DYE: CPT

## 2024-06-22 PROCEDURE — 0042T HC CT CEREBRAL PERFUSION W/WO CONTRAST: CPT

## 2024-06-22 PROCEDURE — 86901 BLOOD TYPING SEROLOGIC RH(D): CPT | Performed by: EMERGENCY MEDICINE

## 2024-06-22 PROCEDURE — G0378 HOSPITAL OBSERVATION PER HR: HCPCS

## 2024-06-22 PROCEDURE — 99204 OFFICE O/P NEW MOD 45 MIN: CPT

## 2024-06-22 PROCEDURE — 70496 CT ANGIOGRAPHY HEAD: CPT

## 2024-06-22 PROCEDURE — 99223 1ST HOSP IP/OBS HIGH 75: CPT | Performed by: HOSPITALIST

## 2024-06-22 PROCEDURE — 86900 BLOOD TYPING SEROLOGIC ABO: CPT | Performed by: EMERGENCY MEDICINE

## 2024-06-22 PROCEDURE — 94640 AIRWAY INHALATION TREATMENT: CPT

## 2024-06-22 PROCEDURE — 85610 PROTHROMBIN TIME: CPT

## 2024-06-22 PROCEDURE — 80053 COMPREHEN METABOLIC PANEL: CPT | Performed by: EMERGENCY MEDICINE

## 2024-06-22 PROCEDURE — 99291 CRITICAL CARE FIRST HOUR: CPT

## 2024-06-22 PROCEDURE — 92523 SPEECH SOUND LANG COMPREHEN: CPT

## 2024-06-22 PROCEDURE — 82948 REAGENT STRIP/BLOOD GLUCOSE: CPT

## 2024-06-22 PROCEDURE — 93005 ELECTROCARDIOGRAM TRACING: CPT | Performed by: EMERGENCY MEDICINE

## 2024-06-22 PROCEDURE — 70551 MRI BRAIN STEM W/O DYE: CPT

## 2024-06-22 PROCEDURE — 86901 BLOOD TYPING SEROLOGIC RH(D): CPT

## 2024-06-22 PROCEDURE — 85610 PROTHROMBIN TIME: CPT | Performed by: EMERGENCY MEDICINE

## 2024-06-22 PROCEDURE — 97165 OT EVAL LOW COMPLEX 30 MIN: CPT

## 2024-06-22 PROCEDURE — 71045 X-RAY EXAM CHEST 1 VIEW: CPT

## 2024-06-22 PROCEDURE — 25510000001 IOPAMIDOL PER 1 ML: Performed by: EMERGENCY MEDICINE

## 2024-06-22 PROCEDURE — 93306 TTE W/DOPPLER COMPLETE: CPT | Performed by: INTERNAL MEDICINE

## 2024-06-22 PROCEDURE — 70498 CT ANGIOGRAPHY NECK: CPT

## 2024-06-22 PROCEDURE — 86900 BLOOD TYPING SEROLOGIC ABO: CPT

## 2024-06-22 PROCEDURE — 85025 COMPLETE CBC W/AUTO DIFF WBC: CPT | Performed by: EMERGENCY MEDICINE

## 2024-06-22 PROCEDURE — 86850 RBC ANTIBODY SCREEN: CPT | Performed by: EMERGENCY MEDICINE

## 2024-06-22 PROCEDURE — 85014 HEMATOCRIT: CPT

## 2024-06-22 PROCEDURE — 99285 EMERGENCY DEPT VISIT HI MDM: CPT

## 2024-06-22 PROCEDURE — 93306 TTE W/DOPPLER COMPLETE: CPT

## 2024-06-22 RX ORDER — ASPIRIN 81 MG/1
81 TABLET, CHEWABLE ORAL DAILY
Status: DISCONTINUED | OUTPATIENT
Start: 2024-06-22 | End: 2024-06-23 | Stop reason: HOSPADM

## 2024-06-22 RX ORDER — CETIRIZINE HYDROCHLORIDE 10 MG/1
10 TABLET ORAL DAILY
Status: DISCONTINUED | OUTPATIENT
Start: 2024-06-22 | End: 2024-06-23 | Stop reason: HOSPADM

## 2024-06-22 RX ORDER — BISACODYL 10 MG
10 SUPPOSITORY, RECTAL RECTAL DAILY PRN
Status: DISCONTINUED | OUTPATIENT
Start: 2024-06-22 | End: 2024-06-23 | Stop reason: HOSPADM

## 2024-06-22 RX ORDER — LISINOPRIL 10 MG/1
10 TABLET ORAL DAILY
Status: DISCONTINUED | OUTPATIENT
Start: 2024-06-22 | End: 2024-06-23 | Stop reason: HOSPADM

## 2024-06-22 RX ORDER — SODIUM CHLORIDE 0.9 % (FLUSH) 0.9 %
10 SYRINGE (ML) INJECTION EVERY 12 HOURS SCHEDULED
Status: DISCONTINUED | OUTPATIENT
Start: 2024-06-22 | End: 2024-06-23 | Stop reason: HOSPADM

## 2024-06-22 RX ORDER — NYSTATIN AND TRIAMCINOLONE ACETONIDE 100000; 1 [USP'U]/G; MG/G
OINTMENT TOPICAL EVERY 12 HOURS SCHEDULED
Status: DISCONTINUED | OUTPATIENT
Start: 2024-06-22 | End: 2024-06-23 | Stop reason: HOSPADM

## 2024-06-22 RX ORDER — SODIUM CHLORIDE 0.9 % (FLUSH) 0.9 %
10 SYRINGE (ML) INJECTION AS NEEDED
Status: DISCONTINUED | OUTPATIENT
Start: 2024-06-22 | End: 2024-06-23 | Stop reason: HOSPADM

## 2024-06-22 RX ORDER — BISACODYL 5 MG/1
5 TABLET, DELAYED RELEASE ORAL DAILY PRN
Status: DISCONTINUED | OUTPATIENT
Start: 2024-06-22 | End: 2024-06-23 | Stop reason: HOSPADM

## 2024-06-22 RX ORDER — PANTOPRAZOLE SODIUM 40 MG/1
40 TABLET, DELAYED RELEASE ORAL
Status: DISCONTINUED | OUTPATIENT
Start: 2024-06-22 | End: 2024-06-23 | Stop reason: HOSPADM

## 2024-06-22 RX ORDER — SODIUM CHLORIDE 9 MG/ML
40 INJECTION, SOLUTION INTRAVENOUS AS NEEDED
Status: DISCONTINUED | OUTPATIENT
Start: 2024-06-22 | End: 2024-06-22 | Stop reason: SDUPTHER

## 2024-06-22 RX ORDER — POLYETHYLENE GLYCOL 3350 17 G/17G
17 POWDER, FOR SOLUTION ORAL DAILY PRN
Status: DISCONTINUED | OUTPATIENT
Start: 2024-06-22 | End: 2024-06-23 | Stop reason: HOSPADM

## 2024-06-22 RX ORDER — CYCLOSPORINE 0.5 MG/ML
1 EMULSION OPHTHALMIC 2 TIMES DAILY
Status: DISCONTINUED | OUTPATIENT
Start: 2024-06-22 | End: 2024-06-23 | Stop reason: HOSPADM

## 2024-06-22 RX ORDER — ATORVASTATIN CALCIUM 40 MG/1
80 TABLET, FILM COATED ORAL NIGHTLY
Status: DISCONTINUED | OUTPATIENT
Start: 2024-06-22 | End: 2024-06-23 | Stop reason: HOSPADM

## 2024-06-22 RX ORDER — SODIUM CHLORIDE 9 MG/ML
40 INJECTION, SOLUTION INTRAVENOUS AS NEEDED
Status: DISCONTINUED | OUTPATIENT
Start: 2024-06-22 | End: 2024-06-23 | Stop reason: HOSPADM

## 2024-06-22 RX ORDER — CLOPIDOGREL BISULFATE 75 MG/1
300 TABLET ORAL ONCE
Status: COMPLETED | OUTPATIENT
Start: 2024-06-22 | End: 2024-06-22

## 2024-06-22 RX ORDER — ASPIRIN 300 MG/1
300 SUPPOSITORY RECTAL DAILY
Status: DISCONTINUED | OUTPATIENT
Start: 2024-06-22 | End: 2024-06-22

## 2024-06-22 RX ORDER — ASPIRIN 300 MG/1
300 SUPPOSITORY RECTAL DAILY
Status: DISCONTINUED | OUTPATIENT
Start: 2024-06-22 | End: 2024-06-23 | Stop reason: HOSPADM

## 2024-06-22 RX ORDER — NITROGLYCERIN 0.4 MG/1
0.4 TABLET SUBLINGUAL
Status: DISCONTINUED | OUTPATIENT
Start: 2024-06-22 | End: 2024-06-23 | Stop reason: HOSPADM

## 2024-06-22 RX ORDER — BUDESONIDE AND FORMOTEROL FUMARATE DIHYDRATE 160; 4.5 UG/1; UG/1
2 AEROSOL RESPIRATORY (INHALATION)
Status: DISCONTINUED | OUTPATIENT
Start: 2024-06-22 | End: 2024-06-23 | Stop reason: HOSPADM

## 2024-06-22 RX ORDER — FENOFIBRATE 145 MG/1
145 TABLET, COATED ORAL DAILY
Status: DISCONTINUED | OUTPATIENT
Start: 2024-06-22 | End: 2024-06-23 | Stop reason: HOSPADM

## 2024-06-22 RX ORDER — ONDANSETRON 4 MG/1
4 TABLET, ORALLY DISINTEGRATING ORAL EVERY 6 HOURS PRN
Status: DISCONTINUED | OUTPATIENT
Start: 2024-06-22 | End: 2024-06-23 | Stop reason: HOSPADM

## 2024-06-22 RX ORDER — AMOXICILLIN 250 MG
2 CAPSULE ORAL 2 TIMES DAILY PRN
Status: DISCONTINUED | OUTPATIENT
Start: 2024-06-22 | End: 2024-06-23 | Stop reason: HOSPADM

## 2024-06-22 RX ORDER — ASPIRIN 81 MG/1
81 TABLET, CHEWABLE ORAL DAILY
Status: DISCONTINUED | OUTPATIENT
Start: 2024-06-22 | End: 2024-06-22

## 2024-06-22 RX ORDER — CLOPIDOGREL BISULFATE 75 MG/1
75 TABLET ORAL DAILY
Status: DISCONTINUED | OUTPATIENT
Start: 2024-06-23 | End: 2024-06-23 | Stop reason: HOSPADM

## 2024-06-22 RX ORDER — AMLODIPINE BESYLATE 10 MG/1
10 TABLET ORAL DAILY
Status: DISCONTINUED | OUTPATIENT
Start: 2024-06-22 | End: 2024-06-23 | Stop reason: HOSPADM

## 2024-06-22 RX ORDER — ONDANSETRON 2 MG/ML
4 INJECTION INTRAMUSCULAR; INTRAVENOUS EVERY 6 HOURS PRN
Status: DISCONTINUED | OUTPATIENT
Start: 2024-06-22 | End: 2024-06-23 | Stop reason: HOSPADM

## 2024-06-22 RX ADMIN — IOPAMIDOL 100 ML: 755 INJECTION, SOLUTION INTRAVENOUS at 06:51

## 2024-06-22 RX ADMIN — Medication 10 ML: at 20:20

## 2024-06-22 RX ADMIN — CYCLOSPORINE 1 DROP: 0.5 EMULSION OPHTHALMIC at 20:20

## 2024-06-22 RX ADMIN — PANTOPRAZOLE SODIUM 40 MG: 40 TABLET, DELAYED RELEASE ORAL at 13:50

## 2024-06-22 RX ADMIN — BUDESONIDE AND FORMOTEROL FUMARATE DIHYDRATE 2 PUFF: 160; 4.5 AEROSOL RESPIRATORY (INHALATION) at 19:54

## 2024-06-22 RX ADMIN — Medication 10 ML: at 13:52

## 2024-06-22 RX ADMIN — FENOFIBRATE 145 MG: 145 TABLET ORAL at 13:50

## 2024-06-22 RX ADMIN — ASPIRIN 81 MG: 81 TABLET, CHEWABLE ORAL at 07:24

## 2024-06-22 RX ADMIN — Medication 10 ML: at 10:57

## 2024-06-22 RX ADMIN — ATORVASTATIN CALCIUM 80 MG: 40 TABLET, FILM COATED ORAL at 20:20

## 2024-06-22 RX ADMIN — CLOPIDOGREL BISULFATE 300 MG: 75 TABLET ORAL at 07:26

## 2024-06-22 RX ADMIN — CETIRIZINE HYDROCHLORIDE 10 MG: 10 TABLET, FILM COATED ORAL at 13:50

## 2024-06-22 NOTE — ED NOTES
"Demetria Johnson    Nursing Report ED to Floor:  Mental status: AXO X4  Ambulatory status: INDEPENDENT  Oxygen Therapy:  ROOM AIR  Cardiac Rhythm: NSR  Admitted from: HOME  Safety Concerns:  NONE  Social Issues: NONE  ED Room #:  14    ED Nurse Phone Extension - [phone] or may call [phone].      HPI:   Chief Complaint   Patient presents with    Numbness       Past Medical History:  Past Medical History:   Diagnosis Date    Allergies         Past Surgical History:  History reviewed. No pertinent surgical history.     Admitting Doctor:   Nelsy Yadav DO    Consulting Provider(s):  Consults       No orders found from 5/24/2024 to 6/23/2024.             Admitting Diagnosis:   The primary encounter diagnosis was Cerebrovascular accident (CVA), unspecified mechanism. Diagnoses of Left sided numbness and Hypertension, unspecified type were also pertinent to this visit.    Most Recent Vitals:   Vitals:    06/22/24 0549 06/22/24 0700   BP: (!) 160/109 140/96   BP Location: Right arm    Patient Position: Lying    Pulse: 94 86   Resp: 18    Temp: 98 °F (36.7 °C)    TempSrc: Oral    SpO2: 98% 94%   Weight: 90.7 kg (200 lb)    Height: 166 cm (65.35\")        Active LDAs/IV Access:   Lines, Drains & Airways       Active LDAs       Name Placement date Placement time Site Days    Peripheral IV 06/22/24 0621 Right Antecubital 06/22/24  0621  Antecubital  less than 1                    Labs (abnormal labs have a star):   Labs Reviewed   COMPREHENSIVE METABOLIC PANEL - Abnormal; Notable for the following components:       Result Value    Glucose 110 (*)     BUN 21 (*)     CO2 30.0 (*)     Alkaline Phosphatase 120 (*)     All other components within normal limits    Narrative:     GFR Normal >60  Chronic Kidney Disease <60  Kidney Failure <15     CBC WITH AUTO DIFFERENTIAL - Abnormal; Notable for the following components:    Neutrophil % 40.3 (*)     Lymphocyte % 45.4 (*)     Lymphocytes, Absolute 3.68 (*)     Eosinophils, " Absolute 0.43 (*)     All other components within normal limits   PROTIME-INR - Normal   POCT PROTIME - INR - Normal   POCT CHEM 8 - Normal   RAINBOW DRAW    Narrative:     The following orders were created for panel order [place] Draw.  Procedure                               Abnormality         Status                     ---------                               -----------         ------                     Green Top (Gel)[[id number]]                                  Final result               Lavender Top[[id number]]                                     Final result               Gold Top - SST[[id number]]                                   Final result               Schmid Top[[id number]]                                         Final result               Light Blue Top[[id number]]                                   Final result                 Please view results for these tests on the individual orders.   POCT GLUCOSE FINGERSTICK   TYPE AND SCREEN   ABORH 2ND SPECIMEN VERIFICATION   CBC AND DIFFERENTIAL    Narrative:     The following orders were created for panel order CBC & Differential.  Procedure                               Abnormality         Status                     ---------                               -----------         ------                     CBC Auto Differential[[id number]]        Abnormal            Final result                 Please view results for these tests on the individual orders.   GREEN TOP   LAVENDER TOP   GOLD TOP - SST   GRAY TOP   LIGHT BLUE TOP       Meds Given in ED:   Medications   sodium chloride 0.9 % flush 10 mL (has no administration in time range)   clopidogrel (PLAVIX) tablet 300 mg (has no administration in time range)     And   clopidogrel (PLAVIX) tablet 75 mg (has no administration in time range)   aspirin chewable tablet 81 mg (has no administration in time range)     Or   aspirin suppository 300 mg (has no administration in time range)   iopamidol (ISOVUE-370) 76 %  injection 100 mL (100 mL Intravenous Given 6/22/24 0651)           Last NIH score:     1a. Level of Consciousness: 0-->Alert, keenly responsive  1b. LOC Questions: 0-->Answers both questions correctly  1c. LOC Commands: 0-->Performs both tasks correctly  2. Best Gaze: 0-->Normal  3. Visual: 0-->No visual loss  4. Facial Palsy: 0-->Normal symmetrical movements  5a. Motor Arm, Left: 0-->No drift, limb holds 90 (or 45) degrees for full 10 secs  5b. Motor Arm, Right: 0-->No drift, limb holds 90 (or 45) degrees for full 10 secs  6a. Motor Leg, Left: 0-->No drift, leg holds 30 degree position for full 5 secs  6b. Motor Leg, Right: 0-->No drift, leg holds 30 degree position for full 5 secs  7. Limb Ataxia: 0-->Absent  8. Sensory: 0-->Normal, no sensory loss  9. Best Language: 0-->No aphasia, normal  10. Dysarthria: 0-->Normal  11. Extinction and Inattention (formerly Neglect): 0-->No abnormality    Total (NIH Stroke Scale): 0     Dysphagia screening results:        Hellen Coma Scale:  No data recorded     CIWA:        Restraint Type:            Isolation Status:  No active isolations

## 2024-06-22 NOTE — THERAPY DISCHARGE NOTE
Acute Care - Speech Language Pathology   Initial Evaluation  [facility]    Cognitive-Communication Evaluation       Patient Name: Demetria Johnson  : 1970  MRN: [id number]  Today's Date: 2024               Admit Date: 2024     Visit Dx:    ICD-10-CM ICD-9-CM   1. Cerebrovascular accident (CVA), unspecified mechanism  I63.9 434.91   2. Left sided numbness  R20.0 782.0   3. Hypertension, unspecified type  I10 401.9     Patient Active Problem List   Diagnosis    H. pylori infection    Chronic GERD    Essential hypertension    Mixed hyperlipidemia    Prediabetes    Left-sided weakness     Past Medical History:   Diagnosis Date    Allergies      History reviewed. No pertinent surgical history.    SLP Recommendation and Plan  SLP Diagnosis: functional speech/language skills, functional cognitive-linguistic skills (24)     Reason for Discharge: all goals and outcomes met, no further needs identified (24)        SLC Criteria for Skilled Therapy Interventions Met: no problems identified which require skilled intervention, baseline status (24)  Anticipated Discharge Disposition (SLP): home (24)        Therapy Frequency (SLP SLC): evaluation only (24)                                       SLP EVALUATION (Last 72 Hours)       SLP SLC Evaluation       Row Name 24                   Communication Assessment/Intervention    Document Type discharge evaluation/summary  -CH        Subjective Information no complaints  -CH        Patient Observations alert;cooperative;agree to therapy  -        Patient/Family/Caregiver Comments/Observations none present  -CH        Patient Effort excellent  -CH        Symptoms Noted During/After Treatment none  -CH           General Information    Patient Profile Reviewed yes  -CH        Pertinent History Of Current Problem MRI pending, CT: No acute findings. NIH 0. Adm with left numbness and hypertension  -         Precautions/Limitations, Vision WFL;for purposes of eval  -        Precautions/Limitations, Hearing WFL;for purposes of eval  -        Prior Level of Function-Communication WFL;English as a second language  -        Plans/Goals Discussed with patient;agreed upon  -        Barriers to Rehab language barrier  Patient reported not knowing the English word at times  -        Patient's Goals for Discharge return to home;return to all previous roles/activities  -           Pain    Additional Documentation Pain Scale: FACES Pre/Post-Treatment (Group)  -           Pain Scale: FACES Pre/Post-Treatment    Pain: FACES Scale, Pretreatment 0-->no hurt  -        Posttreatment Pain Rating 0-->no hurt  -           Comprehension Assessment/Intervention    Comprehension Assessment/Intervention Auditory Comprehension;Reading Comprehension  -           Auditory Comprehension Assessment/Intervention    Auditory Comprehension (Communication) WNL  -           Reading Comprehension Assessment/Intervention    Reading Comprehension (Communication) WFL  -           Expression Assessment/Intervention    Expression Assessment/Intervention verbal expression;graphic expression  -           Verbal Expression Assessment/Intervention    Verbal Expression WNL  -        Automatic Speech (Communication) response to greeting;counting 1-20;months of year;days of week;WNL  -        Repetition sentences;WNL  -        Phrase Completion unpredictable;WNL  -        Responsive Naming complex;WNL  -        Confrontational Naming low frequency;[facility]  -        Spontaneous/Functional Words simple;WNL  -        Sentence Formulation complex;WNL  -        Conversational Discourse/Fluency WF  -           Graphic Expression Assessment/Intervention    Graphic Expression WNL  -           Oral Motor Structure and Function    Oral Motor Structure and Function WNL  -           Motor Speech Assessment/Intervention    Motor  Speech Function WNL  -           Cursory Voice Assessment/Intervention    Quality and Resonance (Voice) WNL  -           Cognitive Assessment Intervention- SLP    Cognitive Function (Cognition) WNL  -        Orientation Status (Cognition) awareness of basic personal information;person;place;time;situation;WNL  -CH        Memory (Cognitive) simple;functional;immediate;short-term;long-term;delayed;WNL  -        Attention (Cognitive) selective;sustained;WNL  -CH        Thought Organization (Cognitive) concrete divergent;abstract divergent;concrete convergent;abstract convergent;verbal sequencing;WNL  -CH        Reasoning (Cognitive) simple;deductive;WNL  -CH        Problem Solving (Cognitive) simple;temporal;WNL  -CH        Pragmatics (Communication) WNL  -        Right Hemisphere Function WNL  -           SLP Evaluation Clinical Impressions    SLP Diagnosis functional speech/language skills;functional cognitive-linguistic skills  -        Rehab Potential/Prognosis excellent  -[facility] Criteria for Skilled Therapy Interventions Met no problems identified which require skilled intervention;baseline status  -        Functional Impact no impact on function  -           Recommendations    Therapy Frequency (SLP SLC) evaluation only  -        Anticipated Discharge Disposition (SLP) home  -           SLP Discharge Summary    Discharge Destination home  -        Discharge Diagnostic Statement No deficits identified with speech, language or cognition at this time  -        Progress Toward Achieving Short/long Term Goals discharge on same date as initial evaluation  -        Reason for Discharge all goals and outcomes met, no further needs identified  -                  User Key  (r) = Recorded By, (t) = Taken By, (c) = Cosigned By      Initials Name Effective Dates     Rosa Adorno MS CCC-SLP 06/16/21 -                        EDUCATION  The patient has been educated in the following  areas:     Cognitive Impairment Communication Impairment.                        Time Calculation:      Time Calculation- SLP       Row Name 06/22/24 1018             Time Calculation- SLP    SLP Start Time 0920  -CH      SLP Received On 06/22/24  -CH         Untimed Charges    SLP Eval/Re-eval  ST Eval Speech and Production w/ Language - 93581  -CH      [id number]-TP Eval Speech and Production w/ Language Minutes 40  -CH         Total Minutes    Untimed Charges Total Minutes 40  -CH       Total Minutes 40  -CH                User Key  (r) = Recorded By, (t) = Taken By, (c) = Cosigned By      Initials Name Provider Type     Rosa Adorno MS CCC-SLP Speech and Language Pathologist                    Therapy Charges for Today       Code Description Service Date Service Provider Modifiers Qty    [id number] HC ST EVAL SPEECH AND PROD W LANG  3 6/22/2024 Rosa Adorno MS CCC-SLP GN 1                       Rosa Adorno MS CCC-SLP  6/22/2024

## 2024-06-22 NOTE — PLAN OF CARE
Goal Outcome Evaluation:  Plan of Care Reviewed With: patient                  Anticipated Discharge Disposition (SLP): home    SLP Diagnosis: functional speech/language skills, functional cognitive-linguistic skills (06/22/24 5687)

## 2024-06-22 NOTE — ED PROVIDER NOTES
[place]    EMERGENCY DEPARTMENT ENCOUNTER      Pt Name: Demetria Johnson  MRN: [id number]  YOB: 1970  Date of evaluation: 6/22/2024  Provider: Ravinder Aquino DO    CHIEF COMPLAINT       Chief Complaint   Patient presents with    Numbness     HPI  Stated Reason for Visit: pt states he started having L arm as well as L leg numbness that started around 1700 yesterday. around 0200 numbness started radiating up L arm as well as L leg. denies thinners. History Obtained From: patient     HISTORY OF PRESENT ILLNESS  (Location/Symptom, Timing/Onset, Context/Setting, Quality, Duration, Modifying Factors, Severity.)   Demetria Johnson is a 54 y.o. male who presents to the emergency department for evaluation of initially some left arm left leg numbness, weakness which started at 5:00 yesterday afternoon.  He notes he went to go  one of his children started feeling little bit of weakness on his left side that the numbness has persisted and went to bed woke up around 2:00 he started noticing some increased numbness to the left arm as well as the dorsal and posterior aspect of the left leg.  He has not had any prior history of stroke TIA or any other underlying neurological issues.  Does not have any history of headache or migraines.  No recent fall, injury or head trauma.  He works in construction.  He has a history of hypercholesterolemia, hypertension is compliant with his medications.  No seizure activity.  He denies any difficulty with ambulation, denies any history of arrhythmias, chest pain or palpitations.  No other acute systemic complaints at this time.      Nursing notes were reviewed.      PAST MEDICAL HISTORY   No past medical history on file.      SURGICAL HISTORY     No past surgical history on file.      CURRENT MEDICATIONS       Current Facility-Administered Medications:     aspirin chewable tablet 81 mg, 81 mg, Oral, Daily **OR** aspirin suppository 300 mg, 300 mg, Rectal,  Daily, Ameya Little PA-C    clopidogrel (PLAVIX) tablet 300 mg, 300 mg, Oral, Once **AND** [START ON 6/23/2024] clopidogrel (PLAVIX) tablet 75 mg, 75 mg, Oral, Daily, Ameya Little PA-C    sodium chloride 0.9 % flush 10 mL, 10 mL, Intravenous, PRN, Ravinder Aqiuno,     Current Outpatient Medications:     amLODIPine (NORVASC) 10 MG tablet, Take 1 tablet by mouth Daily., Disp: 90 tablet, Rfl: 1    aspirin (aspirin) 81 MG EC tablet, Take 1 tablet by mouth Daily., Disp: 90 tablet, Rfl: 1    atorvastatin (Lipitor) 40 MG tablet, Take 1 tablet by mouth Daily., Disp: 90 tablet, Rfl: 1    Azelastine HCl 137 MCG/SPRAY solution, , Disp: , Rfl:     Dupixent 300 MG/2ML solution prefilled syringe, , Disp: , Rfl:     Dymista 137-50 MCG/ACT suspension, , Disp: , Rfl:     fenofibrate (Tricor) 145 MG tablet, Take 1 tablet by mouth Daily., Disp: 90 tablet, Rfl: 1    fluorometholone (FML) 0.1 % ophthalmic suspension, , Disp: , Rfl:     lisinopril (PRINIVIL,ZESTRIL) 10 MG tablet, Take 1 tablet by mouth Daily., Disp: 90 tablet, Rfl: 1    loratadine (CLARITIN) 10 MG tablet, Take 1 tablet by mouth Daily., Disp: , Rfl:     nystatin-triamcinolone (MYCOLOG) 405242-1.1 UNIT/GM-% ointment, Apply  topically to the appropriate area as directed 2 (Two) Times a Day., Disp: 60 g, Rfl: 1    pantoprazole (PROTONIX) 40 MG EC tablet, Take 1 tablet by mouth Every Morning Before Breakfast., Disp: 90 tablet, Rfl: 1    Restasis 0.05 % ophthalmic emulsion, , Disp: , Rfl:     Symbicort 160-4.5 MCG/ACT inhaler, , Disp: , Rfl:     Ventolin  (90 Base) MCG/ACT inhaler, , Disp: , Rfl:     ALLERGIES     Patient has no known allergies.    FAMILY HISTORY     No family history on file.       SOCIAL HISTORY       Social History     Socioeconomic History    Marital status:    Tobacco Use    Smoking status: Never    Smokeless tobacco: Never   Vaping Use    Vaping status: Never Used   Substance and Sexual Activity    Alcohol  "use: Never    Drug use: Never    Sexual activity: Defer         PHYSICAL EXAM    (up to 7 for level 4, 8 or more for level 5)     Vitals:    06/22/24 0549   BP: (!) 160/109   BP Location: Right arm   Patient Position: Lying   Pulse: 94   Resp: 18   Temp: 98 °F (36.7 °C)   TempSrc: Oral   SpO2: 98%   Weight: 90.7 kg (200 lb)   Height: 166 cm (65.35\")       Physical Exam  General : Patient is awake, alert, oriented, in no acute distress, nontoxic appearing  HEENT: Pupils are equally round, EOMI, conjunctivae clear  Neck: Neck is supple, full range of motion, trachea midline  Cardiac: Heart tachycardic rate with regular rhythm, no murmurs, rubs, or gallops  Lungs: Lungs are clear to auscultation, there is no wheezing, rhonchi, or rales. There is no use of accessory muscles  Abdomen: Abdomen is soft, nontender, nondistended. There are no firm or pulsatile masses, no rebound rigidity or guarding  Musculoskeletal: 5 out of 5 strength in all 4 extremities.  No focal muscle deficits are appreciated  Neuro: Patient awake alert and answering questions appropriately, has no facial droop, is 5-5 strength bilateral upper and lower extremities normal finger-to-nose, no dysdiadochokinesia, he has subjective sensory deficit along the left arm the palmar surface rating up towards the left elbow in addition to the left leg dorsal aspect of the foot rating around to the left posterior thigh.  He is ambulatory.  Dermatology: Skin is warm and dry  Psych: Mentation is grossly normal, cognition is grossly normal. Affect is appropriate      DIAGNOSTIC RESULTS     EKG:  All EKGs are interpreted by the Emergency Department Physician who either signs or Co-signs this chart in the absence of a cardiologist.    ECG 12 Lead ED Triage Standing Order; Stroke (Onset >12 hrs)   Final Result   Test Reason : ED Triage Standing Order~   Blood Pressure :   */*   mmHG   Vent. Rate :  96 BPM     Atrial Rate :  96 BPM      P-R Int : 212 ms          QRS Dur " :  92 ms       QT Int : 350 ms       P-R-T Axes :  46  49  33 degrees      QTc Int : 442 ms      Sinus rhythm with 1st degree AV block   Otherwise normal ECG   No previous ECGs available   Confirmed by MARCELO LANE MD ([id number]) on 6/22/2024 6:40:16 AM      Referred By: edmd           Confirmed By: MARCELO LANE MD          RADIOLOGY:     [x] Radiologist's Report Reviewed:  XR Chest 1 View   Final Result   Impression:   No acute cardiopulmonary process.         Electronically Signed: Kaye Mcfarland MD     6/22/2024 7:07 AM EDT     Workstation ID: [id number]      CT Angiogram Head w AI Analysis of LVO   Final Result   Impression:   No evidence of hemodynamically significant stenosis, AVM or aneurysm within the head or neck. No large vessel occlusion identified. No acute process.            Electronically Signed: Kaye Mcfarland MD     6/22/2024 6:57 AM EDT     Workstation ID: [id number]      CT CEREBRAL PERFUSION WITH & WITHOUT CONTRAST   Final Result   Impression:   No evidence of core infarct or ischemic penumbra.            Electronically Signed: Kaye Mcfarland MD     6/22/2024 6:56 AM EDT     Workstation ID: [id number]      CT Angiogram Neck   Final Result   Impression:   No evidence of hemodynamically significant stenosis, AVM or aneurysm within the head or neck. No large vessel occlusion identified. No acute process.            Electronically Signed: Kaye Mcfarland MD     6/22/2024 6:57 AM EDT     Workstation ID: [id number]      CT Head Without Contrast Stroke Protocol   Final Result   Impression:   No evidence of hemorrhage, mass effect or midline shift. No acute process identified.            Electronically Signed: Kaye Mcfarland MD     6/22/2024 6:24 AM EDT     Workstation ID: [id number]          I ordered and independently reviewed the above noted radiographic studies.      I viewed images of CT head which showed no acute intracranial abnormality per my independent interpretation.    See radiologist's dictation for official  interpretation.      ED BEDSIDE ULTRASOUND:   Performed by ED Physician - none    LABS:    I have reviewed and interpreted all of the currently available lab results from this visit (if applicable):  Results for orders placed or performed during the hospital encounter of 06/22/24   Comprehensive Metabolic Panel    Specimen: Blood   Result Value Ref Range    Glucose 110 (H) 65 - 99 mg/dL    BUN 21 (H) 6 - 20 mg/dL    Creatinine 1.10 0.76 - 1.27 mg/dL    Sodium 140 136 - 145 mmol/L    Potassium 3.8 3.5 - 5.2 mmol/L    Chloride 102 98 - 107 mmol/L    CO2 30.0 (H) 22.0 - 29.0 mmol/L    Calcium 9.2 8.6 - 10.5 mg/dL    Total Protein 7.4 6.0 - 8.5 g/dL    Albumin 3.9 3.5 - 5.2 g/dL    ALT (SGPT) 19 1 - 41 U/L    AST (SGOT) 18 1 - 40 U/L    Alkaline Phosphatase 120 (H) 39 - 117 U/L    Total Bilirubin 0.2 0.0 - 1.2 mg/dL    Globulin 3.5 gm/dL    A/G Ratio 1.1 g/dL    BUN/Creatinine Ratio 19.1 7.0 - 25.0    Anion Gap 8.0 5.0 - 15.0 mmol/L    eGFR 80.3 >60.0 mL/min/1.73   Protime-INR    Specimen: Blood   Result Value Ref Range    Protime 12.7 12.2 - 14.5 Seconds    INR 0.95 0.89 - 1.12   CBC Auto Differential    Specimen: Blood   Result Value Ref Range    WBC 8.10 3.40 - 10.80 10*3/mm3    RBC 4.92 4.14 - 5.80 10*6/mm3    Hemoglobin 14.3 13.0 - 17.7 g/dL    Hematocrit 43.4 37.5 - 51.0 %    MCV 88.2 79.0 - 97.0 fL    MCH 29.1 26.6 - 33.0 pg    MCHC 32.9 31.5 - 35.7 g/dL    RDW 13.0 12.3 - 15.4 %    RDW-SD 41.8 37.0 - 54.0 fl    MPV 10.5 6.0 - 12.0 fL    Platelets 230 140 - 450 10*3/mm3    Neutrophil % 40.3 (L) 42.7 - 76.0 %    Lymphocyte % 45.4 (H) 19.6 - 45.3 %    Monocyte % 7.7 5.0 - 12.0 %    Eosinophil % 5.3 0.3 - 6.2 %    Basophil % 0.9 0.0 - 1.5 %    Immature Grans % 0.4 0.0 - 0.5 %    Neutrophils, Absolute 3.27 1.70 - 7.00 10*3/mm3    Lymphocytes, Absolute 3.68 (H) 0.70 - 3.10 10*3/mm3    Monocytes, Absolute 0.62 0.10 - 0.90 10*3/mm3    Eosinophils, Absolute 0.43 (H) 0.00 - 0.40 10*3/mm3    Basophils, Absolute 0.07 0.00 -  0.20 10*3/mm3    Immature Grans, Absolute 0.03 0.00 - 0.05 10*3/mm3    nRBC 0.0 0.0 - 0.2 /100 WBC   POC Protime / INR    Specimen: Blood   Result Value Ref Range    Protime 13.2 12.8 - 15.2 seconds    INR 1.1 0.8 - 1.2   POC CHEM 8    Specimen: Blood   Result Value Ref Range    Glucose 109 70 - 130 mg/dL    BUN 22 8 - 26 mg/dL    Creatinine 1.10 0.60 - 1.30 mg/dL    Sodium 140 138 - 146 mmol/L    POC Potassium 3.8 3.5 - 4.9 mmol/L    Chloride 101 98 - 109 mmol/L    Total CO2 28 24 - 29 mmol/L    Hemoglobin 15.0 12.0 - 17.0 g/dL    Hematocrit 44 38 - 51 %    Ionized Calcium 1.28 1.20 - 1.32 mmol/L    eGFR 80.3 >60.0 mL/min/1.73   ECG 12 Lead ED Triage Standing Order; Stroke (Onset >12 hrs)   Result Value Ref Range    QT Interval 350 ms    QTC Interval 442 ms   Green Top (Gel)   Result Value Ref Range    Extra Tube Hold for add-ons.    Lavender Top   Result Value Ref Range    Extra Tube hold for add-on    Gold Top - SST   Result Value Ref Range    Extra Tube Hold for add-ons.    Gray Top   Result Value Ref Range    Extra Tube Hold for add-ons.    Light Blue Top   Result Value Ref Range    Extra Tube Hold for add-ons.         If labs were ordered, I independently reviewed the results and considered them in treating the patient.      EMERGENCY DEPARTMENT COURSE and DIFFERENTIAL DIAGNOSIS/MDM:   Vitals:  AS OF 07:10 EDT    BP - (!) 160/109  HR - 94  TEMP - 98 °F (36.7 °C) (Oral)  O2 SATS - 98%      Orders placed during this visit:  Orders Placed This Encounter   Procedures    CT Head Without Contrast Stroke Protocol    XR Chest 1 View    CT Angiogram Head w AI Analysis of LVO    CT CEREBRAL PERFUSION WITH & WITHOUT CONTRAST    CT Angiogram Neck    Comprehensive Metabolic Panel    Protime-INR    [place] Draw    CBC Auto Differential    NPO Diet NPO Type: Strict NPO    Undress and Gown    Continuous Pulse Oximetry    Vital Signs    Nursing Dysphagia Screening (Complete Prior to Giving anything PO)    RN to Place Order SLP  Consult (IF swallow screen failed) - Eval & Treat Choosing Reason of RN Dysphagia Screen Failed    Nursing Dysphagia Screening (Complete Prior to Giving Anything By Mouth)    RN to Place Order SLP Consult - Eval & Treat Choosing Reason of RN Dysphagia Screen Failed    Oxygen Therapy- Nasal Cannula; Titrate 1-6 LPM Per SpO2; 90 - 95%    POC Glucose Once    POC Protime / INR    POC CHEM 8    ECG 12 Lead ED Triage Standing Order; Stroke (Onset >12 hrs)    Type & Screen    ABO RH Specimen Verification    Insert Peripheral IV    CBC & Differential    Green Top (Gel)    Lavender Top    Gold Top - SST    Gray Top    Light Blue Top       All labs have been independently reviewed by me.  All radiology studies have been reviewed by me and the radiologist dictating the report.  All EKG's have been independently viewed and interpreted by me.      Discussion below represents my analysis of pertinent findings related to patient's condition, differential diagnosis, treatment plan and final disposition.    Differential diagnosis:  The differential diagnosis associated with the patient's presentation includes: CVA, TIA, hypertensive encephalopathy, peripheral neuropathy, radicular electrolyte abnormalities    Additional sources  Discussed/ obtained information from independent historians:   [] Spouse  [] Parent  [] Family member  [] Friend  [] EMS   [] Other:    External (non-ED) record review:   [] Inpatient record:   [] Office record:   [] Outpatient record:   [] Prior Outpatient labs:   [] Prior Outpatient radiology:   [] Primary Care record:   [] Outside ED record:   [] Other:     Patient's care impacted by:   [] Diabetes  [x] Hypertension  [] CHF  [x] Hyperlipidemia  [] Coronary Artery Disease   [] COPD   [] Cancer   [] Tobacco Abuse   [] Substance Abuse    [] Other:     Care significantly affected by Social Determinants of Health (housing and economic circumstances, unemployment)    [] Yes     [x] No   If yes, Patient's care  significantly limited by Social Determinants of Health including:   [] Inadequate housing   [] Low income   [] Alcoholism and drug addiction in family   [] Problems related to primary support group   [] Unemployment   [] Problems related to employment   [] Other Social Determinants of Health:       MEDICATIONS ADMINISTERED IN ED:  Medications   sodium chloride 0.9 % flush 10 mL (has no administration in time range)   clopidogrel (PLAVIX) tablet 300 mg (has no administration in time range)     And   clopidogrel (PLAVIX) tablet 75 mg (has no administration in time range)   aspirin chewable tablet 81 mg (has no administration in time range)     Or   aspirin suppository 300 mg (has no administration in time range)   iopamidol (ISOVUE-370) 76 % injection 100 mL (100 mL Intravenous Given 6/22/24 0651)              This is a pleasant 54-year-old male who yesterday afternoon started noticing some left-sided numbness, weakness in his upper extremity, has persisted with increasing numbness along the left lower extremity as well which prompted his evaluation in the hospital this morning.  After my assessment we discussed moving forward with stroke workup and evaluation given his history of hyperlipidemia and hypertension with some mainly sensory but some mild motor deficits in the left upper extremity.  Case was discussed with stroke navigator, CT angiography and perfusion studies obtained.  Imaging does not reveal any acute large vessel occlusion.  Blood work and labs are stable.  On reassessment and further discussion with neurology at the patient would benefit from admission to the hospital for further workup, stroke assessments.  Case discussed with hospitalist Dr. Yadav, for admission.        PROCEDURES:  Procedures    CRITICAL CARE TIME    Total Critical Care time was 30 minutes, excluding separately reportable procedures.  Acute neurological deficit, stroke workup with left-sided weakness, numbness and tingling  requiring multiple neurochecks, reassessments, discussions with consultants. There was a high probability of clinically significant/life threatening deterioration in the patient's condition which required my urgent intervention.      FINAL IMPRESSION      1. Cerebrovascular accident (CVA), unspecified mechanism    2. Left sided numbness    3. Hypertension, unspecified type          DISPOSITION/PLAN     ED Disposition       ED Disposition   Decision to Admit    Condition   --    Comment   --                 Comment: Please note this report has been produced using speech recognition software.      Ravinder Aquino DO  Attending Emergency Physician         Ravinder Aquino,   06/22/24 0790

## 2024-06-22 NOTE — H&P
[facility] Medicine Services  HISTORY AND PHYSICAL    Patient Name: Demetria Johnson  : 1970  MRN: [id number]  Primary Care Physician: Samantha Saravia APRN  Date of admission: 2024      Subjective   Subjective     Chief Complaint:  Left arm weakness and numbness    HPI:  Demetria Johnson is a 54 y.o. male with a past medical history of HTN, HLD and GERD presented to the ED complaining of left arm weakness and numbness. He states it began last night around 1700. He states he had left arm and leg numbness again at 2 am this morning. He feels improved but continues to have some decreased sensation of his LUE and LLE. He denies weakness. Stroke/neuro following. Imaging in the ED is negative thus far. Patient will be admitted to the hospitalist service for further evaluation and treatment.      Personal History     Past Medical History:   Diagnosis Date    Allergies            History reviewed. No pertinent surgical history.    Family History: family history is not on file.     Social History:  reports that he has never smoked. He has never used smokeless tobacco. He reports that he does not drink alcohol and does not use drugs.  Social History     Social History Narrative    Not on file       Medications:  Available home medication information reviewed.  Azelastine HCl, Azelastine-Fluticasone, Dupilumab, albuterol sulfate HFA, amLODIPine, aspirin, atorvastatin, budesonide-formoterol, cycloSPORINE, fenofibrate, fluorometholone, lisinopril, loratadine, nystatin-triamcinolone, and pantoprazole    No Known Allergies    Objective   Objective     Vital Signs:   Temp:  [97.6 °F (36.4 °C)-98.1 °F (36.7 °C)] 97.6 °F (36.4 °C)  Heart Rate:  [80-94] 80  Resp:  [17-18] 17  BP: (121-160)/() 121/93  Total (NIH Stroke Scale): 0    Physical Exam   Constitutional: No acute distress, awake, alert  HENT: NCAT, mucous membranes moist  Respiratory: Clear to auscultation bilaterally,  respiratory effort normal   Cardiovascular: RRR, no murmurs, rubs, or gallops  Gastrointestinal: Positive bowel sounds, soft, nontender, nondistended  Musculoskeletal: No bilateral ankle edema  Psychiatric: Appropriate affect, cooperative  Neurologic: Oriented x 3, PETERSON, decreased sensation to LUE and LLE, speech clear  Skin: No rashes    Result Review:  I have personally reviewed the results from the time of this admission to 6/22/2024 12:17 EDT and agree with these findings:  [x]  Laboratory list / accordion  []  Microbiology  [x]  Radiology  []  EKG/Telemetry   []  Cardiology/Vascular   []  Pathology  []  Old records  []  Other:    LAB RESULTS:      Lab 06/22/24  0623 06/22/24 0621   WBC  --  8.10   HEMOGLOBIN  --  14.3   HEMOGLOBIN, POC 15.0  --    HEMATOCRIT  --  43.4   HEMATOCRIT POC 44  --    PLATELETS  --  230   NEUTROS ABS  --  3.27   IMMATURE GRANS (ABS)  --  0.03   LYMPHS ABS  --  3.68*   MONOS ABS  --  0.62   EOS ABS  --  0.43*   MCV  --  88.2   PROTIME 13.2 12.7   INR 1.1 0.95         Lab 06/22/24  0623 06/22/24 0621   SODIUM  --  140   POTASSIUM  --  3.8   CHLORIDE  --  102   CO2  --  30.0*   ANION GAP  --  8.0   BUN  --  21*   CREATININE 1.10 1.10   EGFR 80.3 80.3   GLUCOSE  --  110*   CALCIUM  --  9.2         Lab 06/22/24 0621   TOTAL PROTEIN 7.4   ALBUMIN 3.9   GLOBULIN 3.5   ALT (SGPT) 19   AST (SGOT) 18   BILIRUBIN 0.2   ALK PHOS 120*                 Lab 06/22/24 0621   ABO TYPING A   RH TYPING Positive   ANTIBODY SCREEN Negative             Microbiology Results (last 10 days)       ** No results found for the last 240 hours. **            XR Chest 1 View    Result Date: 6/22/2024  XR CHEST 1 VW Date of Exam: 6/22/2024 7:00 AM EDT Indication: Stroke Protocol (Onset > 12 hrs) Comparison: 5/16/2023. Findings: There are no airspace consolidations. No pleural fluid. No pneumothorax. The pulmonary vasculature appears within normal limits. The cardiac and mediastinal silhouette appear unremarkable.  No acute osseous abnormality identified.     Impression: Impression: No acute cardiopulmonary process. Electronically Signed: Kaye Mcfarland MD  6/22/2024 7:07 AM EDT  Workstation ID: [id number]    CT Angiogram Head w AI Analysis of LVO    Result Date: 6/22/2024  CT ANGIOGRAM HEAD W AI ANALYSIS OF LVO, CT ANGIOGRAM NECK Date of Exam: 6/22/2024 6:20 AM EDT Indication: Neuro deficit, acute, stroke suspected Acute Stroke. Comparison: 6/22/2024. Technique: CTA of the head and neck was performed after the uneventful intravenous administration of intravenous contrast. Reconstructed coronal and sagittal images were also obtained. In addition, a 3-D volume rendered image was created for interpretation. Automated exposure control and iterative reconstruction methods were used. Findings: No evidence of hemodynamically significant stenosis, AVM or aneurysm within the head or neck by NASCET criteria. No large vessel occlusion identified. No evidence of paucity of vasculature. No evidence of dissection. Soft tissues of the neck demonstrate no acute process. No acute osseous abnormality identified. Lung apices appear clear.     Impression: Impression: No evidence of hemodynamically significant stenosis, AVM or aneurysm within the head or neck. No large vessel occlusion identified. No acute process. Electronically Signed: Kaye Mcfarland MD  6/22/2024 6:57 AM EDT  Workstation ID: [id number]    CT Angiogram Neck    Result Date: 6/22/2024  CT ANGIOGRAM HEAD W AI ANALYSIS OF LVO, CT ANGIOGRAM NECK Date of Exam: 6/22/2024 6:20 AM EDT Indication: Neuro deficit, acute, stroke suspected Acute Stroke. Comparison: 6/22/2024. Technique: CTA of the head and neck was performed after the uneventful intravenous administration of intravenous contrast. Reconstructed coronal and sagittal images were also obtained. In addition, a 3-D volume rendered image was created for interpretation. Automated exposure control and iterative reconstruction methods were  used. Findings: No evidence of hemodynamically significant stenosis, AVM or aneurysm within the head or neck by NASCET criteria. No large vessel occlusion identified. No evidence of paucity of vasculature. No evidence of dissection. Soft tissues of the neck demonstrate no acute process. No acute osseous abnormality identified. Lung apices appear clear.     Impression: Impression: No evidence of hemodynamically significant stenosis, AVM or aneurysm within the head or neck. No large vessel occlusion identified. No acute process. Electronically Signed: Kaye Mcfarland MD  6/22/2024 6:57 AM EDT  Workstation ID: [id number]    CT CEREBRAL PERFUSION WITH & WITHOUT CONTRAST    Result Date: 6/22/2024  CT CEREBRAL PERFUSION W WO CONTRAST Date of Exam: 6/22/2024 6:20 AM EDT Indication: Neuro deficit, acute, stroke suspected.  Comparison: 6/20/2024. Technique: Axial CT images of the brain were obtained prior to and after the administration of intravenous contrast. Core blood volume, core blood flow, mean transit time, and Tmax images were obtained utilizing the Rapid software protocol. A limited CT angiogram of the head was also performed to measure the blood vessel density. The radiation dose reduction device was turned on for each scan per the ALARA (As Low as Reasonably Achievable) protocol. Findings: The cerebral blood flow less than 30% is 0 mL. Tmax greater than 6 seconds is 0 mL. Mismatch volume is 0 mL. Mismatch ratio is none. Perfusion images appear homogeneous and symmetric. No defect identified.     Impression: Impression: No evidence of core infarct or ischemic penumbra. Electronically Signed: Kaye Mcfarland MD  6/22/2024 6:56 AM EDT  Workstation ID: [id number]    CT Head Without Contrast Stroke Protocol    Result Date: 6/22/2024  CT HEAD WO CONTRAST STROKE PROTOCOL Date of Exam: 6/22/2024 6:14 AM EDT Indication: Stroke, follow up Neuro deficit, acute, stroke suspected. Comparison: None available. Technique: Axial CT images  were obtained of the head without contrast administration.  Reconstructed coronal images were also obtained. Automated exposure control and iterative construction methods were used. Scan Time: 6:14 a.m. Results discussed with Dr. Aquino at 6:20 a.m. Findings: There is no evidence of hemorrhage. There is no mass effect or midline shift. There is no extracerebral collection. Ventricles are normal in size and configuration for patient's stated age.  Posterior fossa is within normal limits. Calvarium and skull base appear intact.   Visualized sinuses show no air fluid levels. Significant paranasal sinus disease involving all of the paranasal sinuses with no evidence of air-fluid levels. Visualized orbits are unremarkable.     Impression: Impression: No evidence of hemorrhage, mass effect or midline shift. No acute process identified. Electronically Signed: Kaye Mcfarland MD  6/22/2024 6:24 AM EDT  Workstation ID: [id number]         Assessment & Plan   Assessment & Plan       Left-sided weakness    Chronic GERD    Essential hypertension    Mixed hyperlipidemia    Prediabetes    Demetria Johnson is a 54 y.o. male with a past medical history of HTN, HLD and GERD presented to the ED complaining of left arm weakness and numbness.     Left Sided numbness and weakness  - stroke/neuro following  - CTA H/N negative for LVO  - CT perfusion negative  - MRI brain w/o pending  - start aspirin 81mg PO daily and Plavix 300mg PO load followed by 75mg daily  - passed bedside swallow- diet ordered  - TTE pending  - LDL pending  - started on Atorvastatin 80mg nightly  - HgA1C pending  -neurochecks  - PT/OT/SLP    GERD  - PPI    HTN  - allow for permissive HTN with SBP< 200  - hold amlodipine and lisinopril    HLD  - LDL pending  - started on Atorvastatin 80mg nightly    Prediabetes  - A1C pending       VTE Prophylaxis:  Mechanical VTE prophylaxis orders are present.          CODE STATUS:    Code Status and Medical Interventions:    Ordered at: 06/22/24 1214     Level Of Support Discussed With:    Patient     Code Status (Patient has no pulse and is not breathing):    CPR (Attempt to Resuscitate)     Medical Interventions (Patient has pulse or is breathing):    Full Support       Expected Discharge   Expected discharge date/ time has not been documented.     Nelsy Yadav, DO  06/22/24

## 2024-06-22 NOTE — CONSULTS
Stroke Consult Note    Patient Name: Demetria Johnson      MRN: [id number]  Age: 54 y.o.     Sex: male     : 1970  Primary Care Physician: Samantha Saravia APRN  Referring Physician:  Dr. Aquino, [facility]  Handedness: Right  Time Stroke Team Called: 0609        Time Patient Seen: 0612  Chief Complaint/Reason for Consultation: Left-sided numbness and weakness    HPI  Last Known Normal Date/Time: 1700 on 2024     This patient is a 54-year-old male with past medical history significant for hypertension, and hyperlipidemia who presented to [facility] with concern for left arm weakness and numbness.  Patient states he noticed weakness on his left side yesterday at approximately 5 PM when he attempted to  his infant son.  At that time, patient had associated symptoms of diaphoresis, and chest pressure.  Patient states he first noticed left arm and left leg numbness at approximately 0200 this morning.  On my exam, patient is NIH 1 for decreased sensation in LUE/LLE.  No appreciable weakness or other focal neurologic deficits are apparent on exam.  Of note, patient states he was previously prescribed aspirin 81 mg daily but stopped taking this medication because it gave him a warm sensation throughout his body which she did not like.  Patient does not take any other antithrombotics.  Patient reports adherence with other medications.  Code stroke CT imaging negative for acute abnormality.  Patient will be admitted to general medicine for ongoing management and further stroke workup.    Review of Systems   Constitutional:  Negative for chills and fever.   HENT:  Negative for trouble swallowing.    Eyes:  Negative for visual disturbance.   Respiratory:  Negative for shortness of breath.    Cardiovascular:  Negative for chest pain.   Gastrointestinal:  Negative for abdominal pain.   Musculoskeletal:  Negative for myalgias.   Neurological:  Positive for numbness. Negative for tremors, facial asymmetry,  speech difficulty, weakness, light-headedness and headaches.   Psychiatric/Behavioral:  Negative for behavioral problems. The patient is not nervous/anxious.       Objective  Neurological Exam  Mental Status  Alert. Oriented to person, place, time and situation. Speech is normal. Language is fluent with no aphasia. Attention and concentration are normal. Fund of knowledge is appropriate for level of education.    Cranial Nerves  CN II: Visual fields full to confrontation.  CN III, IV, VI: Extraocular movements intact bilaterally. Pupils equal round and reactive to light bilaterally.  CN V:  Right: Facial sensation is normal.  Left: Facial sensation is normal on the left.  CN VII:  Right: There is no facial weakness.  Left: There is no facial weakness.  CN VIII: Hearing grossly intact bilaterally.  CN IX, X: Palate elevates symmetrically  CN XII: Tongue midline without atrophy or fasciculations.    Motor  Normal muscle bulk throughout. Normal muscle tone. Strength is 5/5 throughout all four extremities.    Sensory  Decree sensation to light touch on LUE and LLE.    Coordination  Right: Finger-to-nose normal. Rapid alternating movement normal.Left: Finger-to-nose normal. Rapid alternating movement normal.    Physical Exam  Constitutional:       General: He is not in acute distress.  HENT:      Head: Normocephalic and atraumatic.   Eyes:      Extraocular Movements: Extraocular movements intact.      Pupils: Pupils are equal, round, and reactive to light.   Cardiovascular:      Rate and Rhythm: Normal rate.   Pulmonary:      Effort: Pulmonary effort is normal.   Musculoskeletal:         General: No swelling.   Skin:     General: Skin is warm and dry.   Neurological:      Mental Status: He is alert.      Motor: Motor strength is normal.  Psychiatric:         Mood and Affect: Mood normal.         Speech: Speech normal.         Behavior: Behavior normal.     Temp:  [98 °F (36.7 °C)] 98 °F (36.7 °C)  Heart Rate:  [94]  94  Resp:  [18] 18  BP: (160)/(109) 160/109    No past medical history on file.  No past surgical history on file.  No family history on file.  Social History     Socioeconomic History    Marital status:    Tobacco Use    Smoking status: Never    Smokeless tobacco: Never   Vaping Use    Vaping status: Never Used   Substance and Sexual Activity    Alcohol use: Never    Drug use: Never    Sexual activity: Defer     No Known Allergies    Prior to Admission medications    Medication Sig Start Date End Date Taking? Authorizing Provider   amLODIPine (NORVASC) 10 MG tablet Take 1 tablet by mouth Daily. 3/25/24   Samantha Saravia APRN   aspirin (aspirin) 81 MG EC tablet Take 1 tablet by mouth Daily. 5/16/22   Tj Caba MD   atorvastatin (Lipitor) 40 MG tablet Take 1 tablet by mouth Daily. 4/3/24   Samantha Saravia APRN   Azelastine HCl 137 MCG/SPRAY solution  3/21/24   Corrine De Anda MD   Dupixent 300 MG/2ML solution prefilled syringe  1/4/21   Corrine De Anda MD   Dymista 137-50 MCG/ACT suspension  10/14/22   Corrine De Anda MD   fenofibrate (Tricor) 145 MG tablet Take 1 tablet by mouth Daily. 4/3/24   Samantha Saravia APRN   fluorometholone (FML) 0.1 % ophthalmic suspension  2/15/22   Corrine De Anda MD   lisinopril (PRINIVIL,ZESTRIL) 10 MG tablet Take 1 tablet by mouth Daily. 3/25/24   Samantha Saravia APRN   loratadine (CLARITIN) 10 MG tablet Take 1 tablet by mouth Daily. 10/13/22   Corrine De Anda MD   nystatin-triamcinolone (MYCOLOG) 881053-8.1 UNIT/GM-% ointment Apply  topically to the appropriate area as directed 2 (Two) Times a Day. 4/15/21   Tj Caba MD   pantoprazole (PROTONIX) 40 MG EC tablet Take 1 tablet by mouth Every Morning Before Breakfast. 3/25/24   Samantha Saravia APRN   Restasis 0.05 % ophthalmic emulsion  2/27/22   Corrine De Anda MD   Symbicort 160-4.5 MCG/ACT inhaler  4/15/23   Corrine De Anda MD   Ventolin  (90 Base)  MCG/ACT inhaler  2/7/23   Provider, MD Corrine     Acute Stroke Data  Alteplase (tPA) Inclusion / Exclusion Criteria  Person Administering Scale: Ameya Little PA-C    Inclusion Criteria  [x]   18 years of age or greater   []   Onset of symptoms < 4.5 hours before beginning treatment (stroke onset = time patient was last seen well or without symptoms).   []   Diagnosis of acute ischemic stroke causing measurable disabling deficit (Complete Hemianopia, Any Aphasia, Visual or Sensory Extinction, Any weakness limiting sustained effort against gravity)   []   Any remaining deficit considered potentially disabling in view of patient and practitioner   Exclusion criteria (Do not proceed with Alteplase if any are checked under exclusion criteria)  [x]   Onset unknown or GREATER than 4.5 hours   []   ICH on CT/MRI   []   CT demonstrates hypodensity representing acute or subacute infarct   []   Significant head trauma or prior stroke in the previous 3 months   []   Symptoms suggestive of subarachnoid hemorrhage   []   History of un-ruptured intracranial aneurysm GREATER than 10 mm   []   Recent intracranial or intraspinal surgery within the last 3 months   []   Arterial puncture at a non-compressible site in the previous 7 days   []   Active internal bleeding   []   Acute bleeding tendency   []   Platelet count LESS than 100,000 for known hematological diseases such as leukemia, thrombocytopenia or chronic cirrhosis   []   Current use of anticoagulant with INR GREATER than 1.7 or PT GREATER than 15 seconds, aPTT GREATER than 40 seconds   []   Heparin received within 48 hours, resulting in abnormally elevated aPTT GREATER than upper limit of normal   []   Current use of direct thrombin inhibitors or direct factor Xa inhibitors in the past 48 hours   []   Elevated blood pressure refractory to treatment (systolic GREATER than 185 mm/Hg or diastolic  GREATER than 110 mm/Hg   []   Suspected infective endocarditis and  aortic arch dissection   []   Current use of therapeutic treatment dose of low-molecular-weight heparin (LMWH) within the previous 24 hours   []   Structural GI malignancy or bleed   Relative exclusion for all patients  []   Only minor non-disabling symptoms   []   Pregnancy   []   Seizure at onset with postictal residual neurological impairments   []   Major surgery or previous trauma within past 14 days   []   History of previous spontaneous ICH, intracranial neoplasm, or AV malformation   []   Postpartum (within previous 14 days)   []   Recent GI or urinary tract hemorrhage (within previous 21 days)   []   Recent acute MI (within previous 3 months)   []   History of un-ruptured intracranial aneurysm LESS than 10 mm   []   History of ruptured intracranial aneurysm   []   Blood glucose LESS than 50 mg/dL (2.7 mmol/L)   []   Dural puncture within the last 7 days   []   Known GREATER than 10 cerebral microbleeds   Additional exclusions for patients with symptoms onset between 3 and 4.5 hours.  []   Age > 80.   []   On any anticoagulants regardless of INR  >>> Warfarin (Coumadin), Heparin, Enoxaparin (Lovenox), fondaparinux (Arixtra), bivalirudin (Angiomax), Argatroban, dabigatran (Pradaxa), rivaroxaban (Xarelto), or apixaban (Eliquis)   []   Severe stroke (NIHSS > 25).   []   History of BOTH diabetes and previous ischemic stroke.   []   The risks and benefits have been discussed with the patient or family related to the administration of IV Alteplase for stroke symptoms.   []   I have discussed and reviewed the patient's case and imaging with the attending prior to IV Alteplase.   N/A Time Alteplase administered     MODIFIED MARCO SCALE (to be assessed for each patient having history of stroke) []Stroke history but not assessed  [x]0: No symptoms at all  []1: No significant disability despite symptoms  []2: Slight disability  []3: Moderate disability  []4: Moderately severe disability  []5: Severe disability  []6:  Death      NIH Stroke Scale  Time: 620  Person Administering Scale: Ameya Little PA-C    1a  Level of consciousness: 0=alert; keenly responsive   1b. LOC questions:  0=Performs both tasks correctly   1c. LOC commands: 0=Performs both tasks correctly   2.  Best Gaze: 0=normal   3.  Visual: 0=No visual loss   4. Facial Palsy: 0=Normal symmetric movement   5a.  Motor left arm: 0=No drift, limb holds 90 (or 45) degrees for full 10 seconds   5b.  Motor right arm: 0=No drift, limb holds 90 (or 45) degrees for full 10 seconds   6a. motor left le=No drift, limb holds 90 (or 45) degrees for full 10 seconds   6b  Motor right le=No drift, limb holds 90 (or 45) degrees for full 10 seconds   7. Limb Ataxia: 0=Absent   8.  Sensory: 1=Mild to moderate sensory loss; patient feels pinprick is less sharp or is dull on the affected side; there is a loss of superficial pain with pinprick but patient is aware He is being touched   9. Best Language:  0=No aphasia, normal   10. Dysarthria: 0=Normal   11. Extinction and Inattention: 0=No abnormality    Total:    1     Hospital Meds  Scheduled- aspirin, 81 mg, Oral, Daily   Or  aspirin, 300 mg, Rectal, Daily  clopidogrel, 300 mg, Oral, Once   And  [START ON 2024] clopidogrel, 75 mg, Oral, Daily    Infusions-     PRNs-   sodium chloride    Results Reviewed  I have personally reviewed current lab, radiology, and data and agree with results.    CT Angiogram Head w AI Analysis of LVO    Result Date: 2024  Impression: No evidence of hemodynamically significant stenosis, AVM or aneurysm within the head or neck. No large vessel occlusion identified. No acute process. Electronically Signed: Kaye Mcfarland MD  2024 6:57 AM EDT  Workstation ID: [id number]    CT Angiogram Neck    Result Date: 2024  Impression: No evidence of hemodynamically significant stenosis, AVM or aneurysm within the head or neck. No large vessel occlusion identified. No acute process. Electronically  Signed: Kaye Mcfarland MD  6/22/2024 6:57 AM EDT  Workstation ID: [id number]    CT CEREBRAL PERFUSION WITH & WITHOUT CONTRAST    Result Date: 6/22/2024  Impression: No evidence of core infarct or ischemic penumbra. Electronically Signed: Kaye Mcfarland MD  6/22/2024 6:56 AM EDT  Workstation ID: [id number]    CT Head Without Contrast Stroke Protocol    Result Date: 6/22/2024  Impression: No evidence of hemorrhage, mass effect or midline shift. No acute process identified. Electronically Signed: Kaye Mcfarland MD  6/22/2024 6:24 AM EDT  Workstation ID: [id number]      Significant Labs  Sodium: 140  Creatinine: 1.10  Glucose: 109  WBC: 8.10  Hgb/HCT: 14.3/43.3  Platelets: 230    Assessment and Plan  This patient is a 54-year-old male with risk factors significant for HTN and HLD who presented to [facility] ED 6/22/2024 with concern for left-sided weakness and numbness.  Patient first noted onset of left-sided weakness at 1700 on 6/21.  Patient also had associated symptoms of diaphoresis and chest pressure at that time.  Patient also noted left-sided numbness onset at approximately 0200 this morning.  Of note, patient reports being prescribed daily aspirin 81 mg but stopped taking the medication because it gave him a warm sensation which she did not like.  Code stroke CT imaging negative for acute abnormality.  Patient was not a candidate for IV thrombolytics or emergent neurointervention.    Antiplatelet PTA: None   Anticoagulant PTA: None    Left-sided numbness and weakness  -Differentials include acute stroke/TIA, acute coronary syndrome should also remain in the differential  -Code stroke CTh with no evidence of acute intracranial abnormality  -CTa H/N with no evidence of LVO, aneurysm, or significant flow-limiting stenosis  -CT perfusion with no evidence of core infarct or significant territorial ischemic tissue at risk  -MRI brain without contrast pending  -DAPT initiated with aspirin 81 mg daily and Plavix 300 mg load followed by  75 mg daily  -Allow permissive hypertension, SBP goal < 200  -NPO pending bedside swallow eval  -TTE pending  -LDL pending, start atorvastatin 80 mg nightly  -A1c pending  -Serial neurochecks per policy, stat Cth for any acute neurological change  -PT/OT/SLP as appropriate    Disposition: Admit to general medicine      Case discussed with the patient, and Dr. Aquino.  Thank you for the consult. Stroke neurology will continue to follow.       Ameya Little PA-C  [facility] [place] [facility]

## 2024-06-22 NOTE — Clinical Note
Level of Care: Telemetry [5]   Diagnosis: Left-sided weakness [508588]   Admitting Physician: SUKHWINDER CIFUENTES [[id number]]

## 2024-06-23 ENCOUNTER — READMISSION MANAGEMENT (OUTPATIENT)
Dept: CALL CENTER | Facility: HOSPITAL | Age: 54
End: 2024-06-23
Payer: COMMERCIAL

## 2024-06-23 VITALS
WEIGHT: 209.44 LBS | BODY MASS INDEX: 33.66 KG/M2 | HEIGHT: 66 IN | HEART RATE: 99 BPM | SYSTOLIC BLOOD PRESSURE: 136 MMHG | TEMPERATURE: 98 F | OXYGEN SATURATION: 94 % | RESPIRATION RATE: 17 BRPM | DIASTOLIC BLOOD PRESSURE: 84 MMHG

## 2024-06-23 LAB
ALBUMIN SERPL-MCNC: 3.8 G/DL (ref 3.5–5.2)
ALBUMIN/GLOB SERPL: 1.2 G/DL
ALP SERPL-CCNC: 118 U/L (ref 39–117)
ALT SERPL W P-5'-P-CCNC: 19 U/L (ref 1–41)
ANION GAP SERPL CALCULATED.3IONS-SCNC: 12 MMOL/L (ref 5–15)
AST SERPL-CCNC: 17 U/L (ref 1–40)
BILIRUB SERPL-MCNC: 0.3 MG/DL (ref 0–1.2)
BUN SERPL-MCNC: 14 MG/DL (ref 6–20)
BUN/CREAT SERPL: 14.9 (ref 7–25)
CALCIUM SPEC-SCNC: 8.9 MG/DL (ref 8.6–10.5)
CHLORIDE SERPL-SCNC: 105 MMOL/L (ref 98–107)
CHOLEST SERPL-MCNC: 133 MG/DL (ref 0–200)
CO2 SERPL-SCNC: 24 MMOL/L (ref 22–29)
CREAT SERPL-MCNC: 0.94 MG/DL (ref 0.76–1.27)
DEPRECATED RDW RBC AUTO: 41.5 FL (ref 37–54)
EGFRCR SERPLBLD CKD-EPI 2021: 96.9 ML/MIN/1.73
ERYTHROCYTE [DISTWIDTH] IN BLOOD BY AUTOMATED COUNT: 13.2 % (ref 12.3–15.4)
GLOBULIN UR ELPH-MCNC: 3.1 GM/DL
GLUCOSE SERPL-MCNC: 113 MG/DL (ref 65–99)
HBA1C MFR BLD: 6 % (ref 4.8–5.6)
HCT VFR BLD AUTO: 44.4 % (ref 37.5–51)
HDLC SERPL-MCNC: 37 MG/DL (ref 40–60)
HGB BLD-MCNC: 14.7 G/DL (ref 13–17.7)
LDLC SERPL CALC-MCNC: 75 MG/DL (ref 0–100)
LDLC/HDLC SERPL: 1.97 {RATIO}
MAGNESIUM SERPL-MCNC: 2 MG/DL (ref 1.6–2.6)
MCH RBC QN AUTO: 29 PG (ref 26.6–33)
MCHC RBC AUTO-ENTMCNC: 33.1 G/DL (ref 31.5–35.7)
MCV RBC AUTO: 87.6 FL (ref 79–97)
PLATELET # BLD AUTO: 249 10*3/MM3 (ref 140–450)
PMV BLD AUTO: 10.8 FL (ref 6–12)
POTASSIUM SERPL-SCNC: 4.3 MMOL/L (ref 3.5–5.2)
PROT SERPL-MCNC: 6.9 G/DL (ref 6–8.5)
RBC # BLD AUTO: 5.07 10*6/MM3 (ref 4.14–5.8)
SODIUM SERPL-SCNC: 141 MMOL/L (ref 136–145)
TRIGL SERPL-MCNC: 115 MG/DL (ref 0–150)
TSH SERPL DL<=0.05 MIU/L-ACNC: 1.12 UIU/ML (ref 0.27–4.2)
VLDLC SERPL-MCNC: 21 MG/DL (ref 5–40)
WBC NRBC COR # BLD AUTO: 8.12 10*3/MM3 (ref 3.4–10.8)

## 2024-06-23 PROCEDURE — 85027 COMPLETE CBC AUTOMATED: CPT | Performed by: HOSPITALIST

## 2024-06-23 PROCEDURE — 83036 HEMOGLOBIN GLYCOSYLATED A1C: CPT

## 2024-06-23 PROCEDURE — 80061 LIPID PANEL: CPT

## 2024-06-23 PROCEDURE — 80053 COMPREHEN METABOLIC PANEL: CPT | Performed by: HOSPITALIST

## 2024-06-23 PROCEDURE — G0378 HOSPITAL OBSERVATION PER HR: HCPCS

## 2024-06-23 PROCEDURE — 84443 ASSAY THYROID STIM HORMONE: CPT | Performed by: HOSPITALIST

## 2024-06-23 PROCEDURE — 97161 PT EVAL LOW COMPLEX 20 MIN: CPT

## 2024-06-23 PROCEDURE — 94799 UNLISTED PULMONARY SVC/PX: CPT

## 2024-06-23 PROCEDURE — 83735 ASSAY OF MAGNESIUM: CPT | Performed by: HOSPITALIST

## 2024-06-23 PROCEDURE — 99238 HOSP IP/OBS DSCHRG MGMT 30/<: CPT | Performed by: INTERNAL MEDICINE

## 2024-06-23 PROCEDURE — 99213 OFFICE O/P EST LOW 20 MIN: CPT | Performed by: NURSE PRACTITIONER

## 2024-06-23 RX ORDER — ATORVASTATIN CALCIUM 80 MG/1
80 TABLET, FILM COATED ORAL NIGHTLY
Qty: 90 TABLET | Refills: 0 | Status: SHIPPED | OUTPATIENT
Start: 2024-06-23

## 2024-06-23 RX ORDER — CLOPIDOGREL BISULFATE 75 MG/1
75 TABLET ORAL DAILY
Qty: 20 TABLET | Refills: 0 | Status: SHIPPED | OUTPATIENT
Start: 2024-06-24

## 2024-06-23 RX ADMIN — ASPIRIN 81 MG: 81 TABLET, CHEWABLE ORAL at 08:42

## 2024-06-23 RX ADMIN — PANTOPRAZOLE SODIUM 40 MG: 40 TABLET, DELAYED RELEASE ORAL at 08:42

## 2024-06-23 RX ADMIN — CLOPIDOGREL BISULFATE 75 MG: 75 TABLET ORAL at 08:41

## 2024-06-23 RX ADMIN — NYSTATIN AND TRIAMCINOLONE ACETONIDE: 100000; 1 OINTMENT TOPICAL at 08:43

## 2024-06-23 RX ADMIN — BUDESONIDE AND FORMOTEROL FUMARATE DIHYDRATE 2 PUFF: 160; 4.5 AEROSOL RESPIRATORY (INHALATION) at 09:11

## 2024-06-23 RX ADMIN — CETIRIZINE HYDROCHLORIDE 10 MG: 10 TABLET, FILM COATED ORAL at 08:42

## 2024-06-23 RX ADMIN — Medication 10 ML: at 08:44

## 2024-06-23 RX ADMIN — FENOFIBRATE 145 MG: 145 TABLET ORAL at 08:42

## 2024-06-23 RX ADMIN — CYCLOSPORINE 1 DROP: 0.5 EMULSION OPHTHALMIC at 08:41

## 2024-06-23 NOTE — PROGRESS NOTES
Stroke Progress Note       Chief Complaint: Left arm weakness and numbness    Subjective    Subjective     Subjective: No adverse events overnight.  Patient sitting up in bed.  He feels he is back to baseline with the exception of an area of focal decreased sensation on his left ankle.      Review of Systems   Eyes:  Negative for visual disturbance.   Respiratory:  Negative for cough and shortness of breath.    Cardiovascular:  Negative for chest pain and palpitations.   Gastrointestinal:  Negative for nausea and vomiting.   Skin: Negative.    Neurological:  Positive for numbness. Negative for facial asymmetry, speech difficulty, weakness and headaches.   Psychiatric/Behavioral: Negative.              Objective    Objective      Temp:  [97.6 °F (36.4 °C)-98 °F (36.7 °C)] 98 °F (36.7 °C)  Heart Rate:  [] 91  Resp:  [16-18] 16  BP: (121-141)/(83-93) 141/83        Neurological Exam  Mental Status  Alert. Oriented to person, place, and time. Speech is normal. Language is fluent with no aphasia. Attention and concentration are normal.    Cranial Nerves  CN II: Visual fields full to confrontation.  CN III, IV, VI: Extraocular movements intact bilaterally. Normal lids and orbits bilaterally. Pupils equal round and reactive to light bilaterally.  CN V: Facial sensation is normal.  CN VII: Full and symmetric facial movement.  CN XI: Shoulder shrug strength is normal.  CN XII: Tongue midline without atrophy or fasciculations.    Motor  Normal muscle bulk throughout. No fasciculations present. Normal muscle tone. No abnormal involuntary movements. Strength is 5/5 throughout all four extremities.    Sensory  Light touch abnormality:   Decreased sensation in the left lower extremity but isolated to the anterior ankle only.    Coordination    Finger-to-nose, rapid alternating movements and heel-to-shin normal bilaterally without dysmetria.    Gait    Not tested.      Physical Exam  Vitals and nursing note reviewed.    Constitutional:       Appearance: Normal appearance.   HENT:      Head: Normocephalic and atraumatic.   Eyes:      General: Lids are normal.      Extraocular Movements: Extraocular movements intact.      Pupils: Pupils are equal, round, and reactive to light.   Cardiovascular:      Rate and Rhythm: Normal rate.   Pulmonary:      Effort: Pulmonary effort is normal. No respiratory distress.   Musculoskeletal:         General: No swelling. Normal range of motion.      Cervical back: Normal range of motion.   Skin:     General: Skin is warm and dry.   Neurological:      Mental Status: He is alert and oriented to person, place, and time.      Cranial Nerves: No cranial nerve deficit.      Sensory: Sensory deficit present.      Motor: Motor strength is normal.No weakness.      Coordination: Coordination is intact.   Psychiatric:         Mood and Affect: Mood normal.         Speech: Speech normal.         Behavior: Behavior normal.         Results Review:    I reviewed the patient's new clinical results.  WBC   Date Value Ref Range Status   06/23/2024 8.12 3.40 - 10.80 10*3/mm3 Final     RBC   Date Value Ref Range Status   06/23/2024 5.07 4.14 - 5.80 10*6/mm3 Final     Hemoglobin   Date Value Ref Range Status   06/23/2024 14.7 13.0 - 17.7 g/dL Final     Hematocrit   Date Value Ref Range Status   06/23/2024 44.4 37.5 - 51.0 % Final     MCV   Date Value Ref Range Status   06/23/2024 87.6 79.0 - 97.0 fL Final     MCH   Date Value Ref Range Status   06/23/2024 29.0 26.6 - 33.0 pg Final     MCHC   Date Value Ref Range Status   06/23/2024 33.1 31.5 - 35.7 g/dL Final     RDW   Date Value Ref Range Status   06/23/2024 13.2 12.3 - 15.4 % Final     RDW-SD   Date Value Ref Range Status   06/23/2024 41.5 37.0 - 54.0 fl Final     MPV   Date Value Ref Range Status   06/23/2024 10.8 6.0 - 12.0 fL Final     Platelets   Date Value Ref Range Status   06/23/2024 249 140 - 450 10*3/mm3 Final     Neutrophil %   Date Value Ref Range Status    06/22/2024 40.3 (L) 42.7 - 76.0 % Final     Lymphocyte %   Date Value Ref Range Status   06/22/2024 45.4 (H) 19.6 - 45.3 % Final     Monocyte %   Date Value Ref Range Status   06/22/2024 7.7 5.0 - 12.0 % Final     Eosinophil %   Date Value Ref Range Status   06/22/2024 5.3 0.3 - 6.2 % Final     Basophil %   Date Value Ref Range Status   06/22/2024 0.9 0.0 - 1.5 % Final     Immature Grans %   Date Value Ref Range Status   06/22/2024 0.4 0.0 - 0.5 % Final     Neutrophils, Absolute   Date Value Ref Range Status   06/22/2024 3.27 1.70 - 7.00 10*3/mm3 Final     Lymphocytes, Absolute   Date Value Ref Range Status   06/22/2024 3.68 (H) 0.70 - 3.10 10*3/mm3 Final     Monocytes, Absolute   Date Value Ref Range Status   06/22/2024 0.62 0.10 - 0.90 10*3/mm3 Final     Eosinophils, Absolute   Date Value Ref Range Status   06/22/2024 0.43 (H) 0.00 - 0.40 10*3/mm3 Final     Basophils, Absolute   Date Value Ref Range Status   06/22/2024 0.07 0.00 - 0.20 10*3/mm3 Final     Immature Grans, Absolute   Date Value Ref Range Status   06/22/2024 0.03 0.00 - 0.05 10*3/mm3 Final     nRBC   Date Value Ref Range Status   06/22/2024 0.0 0.0 - 0.2 /100 WBC Final     Lab Results   Component Value Date    GLUCOSE 113 (H) 06/23/2024    BUN 14 06/23/2024    CREATININE 0.94 06/23/2024    EGFR 96.9 06/23/2024    BCR 14.9 06/23/2024    K 4.3 06/23/2024    CO2 24.0 06/23/2024    CALCIUM 8.9 06/23/2024    ALBUMIN 3.8 06/23/2024    BILITOT 0.3 06/23/2024    AST 17 06/23/2024    ALT 19 06/23/2024     A1c 6.0  LDL 75    CT Angiogram Head w AI Analysis of LVO    Result Date: 6/22/2024  Impression: No evidence of hemodynamically significant stenosis, AVM or aneurysm within the head or neck. No large vessel occlusion identified. No acute process. Electronically Signed: Kaye Mcfarland MD  6/22/2024 6:57 AM EDT  Workstation ID: [id number]    CT Angiogram Neck    Result Date: 6/22/2024  Impression: No evidence of hemodynamically significant stenosis, AVM or  aneurysm within the head or neck. No large vessel occlusion identified. No acute process. Electronically Signed: Kaye Mcfarland MD  6/22/2024 6:57 AM EDT  Workstation ID: [id number]    CT CEREBRAL PERFUSION WITH & WITHOUT CONTRAST    Result Date: 6/22/2024  Impression: No evidence of core infarct or ischemic penumbra. Electronically Signed: Kaye Mcfarland MD  6/22/2024 6:56 AM EDT  Workstation ID: [id number]    CT Head Without Contrast Stroke Protocol    Result Date: 6/22/2024  Impression: No evidence of hemorrhage, mass effect or midline shift. No acute process identified. Electronically Signed: Kaye Mcfarland MD  6/22/2024 6:24 AM EDT  Workstation ID: [id number]     Results for orders placed during the hospital encounter of 06/22/24    Adult Transthoracic Echo Complete W/ Cont if Necessary Per Protocol (With Agitated Saline)    Interpretation Summary    Left ventricular systolic function is normal. Calculated left ventricular EF = 59.4% Left ventricular ejection fraction appears to be 56 - 60%.    Left ventricular diastolic function was normal.    Saline test results are negative.            Assessment/Plan     Assessment/Plan: 54-year-old male with known diagnosis of HTN and HLD who presented with left arm weakness and numbness.  Also had reported chest pressure and diaphoresis at time of symptom onset.  NIHSS 1.  CT head negative for acute abnormality.  CTA head and neck negative for hemodynamically flow-limiting stenosis, no LVO.  CT perfusion is negative.      TIA  -No diffusion restriction on MRI to indicate acute infarct suspect patient's symptoms are likely due to TIA.  -ABCD2 score 5; moderate risk with a 90-day stroke risk 9.8%  -Continue DAPT x 21 days followed by aspirin monotherapy  -TTE shows EF of 56 to 60%, normal left atrium, negative bubble study  -HTN normal blood pressure parameters, <130/80  -HLD: Continue atorvastatin; LDL 75, goal <70  -Follow-up in the stroke clinic in 1 month    Discussed the  importance of medication compliance Plavix 75mg daily, Aspirin 81mg daily, and Atorvastatin 80mg nightly and lifestyle modifications adequate control of blood pressure, adequate control of cholesterol (goal LDL <70), increased physical activity, and implementation of healthy diet to help reduce the risk of future cerebrovascular events.  Also discussed the signs symptoms that would warrant the patient return back to the emergency department including unilateral weakness, unilateral numbness, visual disturbances, loss of balance, speech difficulties, and/or a sudden severe headache.  Patient verbalized understanding.    Plan of care discussed with patient and Dr. Tinoco, patient is ready for hospital discharge from stroke neurology standpoint.    Rosenda Wood, KIMBERLEE  06/23/24  08:15 EDT

## 2024-06-23 NOTE — DISCHARGE SUMMARY
[facility] Medicine Services  DISCHARGE SUMMARY    Patient Name: Demetria Johnson  : 1970  MRN: [id number]    Date of Admission: 2024  5:53 AM  Date of Discharge: 2024  Primary Care Physician: Samantha Saravia APRN    Consults       No orders found for last 30 day(s).            Hospital Course       Active Hospital Problems    Diagnosis  POA    **Left-sided weakness [R53.1]  Yes    Mixed hyperlipidemia [E78.2]  Yes    Prediabetes [R73.03]  Yes    Chronic GERD [K21.9]  Yes    Essential hypertension [I10]  Yes      Resolved Hospital Problems   No resolved problems to display.          Hospital Course:  Demetria Johnson is a 54 y.o. male w/ HTN, HLD, GERD, who presented to ED with complaints of LT UE weakness and numbness starting the previous night, had recurrent symptoms early a.m.  Motor symptoms improved but paresthesias persisted.  He was admitted for stroke workup, MRI was negative for acute infarct, stroke neurology suspected TIA recommended DAPT x 21 days followed by aspirin monotherapy.  TTE showed preserved EF with negative bubble study.      Discharge Follow Up Recommendations for outpatient labs/diagnostics:  PCP 1-2 weeks  Ambulatory referral for stroke neuro clinic placed by stroke service    Day of Discharge     HPI:   No acute complaints, back to baseline.    Vital Signs:   Temp:  [97.9 °F (36.6 °C)-98 °F (36.7 °C)] 98 °F (36.7 °C)  Heart Rate:  [86-99] 99  Resp:  [16-18] 17  BP: (133-141)/(83-92) 136/84      Physical Exam:  Constitutional: Awake, alert, sitting up in bed no acute distress  Respiratory: Clear to auscultation bilaterally, respiratory effort normal   Cardiovascular: RRR, palpable radial pulse  Gastrointestinal: Positive bowel sounds, soft, nontender, nondistended  Psychiatric: Appropriate affect, cooperative  Neurologic: Speech clear and fluent, moving extremities spontaneously    Pertinent  and/or Most Recent Results     LAB  RESULTS:      Lab 06/23/24 0434 06/22/24 0623 06/22/24 0621   WBC 8.12  --  8.10   HEMOGLOBIN 14.7  --  14.3   HEMOGLOBIN, POC  --  15.0  --    HEMATOCRIT 44.4  --  43.4   HEMATOCRIT POC  --  44  --    PLATELETS 249  --  230   NEUTROS ABS  --   --  3.27   IMMATURE GRANS (ABS)  --   --  0.03   LYMPHS ABS  --   --  3.68*   MONOS ABS  --   --  0.62   EOS ABS  --   --  0.43*   MCV 87.6  --  88.2   PROTIME  --  13.2 12.7         Lab 06/23/24 0434 06/22/24 0623 06/22/24 0621   SODIUM 141  --  140   POTASSIUM 4.3  --  3.8   CHLORIDE 105  --  102   CO2 24.0  --  30.0*   ANION GAP 12.0  --  8.0   BUN 14  --  21*   CREATININE 0.94 1.10 1.10   EGFR 96.9 80.3 80.3   GLUCOSE 113*  --  110*   CALCIUM 8.9  --  9.2   MAGNESIUM 2.0  --   --    HEMOGLOBIN A1C 6.00*  --   --    TSH 1.120  --   --          Lab 06/23/24 0434 06/22/24 0621   TOTAL PROTEIN 6.9 7.4   ALBUMIN 3.8 3.9   GLOBULIN 3.1 3.5   ALT (SGPT) 19 19   AST (SGOT) 17 18   BILIRUBIN 0.3 0.2   ALK PHOS 118* 120*         Lab 06/22/24 0623 06/22/24 0621   PROTIME 13.2 12.7   INR 1.1 0.95         Lab 06/23/24 0434   CHOLESTEROL 133   LDL CHOL 75   HDL CHOL 37*   TRIGLYCERIDES 115         Lab 06/22/24  0842 06/22/24 0621   ABO TYPING A A   RH TYPING Positive Positive   ANTIBODY SCREEN  --  Negative         Brief Urine Lab Results       None          Microbiology Results (last 10 days)       ** No results found for the last 240 hours. **            MRI Brain Without Contrast    Result Date: 6/23/2024  MRI BRAIN WO CONTRAST Date of Exam: 6/22/2024 10:49 PM EDT Indication: Stroke, follow up Acute stroke suspected.  Comparison: Head CT 6/22/2024 Technique:  Routine multiplanar/multisequence sequence images of the brain were obtained without contrast administration. Findings: No areas of diffusion restriction to suggest a recent infarct. Negative for acute intracranial hemorrhage, large mass lesion, midline shift or hydrocephalus. No extra-axial fluid collection.  Parenchymal volume normal for age. No significant white matter disease. Major intracranial flow voids appear preserved. Globes are symmetric. Normal posterior pituitary T1 bright spot. Corpus callosum normal in size. Negative for cerebellar tonsillar ectopia. Fairly extensive multifocal sinus disease including near complete opacification of the ethmoid sinuses, maxillary sinuses and right sphenoid sinus. Possible prior right maxillary antrectomy.     Impression: 1. No acute MRI findings. Specifically no findings to suggest a recent infarct. 2. Fairly extensive sinus disease without air-fluid level. Consider outpatient ENT follow-up. Electronically Signed: Ramiro Ruffin MD  6/23/2024 8:24 AM EDT  Workstation ID: [id number]    Adult Transthoracic Echo Complete W/ Cont if Necessary Per Protocol (With Agitated Saline)    Result Date: 6/22/2024    Left ventricular systolic function is normal. Calculated left ventricular EF = 59.4% Left ventricular ejection fraction appears to be 56 - 60%.   Left ventricular diastolic function was normal.   Saline test results are negative.     XR Chest 1 View    Result Date: 6/22/2024  XR CHEST 1 VW Date of Exam: 6/22/2024 7:00 AM EDT Indication: Stroke Protocol (Onset > 12 hrs) Comparison: 5/16/2023. Findings: There are no airspace consolidations. No pleural fluid. No pneumothorax. The pulmonary vasculature appears within normal limits. The cardiac and mediastinal silhouette appear unremarkable. No acute osseous abnormality identified.     Impression: No acute cardiopulmonary process. Electronically Signed: Kaye Mcfarland MD  6/22/2024 7:07 AM EDT  Workstation ID: [id number]    CT Angiogram Head w AI Analysis of LVO    Result Date: 6/22/2024  CT ANGIOGRAM HEAD W AI ANALYSIS OF LVO, CT ANGIOGRAM NECK Date of Exam: 6/22/2024 6:20 AM EDT Indication: Neuro deficit, acute, stroke suspected Acute Stroke. Comparison: 6/22/2024. Technique: CTA of the head and neck was performed after the uneventful  intravenous administration of intravenous contrast. Reconstructed coronal and sagittal images were also obtained. In addition, a 3-D volume rendered image was created for interpretation. Automated exposure control and iterative reconstruction methods were used. Findings: No evidence of hemodynamically significant stenosis, AVM or aneurysm within the head or neck by NASCET criteria. No large vessel occlusion identified. No evidence of paucity of vasculature. No evidence of dissection. Soft tissues of the neck demonstrate no acute process. No acute osseous abnormality identified. Lung apices appear clear.     Impression: No evidence of hemodynamically significant stenosis, AVM or aneurysm within the head or neck. No large vessel occlusion identified. No acute process. Electronically Signed: Kaye Mcfarland MD  6/22/2024 6:57 AM EDT  Workstation ID: [id number]    CT Angiogram Neck    Result Date: 6/22/2024  CT ANGIOGRAM HEAD W AI ANALYSIS OF LVO, CT ANGIOGRAM NECK Date of Exam: 6/22/2024 6:20 AM EDT Indication: Neuro deficit, acute, stroke suspected Acute Stroke. Comparison: 6/22/2024. Technique: CTA of the head and neck was performed after the uneventful intravenous administration of intravenous contrast. Reconstructed coronal and sagittal images were also obtained. In addition, a 3-D volume rendered image was created for interpretation. Automated exposure control and iterative reconstruction methods were used. Findings: No evidence of hemodynamically significant stenosis, AVM or aneurysm within the head or neck by NASCET criteria. No large vessel occlusion identified. No evidence of paucity of vasculature. No evidence of dissection. Soft tissues of the neck demonstrate no acute process. No acute osseous abnormality identified. Lung apices appear clear.     Impression: No evidence of hemodynamically significant stenosis, AVM or aneurysm within the head or neck. No large vessel occlusion identified. No acute process.  Electronically Signed: Kaye Mcfarland MD  6/22/2024 6:57 AM EDT  Workstation ID: [id number]    CT CEREBRAL PERFUSION WITH & WITHOUT CONTRAST    Result Date: 6/22/2024  CT CEREBRAL PERFUSION W WO CONTRAST Date of Exam: 6/22/2024 6:20 AM EDT Indication: Neuro deficit, acute, stroke suspected.  Comparison: 6/20/2024. Technique: Axial CT images of the brain were obtained prior to and after the administration of intravenous contrast. Core blood volume, core blood flow, mean transit time, and Tmax images were obtained utilizing the Rapid software protocol. A limited CT angiogram of the head was also performed to measure the blood vessel density. The radiation dose reduction device was turned on for each scan per the ALARA (As Low as Reasonably Achievable) protocol. Findings: The cerebral blood flow less than 30% is 0 mL. Tmax greater than 6 seconds is 0 mL. Mismatch volume is 0 mL. Mismatch ratio is none. Perfusion images appear homogeneous and symmetric. No defect identified.     Impression: No evidence of core infarct or ischemic penumbra. Electronically Signed: Kaye Mcfarland MD  6/22/2024 6:56 AM EDT  Workstation ID: [id number]    CT Head Without Contrast Stroke Protocol    Result Date: 6/22/2024  CT HEAD WO CONTRAST STROKE PROTOCOL Date of Exam: 6/22/2024 6:14 AM EDT Indication: Stroke, follow up Neuro deficit, acute, stroke suspected. Comparison: None available. Technique: Axial CT images were obtained of the head without contrast administration.  Reconstructed coronal images were also obtained. Automated exposure control and iterative construction methods were used. Scan Time: 6:14 a.m. Results discussed with Dr. Aquino at 6:20 a.m. Findings: There is no evidence of hemorrhage. There is no mass effect or midline shift. There is no extracerebral collection. Ventricles are normal in size and configuration for patient's stated age.  Posterior fossa is within normal limits. Calvarium and skull base appear intact.   Visualized  sinuses show no air fluid levels. Significant paranasal sinus disease involving all of the paranasal sinuses with no evidence of air-fluid levels. Visualized orbits are unremarkable.     Impression: No evidence of hemorrhage, mass effect or midline shift. No acute process identified. Electronically Signed: Kaye Mcfarland MD  6/22/2024 6:24 AM EDT  Workstation ID: [id number]             Results for orders placed during the hospital encounter of 06/22/24    Adult Transthoracic Echo Complete W/ Cont if Necessary Per Protocol (With Agitated Saline)    Interpretation Summary    Left ventricular systolic function is normal. Calculated left ventricular EF = 59.4% Left ventricular ejection fraction appears to be 56 - 60%.    Left ventricular diastolic function was normal.    Saline test results are negative.      Discharge Details        Discharge Medications        New Medications        Instructions Start Date   clopidogrel 75 MG tablet  Commonly known as: PLAVIX   75 mg, Oral, Daily   Start Date: June 24, 2024            Changes to Medications        Instructions Start Date   atorvastatin 80 MG tablet  Commonly known as: LIPITOR  What changed:   medication strength  how much to take  when to take this   80 mg, Oral, Nightly             Continue These Medications        Instructions Start Date   amLODIPine 10 MG tablet  Commonly known as: NORVASC   10 mg, Oral, Daily      aspirin 81 MG EC tablet   81 mg, Oral, Daily      Azelastine HCl 137 MCG/SPRAY solution       Dupixent 300 MG/2ML solution prefilled syringe  Generic drug: Dupilumab   No dose, route, or frequency recorded.      Dymista 137-50 MCG/ACT suspension  Generic drug: Azelastine-Fluticasone   No dose, route, or frequency recorded.      fenofibrate 145 MG tablet  Commonly known as: Tricor   145 mg, Oral, Daily      fluorometholone 0.1 % ophthalmic suspension  Commonly known as: FML   No dose, route, or frequency recorded.      lisinopril 10 MG tablet  Commonly known  as: PRINIVIL,ZESTRIL   10 mg, Oral, Daily      loratadine 10 MG tablet  Commonly known as: CLARITIN   10 mg, Oral, Daily      nystatin-triamcinolone 442769-3.1 UNIT/GM-% ointment  Commonly known as: MYCOLOG   Topical, 2 Times Daily      pantoprazole 40 MG EC tablet  Commonly known as: PROTONIX   40 mg, Oral, Every Morning Before Breakfast      Restasis 0.05 % ophthalmic emulsion  Generic drug: cycloSPORINE   No dose, route, or frequency recorded.      Symbicort 160-4.5 MCG/ACT inhaler  Generic drug: budesonide-formoterol   No dose, route, or frequency recorded.      Ventolin  (90 Base) MCG/ACT inhaler  Generic drug: albuterol sulfate HFA   No dose, route, or frequency recorded.               No Known Allergies      Discharge Disposition:  Home or Self Care    Diet:  Hospital:  Diet Order   Procedures    Diet: Regular/House, Cardiac; Healthy Heart (2-3 Na+); Texture: Regular (IDDSI 7); Fluid Consistency: Thin (IDDSI 0)              CODE STATUS:    Code Status and Medical Interventions:   Ordered at: 06/22/24 1214     Level Of Support Discussed With:    Patient     Code Status (Patient has no pulse and is not breathing):    CPR (Attempt to Resuscitate)     Medical Interventions (Patient has pulse or is breathing):    Full Support       Future Appointments   Date Time Provider Department Center   9/17/2024  8:15 AM Samantha Saravia APRN [facility]       Additional Instructions for the Follow-ups that You Need to Schedule       Discharge Follow-up with PCP   As directed       Currently Documented PCP:    Samantha Saravia APRN    PCP Phone Number:    [phone]     Follow Up Details: 1-2 weeks                      Ld Hernandez DO  06/23/24      Time Spent on Discharge:  I spent  25  minutes on this discharge activity which included: face-to-face encounter with the patient, reviewing the data in the system, coordination of the care with the nursing staff as well as consultants, documentation, and  entering orders.

## 2024-06-23 NOTE — THERAPY DISCHARGE NOTE
Patient Name: Demetria Johnson  : 1970    MRN: [id number]                              Today's Date: 2024       Admit Date: 2024    Visit Dx:     ICD-10-CM ICD-9-CM   1. Cerebrovascular accident (CVA), unspecified mechanism  I63.9 434.91   2. Left sided numbness  R20.0 782.0   3. Hypertension, unspecified type  I10 401.9     Patient Active Problem List   Diagnosis    H. pylori infection    Chronic GERD    Essential hypertension    Mixed hyperlipidemia    Prediabetes    Left-sided weakness     Past Medical History:   Diagnosis Date    Allergies      History reviewed. No pertinent surgical history.   General Information       Row Name 24 09          Physical Therapy Time and Intention    Document Type discharge evaluation/summary  -BRIDGET     Mode of Treatment physical therapy  -BRIDGET       Row Name 24 09          General Information    Patient Profile Reviewed yes  -BRIDGET     Prior Level of Function independent:;all household mobility;community mobility;ADL's;driving  Works in [employer]  -BRIDGET     Existing Precautions/Restrictions other (see comments)  L sided sensory deficit  -BRIDGET     Barriers to Rehab none identified  -BRIDGET       Row Name 24 0938          Living Environment    People in Home spouse;child(ernie), dependent  -BRIDGET       Row Name 24 09          Home Main Entrance    Number of Stairs, Main Entrance none  -BRIDGET       Row Name 24 0938          Stairs Within Home, Primary    Stair Railings, Within Home, Primary none  -BRIDGET       Row Name 24 0938          Cognition    Orientation Status (Cognition) oriented x 4  -BRIDGET       Row Name 24          Safety Issues, Functional Mobility    Impairments Affecting Function (Mobility) sensation/sensory awareness  -BRIDGET               User Key  (r) = Recorded By, (t) = Taken By, (c) = Cosigned By      Initials Name Provider Type    Kelly Hale, PT Physical Therapist                   Mobility        Row Name 06/23/24 0939          Bed Mobility    Bed Mobility supine-sit;sit-supine;scooting/bridging  -BRIDGET     Scooting/Bridging [place] (Bed Mobility) independent  -BRIDGET     Supine-Sit [place] (Bed Mobility) independent  -BRIDGET     Sit-Supine [place] (Bed Mobility) independent  -BRIDGET     Assistive Device (Bed Mobility) head of bed elevated  -BRIDGET     Comment, (Bed Mobility) no assist needed.  -BRIDGET       Row Name 06/23/24 0939          Transfers    Comment, (Transfers) Independent  -BRIDGET       Row Name 06/23/24 0939          Sit-Stand Transfer    Sit-Stand [place] (Transfers) independent  -BRIDGET       Row Name 06/23/24 0939          Gait/Stairs (Locomotion)    Patient was able to Ambulate yes  -BRIDGET     Distance in Feet (Gait) 50  -BRIDGET     Comment, (Gait/Stairs) Pt demo appropriate step through gait pattern. no significant deficits. No pain or unsteadiness.  -BRIDGET               User Key  (r) = Recorded By, (t) = Taken By, (c) = Cosigned By      Initials Name Provider Type    Kelly Hale, PT Physical Therapist                   Obj/Interventions       Row Name 06/23/24 0941          Strength Comprehensive (MMT)    General Manual Muscle Testing (MMT) Assessment no strength deficits identified  -BRIDGET     Comment, General Manual Muscle Testing (MMT) Assessment LEs 5/5 symmetrical.  -BRIDGET       Row Name 06/23/24 0941          Balance    Balance Assessment sitting static balance;sitting dynamic balance;sit to stand dynamic balance;standing static balance  -BRIDGET     Static Sitting Balance independent  -BRIDGET     Dynamic Sitting Balance independent  -BRIDGET     Position, Sitting Balance unsupported;sitting edge of bed  -BRIDGET     Sit to Stand Dynamic Balance independent  -BRIDGET     Static Standing Balance independent  -BRIDGET     Dynamic Standing Balance independent  -BRIDGET     Position/Device Used, Standing Balance unsupported  -BRIDGET     Balance Interventions sitting;standing;sit to stand  -BRIDGET       Row Name 06/23/24 0941          Sensory  Assessment (Somatosensory)    Sensory Assessment (Somatosensory) other (see comments)  Pt reported decreased sensation on dorsum of L foot and hand.  -BRIDGET               User Key  (r) = Recorded By, (t) = Taken By, (c) = Cosigned By      Initials Name Provider Type    Kelly Hale, PT Physical Therapist                   Goals/Plan    No documentation.                  Clinical Impression       Row Name 06/23/24 0942          Pain    Pretreatment Pain Rating 0/10 - no pain  -BRIDGET     Posttreatment Pain Rating 0/10 - no pain  -BRIDGET       Row Name 06/23/24 0942          Plan of Care Review    Plan of Care Reviewed With patient  -BRIDGET     Progress no change  -BRIDGET     Outcome Evaluation Pt presents with mild sensory deficits of L UE and L LUE, at baseline of independence with transfers and gait. No skilled needs at this time. DC home.  -BRIDGET       Row Name 06/23/24 0942          Therapy Assessment/Plan (PT)    Patient/Family Therapy Goals Statement (PT) Return to PLOF  -BRIDGET     Criteria for Skilled Interventions Met (PT) no;no problems identified which require skilled intervention  -BRIDGET     Therapy Frequency (PT) evaluation only  -BRIDGET       Row Name 06/23/24 0942          Vital Signs    Pre Systolic BP Rehab 123  -BRIDGET     Pre Treatment Diastolic BP 80  -BRIDGET     Post Systolic BP Rehab 136  -BRIDGET     Post Treatment Diastolic BP 84  -BRIDGET     Pretreatment Heart Rate (beats/min) 90  -BRIDGET     O2 Delivery Pre Treatment room air  -BRIDGET     O2 Delivery Intra Treatment room air  -BRIDGET     O2 Delivery Post Treatment room air  -BRIDGET     Pre Patient Position Supine  -BRIDGET     Intra Patient Position Standing  -BRIDGET     Post Patient Position Supine  -BRIDGET       Row Name 06/23/24 0942          Positioning and Restraints    Pre-Treatment Position in bed  -BRIDGET     Post Treatment Position bed  -BRIDGET     In Bed notified nsg;sitting;call light within reach;encouraged to call for assist  -BRIDGET               User Key  (r) = Recorded By, (t) = Taken By, (c) = Cosigned By       Initials Name Provider Type    Kelly Hale, OLGA Physical Therapist                   Outcome Measures       Row Name 06/23/24 0944          How much help from another person do you currently need...    Turning from your back to your side while in flat bed without using bedrails? 4  -BRIDGET     Moving from lying on back to sitting on the side of a flat bed without bedrails? 4  -BRIDGET     Moving to and from a bed to a chair (including a wheelchair)? 4  -BRIDGET     Standing up from a chair using your arms (e.g., wheelchair, bedside chair)? 4  -BRIDGET     Climbing 3-5 steps with a railing? 4  -BRIDGET     To walk in hospital room? 4  -BRIDGET     AM-PAC 6 Clicks Score (PT) 24  -BRIDGET     Highest Level of Mobility Goal 8 --> Walked 250 feet or more  -BRIDGET       Row Name 06/23/24 0944          Modified [place] Scale    Pre-Stroke Modified Zach Scale 0 - No Symptoms at all.  -BRIDGET     Modified Zach Scale 1 - No significant disability despite symptoms.  Able to carry out all usual duties and activities.  -BRIDGET       Row Name 06/23/24 0944          Functional Assessment    Outcome Measure Options AM-PAC 6 Clicks Basic Mobility (PT)  -BRIDGET               User Key  (r) = Recorded By, (t) = Taken By, (c) = Cosigned By      Initials Name Provider Type    Kelly Hale PT Physical Therapist                  Physical Therapy Education       Title: PT OT SLP Therapies (In Progress)       Topic: Physical Therapy (In Progress)       Point: Mobility training (In Progress)       Learning Progress Summary             Patient Acceptance, E, NR by BRIDGET at 6/23/2024 0945                         Point: Home exercise program (Not Started)       Learner Progress:  Not documented in this visit.              Point: Body mechanics (In Progress)       Learning Progress Summary             Patient Acceptance, E, NR by BRIDGET at 6/23/2024 0945                         Point: Precautions (In Progress)       Learning Progress Summary             Patient Acceptance, E, NR  by BRIDGET at 6/23/2024 0945                                         User Key       Initials Effective Dates Name Provider Type Discipline    BRIDGET 06/16/21 -  Kelly Jeronimo PT Physical Therapist PT                  PT Recommendation and Plan     Plan of Care Reviewed With: patient  Progress: no change  Outcome Evaluation: Pt presents with mild sensory deficits of L UE and L LUE, at baseline of independence with transfers and gait. No skilled needs at this time. DC home.     Time Calculation:   PT Evaluation Complexity  History, PT Evaluation Complexity: no personal factors and/or comorbidities  Examination of Body Systems (PT Eval Complexity): 1-2 elements  Clinical Presentation (PT Evaluation Complexity): stable  Clinical Decision Making (PT Evaluation Complexity): low complexity  Overall Complexity (PT Evaluation Complexity): low complexity     PT Charges       Row Name 06/23/24 0946             Time Calculation    Start Time 0945  -BRIDGET      PT Received On 06/23/24  -BRIDGET         Untimed Charges    PT Eval/Re-eval Minutes 40  -BRIDGET         Total Minutes    Untimed Charges Total Minutes 40  -BRIDGET       Total Minutes 40  -BRIDGET                User Key  (r) = Recorded By, (t) = Taken By, (c) = Cosigned By      Initials Name Provider Type    Kelly Hale PT Physical Therapist                  Therapy Charges for Today       Code Description Service Date Service Provider Modifiers Qty    [id number]  PT EVAL LOW COMPLEXITY 3 6/23/2024 Kelly Jeronimo, PT GP 1            PT G-Codes  Outcome Measure Options: AM-PAC 6 Clicks Basic Mobility (PT)  AM-PAC 6 Clicks Score (PT): 24  AM-PAC 6 Clicks Score (OT): 24  Modified [place] Scale: 1 - No significant disability despite symptoms.  Able to carry out all usual duties and activities.    PT Discharge Summary  Anticipated Discharge Disposition (PT): home    Kelly Jeronimo PT  6/23/2024

## 2024-06-23 NOTE — OUTREACH NOTE
Prep Survey      Flowsheet Row Responses   [facility] patient discharged from? [place]   Is LACE score < 7 ? Yes   Eligibility [facility]   Date of Admission 06/22/24   Date of Discharge 06/23/24   Discharge Disposition Home or Self Care   Discharge diagnosis Left-sided weakness   Does the patient have one of the following disease processes/diagnoses(primary or secondary)? Other   Does the patient have Home health ordered? No   Is there a DME ordered? No   Prep survey completed? Yes            Hermila MONROY - Registered Nurse

## 2024-06-23 NOTE — PLAN OF CARE
Goal Outcome Evaluation:  Plan of Care Reviewed With: patient        Progress: no change  Outcome Evaluation: Pt presents with mild sensory deficits of L UE and L LUE, at baseline of independence with transfers and gait. No skilled needs at this time. DC home.      Anticipated Discharge Disposition (PT): home

## 2024-06-23 NOTE — NURSING NOTE
Pt Axo, VSS, no complaints of pain, on room air. Discharge teaching completed with pt, all questions answered at this time. Telebox cleaned and returned to assigned location.

## 2024-06-24 ENCOUNTER — TRANSITIONAL CARE MANAGEMENT TELEPHONE ENCOUNTER (OUTPATIENT)
Dept: CALL CENTER | Facility: HOSPITAL | Age: 54
End: 2024-06-24
Payer: COMMERCIAL

## 2024-06-24 NOTE — OUTREACH NOTE
Call Center TCM Note      Flowsheet Row Responses   [facility] patient discharged from? [place]   Does the patient have one of the following disease processes/diagnoses(primary or secondary)? Other   TCM attempt successful? No   Unsuccessful attempts Attempt 1  [Attempted to reach patient and son Melody Holland, listed on PCP verbal release. NO answer]            Ramona Troncoso RN    6/24/2024, 08:49 EDT

## 2024-06-24 NOTE — OUTREACH NOTE
Call Center TCM Note      Flowsheet Row Responses   [facility] patient discharged from? [place]   Does the patient have one of the following disease processes/diagnoses(primary or secondary)? Other   TCM attempt successful? Yes   Call start time 1131   Call end time 1135   Discharge diagnosis Left-sided weakness   (MRI negative for acute infarct)   Person spoke with today (if not patient) and relationship Patient   Meds reviewed with patient/caregiver? Yes   Does the patient have all medications ordered at discharge? Yes   Is the patient taking all medications as directed (includes completed medication regime)? Yes   Comments PCP Samantha MOAHN. Hospital follow up scheduled for 7/2  115pm with PCP   Does the patient have an appointment with their PCP within 7-14 days of discharge? No   Nursing Interventions Assisted patient with making appointment per protocol, Routed TCM call to PCP office   Has home health visited the patient within 72 hours of discharge? N/A   Psychosocial issues? No   Did the patient receive a copy of their discharge instructions? Yes   Nursing interventions Reviewed instructions with patient   What is the patient's perception of their health status since discharge? Returned to baseline/stable  [Patient reports that he is back to normal]   Is the patient/caregiver able to teach back signs and symptoms related to disease process for when to call PCP? Yes   Is the patient/caregiver able to teach back signs and symptoms related to disease process for when to call 911? Yes   Is the patient/caregiver able to teach back the hierarchy of who to call/visit for symptoms/problems? PCP, Specialist, Home health nurse, Urgent Care, ED, 911 Yes   If the patient is a current smoker, are they able to teach back resources for cessation? Not a smoker   TCM call completed? Yes   Call end time 1135   Would this patient benefit from a Referral to Amb Social Work? No   Is the patient interested in  additional calls from an ambulatory ? No            Ramona Troncoso RN    6/24/2024, 11:36 EDT

## 2024-07-02 ENCOUNTER — OFFICE VISIT (OUTPATIENT)
Dept: INTERNAL MEDICINE | Facility: CLINIC | Age: 54
End: 2024-07-02
Payer: COMMERCIAL

## 2024-07-02 VITALS
OXYGEN SATURATION: 96 % | WEIGHT: 212 LBS | HEART RATE: 96 BPM | SYSTOLIC BLOOD PRESSURE: 124 MMHG | DIASTOLIC BLOOD PRESSURE: 68 MMHG | HEIGHT: 66 IN | RESPIRATION RATE: 18 BRPM | TEMPERATURE: 98.2 F | BODY MASS INDEX: 34.07 KG/M2

## 2024-07-02 DIAGNOSIS — I10 ESSENTIAL HYPERTENSION: ICD-10-CM

## 2024-07-02 DIAGNOSIS — E78.2 MIXED HYPERLIPIDEMIA: ICD-10-CM

## 2024-07-02 DIAGNOSIS — R53.1 LEFT-SIDED WEAKNESS: ICD-10-CM

## 2024-07-02 DIAGNOSIS — R73.03 PREDIABETES: ICD-10-CM

## 2024-07-02 DIAGNOSIS — G45.9 TIA (TRANSIENT ISCHEMIC ATTACK): Primary | ICD-10-CM

## 2024-07-02 PROCEDURE — 99214 OFFICE O/P EST MOD 30 MIN: CPT | Performed by: NURSE PRACTITIONER

## 2024-07-02 PROCEDURE — 3078F DIAST BP <80 MM HG: CPT | Performed by: NURSE PRACTITIONER

## 2024-07-02 PROCEDURE — 1160F RVW MEDS BY RX/DR IN RCRD: CPT | Performed by: NURSE PRACTITIONER

## 2024-07-02 PROCEDURE — 3074F SYST BP LT 130 MM HG: CPT | Performed by: NURSE PRACTITIONER

## 2024-07-02 PROCEDURE — 1159F MED LIST DOCD IN RCRD: CPT | Performed by: NURSE PRACTITIONER

## 2024-07-02 PROCEDURE — 1126F AMNT PAIN NOTED NONE PRSNT: CPT | Performed by: NURSE PRACTITIONER

## 2024-07-02 NOTE — PROGRESS NOTES
Transitional Care Follow Up Visit  Subjective     Demetria MACK Elizabeth is a 54 y.o. male who presents for a transitional care management visit.    Within 48 business hours after discharge our office contacted him via telephone to coordinate his care and needs.      I reviewed and discussed the details of that call along with the discharge summary, hospital problems, inpatient lab results, inpatient diagnostic studies, and consultation reports with Demetria.     Current outpatient and discharge medications have been reconciled for the patient.  Reviewed by: KIMBERLEE Devine          6/23/2024     5:46 PM   Date of TCM Phone Call   Hospital [facility]   Date of Admission 6/22/2024   Date of Discharge 6/23/2024   Discharge Disposition Home or Self Care     Risk for Readmission (LACE) No data recorded      History of Present Illness /Review of Systems  Course During Hospital Stay:     History of Present Illness  The patient is a 54-year-old male who is here for a transitional care management visit from hospitalization at [facility] from 06/22/2024 through 06/23/2024. He presented with complaints of left upper extremity weakness and numbness starting the previous night and recurrent that morning. Motor symptoms did improve, but paresthesias persisted. He was admitted for a stroke work-up. MRI was negative for an acute infarct stroke. Neurology suspected a TIA and recommended DAPT for 21 days followed by aspirin monotherapy. He does instantly have a history of hypertension, hyperlipidemia, and GERD. He has no history of tobacco abuse. He has known hyperlipidemia. He is on atorvastatin 80 mg. Dosing was escalated during his hospitalization from 40 to 80 mg. He had a hemoglobin A1c on 06/23/2024 which demonstrated elevated A1c at 6 percent, which is down from 3 months ago where it was 6.2 percent. He is not on any antidiabetic agents and has never been diagnosed with type 2 diabetes,  but instead just prediabetes. All of his routine medications were continued other than the increase in the atorvastatin and the addition of the clopidogrel temporarily.    The patient reports that he was numbness and tingling sensations in his left leg.  This did resolve.  Despite the absence of a stroke, he was advised to follow up with the neurology stroke team outpatient next month. He has been asymptomatic since his discharge from the hospital. He denies experiencing chest pain, shortness of breath, numbness, tingling, or weakness.  He reports he feels back to baseline today.    Results  Laboratory Studies  LDL was stable at 75. Hemoglobin A1c at 6%.    Imaging  MRI was negative for an acute infarct stroke. Transthoracic echocardiogram showed preserved EF with negative bubble study.       The following portions of the patient's history were reviewed and updated as appropriate: allergies, current medications, past family history, past medical history, past social history, past surgical history, and problem list.        Objective   Physical Exam  Vitals and nursing note reviewed.   Constitutional:       Appearance: Normal appearance. He is well-developed.   HENT:      Head: Normocephalic and atraumatic.   Eyes:      Conjunctiva/sclera: Conjunctivae normal.   Cardiovascular:      Rate and Rhythm: Normal rate and regular rhythm.      Heart sounds: Normal heart sounds.   Pulmonary:      Effort: Pulmonary effort is normal. No respiratory distress.      Breath sounds: Normal breath sounds.   Abdominal:      General: Bowel sounds are normal. There is no distension.      Palpations: Abdomen is soft.      Tenderness: There is no abdominal tenderness.   Musculoskeletal:      Cervical back: Normal range of motion.   Skin:     General: Skin is warm and dry.   Neurological:      Mental Status: He is alert and oriented to person, place, and time.      Cranial Nerves: Cranial nerves 2-12 are intact.      Motor: Motor function  is intact.      Coordination: Coordination is intact.      Gait: Gait is intact.   Psychiatric:         Speech: Speech normal.         Behavior: Behavior normal.         Thought Content: Thought content normal.         Judgment: Judgment normal.           Assessment & Plan   Diagnoses and all orders for this visit:    1. TIA (transient ischemic attack) (Primary)    2. Mixed hyperlipidemia    3. Essential hypertension    4. Prediabetes    5. Left-sided weakness-resolved        Assessment & Plan  During his hospitalization he was negative for CVA.  Likely was TIA.  Agree with DAPT for 21 days followed by aspirin monotherapy.  Will also go ahead and continue him on high-dose atorvastatin at 80 mg.  His hypertension is well-controlled with current regimen plan to continue this as is with goal BP of less than 130/80 consistently.  A1c was in prediabetic range so we will plan to follow-up in 3 months with a repeat A1c.  Encouraged ADA diet and increase physical activity as tolerated.        Follow-up  A follow-up appointment is scheduled for 3 months from now.             Patient or patient representative verbalized consent for the use of Ambient Listening during the visit with  KIMBERLEE Sutherland for chart documentation. 7/10/2024  07:53 EDT

## 2024-08-01 ENCOUNTER — OFFICE VISIT (OUTPATIENT)
Dept: NEUROLOGY | Facility: CLINIC | Age: 54
End: 2024-08-01
Payer: COMMERCIAL

## 2024-08-01 VITALS
HEIGHT: 66 IN | BODY MASS INDEX: 34.07 KG/M2 | SYSTOLIC BLOOD PRESSURE: 126 MMHG | OXYGEN SATURATION: 92 % | TEMPERATURE: 97.6 F | HEART RATE: 84 BPM | WEIGHT: 212 LBS | DIASTOLIC BLOOD PRESSURE: 84 MMHG

## 2024-08-01 DIAGNOSIS — I10 ESSENTIAL HYPERTENSION: ICD-10-CM

## 2024-08-01 DIAGNOSIS — R73.03 PREDIABETES: ICD-10-CM

## 2024-08-01 DIAGNOSIS — I63.9 STROKE DETERMINED BY CLINICAL ASSESSMENT: Primary | ICD-10-CM

## 2024-08-01 RX ORDER — FENOFIBRATE 145 MG/1
TABLET, COATED ORAL
COMMUNITY
Start: 2024-07-03

## 2024-08-01 NOTE — PROGRESS NOTES
New Patient Office Visit      Encounter Date: 2024   Patient Name: Demetria Johnson  : 1970   MRN: [id number]   PCP: Samantha Saravia APRN    Referring Provider: KIMBERLEE Gonsales     Chief Complaint:    Chief Complaint   Patient presents with    Follow-up       History of Present Illness: Demetria Johnson is a 54 y.o. male who is here today to establish care.  Patient has known medical history significant for hypertension and hyperlipidemia.  He presented to [facility] ED on 2024 with left sided numbness and weakness.  Initial NIH was a 1.  Acute stroke imaging including CT head, CTA head and neck and CT perfusion were unremarkable.  Ultimately MRI was negative for stroke.  Echocardiogram was performed and negative for cardiac source.  Ultimately patient was diagnosed with a TIA likely due to small vessel disease.  Discharged on DAPT x 21 days followed by aspirin monotherapy.    Clinic visit 2024: Patient presents to clinic for routine follow-up.  He reports that he has had ongoing intermittent paresthesias of the left arm and face/scalp.  No new or worsening strokelike symptoms.  Discussed with patient that due to his ongoing symptoms it is likely that rather than a TIA he is experienced a very small ischemic stroke in the right hemisphere that may not have been visualized on MRI.  Either way the treatment plan will not change.  We need to keep him on an aspirin indefinitely as well as statin as a statin.  Discussed importance of lifestyle modification and addressing all vascular risk factors.  We did discuss completing a Holter monitor to evaluate for any chance of PAF which is less likely.  Patient would like to defer at this time.  Encourage patient to be compliant with his CPAP.  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    Stroke Risk Factors: hyperlipidemia, hypertension, and ANU      Subjective      Review of Systems:   Review of Systems    Constitutional:  Negative for activity change, appetite change, chills, diaphoresis, fatigue, fever, unexpected weight gain and unexpected weight loss.   Eyes:  Negative for blurred vision, double vision, photophobia and visual disturbance.   Respiratory:  Negative for cough and shortness of breath.    Cardiovascular:  Negative for chest pain, palpitations and leg swelling.   Gastrointestinal:  Negative for blood in stool, nausea and vomiting.   Genitourinary:  Negative for hematuria.   Musculoskeletal:  Negative for gait problem.   Neurological:  Positive for numbness. Negative for dizziness, tremors, seizures, syncope, facial asymmetry, speech difficulty, weakness, light-headedness, headache, memory problem and confusion.       Past Medical History:   Past Medical History:   Diagnosis Date    Allergies        Past Surgical History: History reviewed. No pertinent surgical history.    Family History: History reviewed. No pertinent family history.    Social History:   Social History     Socioeconomic History    Marital status:    Tobacco Use    Smoking status: Never     Passive exposure: Never    Smokeless tobacco: Never   Vaping Use    Vaping status: Never Used   Substance and Sexual Activity    Alcohol use: Never    Drug use: Never    Sexual activity: Defer       Medications:     Current Outpatient Medications:     amLODIPine (NORVASC) 10 MG tablet, Take 1 tablet by mouth Daily., Disp: 90 tablet, Rfl: 1    aspirin (aspirin) 81 MG EC tablet, Take 1 tablet by mouth Daily., Disp: 90 tablet, Rfl: 1    atorvastatin (LIPITOR) 80 MG tablet, Take 1 tablet by mouth Every Night., Disp: 90 tablet, Rfl: 0    Azelastine HCl 137 MCG/SPRAY solution, , Disp: , Rfl:     Dupixent 300 MG/2ML solution prefilled syringe, , Disp: , Rfl:     Dymista 137-50 MCG/ACT suspension, , Disp: , Rfl:     fenofibrate (TRICOR) 145 MG tablet, , Disp: , Rfl:     fluorometholone (FML) 0.1 % ophthalmic suspension, , Disp: , Rfl:     Lifitegrast  "(XIIDRA) 5 % ophthalmic solution, Apply 1 drop to eye(s) as directed by provider., Disp: , Rfl:     lisinopril (PRINIVIL,ZESTRIL) 10 MG tablet, Take 1 tablet by mouth Daily., Disp: 90 tablet, Rfl: 1    loratadine (CLARITIN) 10 MG tablet, Take 1 tablet by mouth Daily., Disp: , Rfl:     Symbicort 160-4.5 MCG/ACT inhaler, , Disp: , Rfl:     Ventolin  (90 Base) MCG/ACT inhaler, , Disp: , Rfl:     nystatin-triamcinolone (MYCOLOG) 021742-9.1 UNIT/GM-% ointment, Apply  topically to the appropriate area as directed 2 (Two) Times a Day. (Patient not taking: Reported on 8/1/2024), Disp: 60 g, Rfl: 1    pantoprazole (PROTONIX) 40 MG EC tablet, Take 1 tablet by mouth Every Morning Before Breakfast. (Patient not taking: Reported on 8/1/2024), Disp: 90 tablet, Rfl: 1    Restasis 0.05 % ophthalmic emulsion, , Disp: , Rfl:     Allergies:   No Known Allergies    Objective     Physical Exam:  Vital Signs:   Vitals:    08/01/24 0851   BP: 126/84   Pulse: 84   Temp: 97.6 °F (36.4 °C)   SpO2: 92%   Weight: 96.2 kg (212 lb)   Height: 167.6 cm (65.98\")     Body mass index is 34.23 kg/m².     Physical Exam  Vitals and nursing note reviewed.   Constitutional:       General: He is not in acute distress.     Appearance: Normal appearance. He is obese. He is not ill-appearing.      Comments: 54-year-old male   HENT:      Head: Normocephalic and atraumatic.      Nose: Nose normal.      Mouth/Throat:      Mouth: Mucous membranes are moist.   Eyes:      Extraocular Movements: Extraocular movements intact.      Pupils: Pupils are equal, round, and reactive to light.   Cardiovascular:      Rate and Rhythm: Normal rate and regular rhythm.      Pulses: Normal pulses.   Pulmonary:      Effort: Pulmonary effort is normal. No respiratory distress.   Skin:     General: Skin is warm and dry.   Neurological:      Mental Status: He is alert and oriented to person, place, and time.      Sensory: Sensory deficit (Intermittent paresthesias left face and " arm) present.   Psychiatric:         Mood and Affect: Mood normal.         Behavior: Behavior normal.         NIH 1-sensory     Modified [place] Score: 1        0  No Symptoms    1 No significant disability. Able to carry out all usual activities, despite some symptoms.    2 Slight disability. Able to look after own affairs without assistance, but unable to carry out all previous activities.    3 Moderate disability. Requires some help, but able to walk unassisted.    4 Moderately severe disability. Unable to attend to own bodily needs without assistance, and unable to walk unassisted.    5 Severe disability. Requires constant nursing care and attention, bedridden, incontinent.    6 Dead       PHQ-9 Depression Screening  Little interest or pleasure in doing things? 0-->not at all   Feeling down, depressed, or hopeless? 0-->not at all   Trouble falling or staying asleep, or sleeping too much?     Feeling tired or having little energy?     Poor appetite or overeating?     Feeling bad about yourself - or that you are a failure or have let yourself or your family down?     Trouble concentrating on things, such as reading the newspaper or watching television?     Moving or speaking so slowly that other people could have noticed? Or the opposite - being so fidgety or restless that you have been moving around a lot more than usual?     Thoughts that you would be better off dead, or of hurting yourself in some way?     PHQ-9 Total Score 0   If you checked off any problems, how difficult have these problems made it for you to do your work, take care of things at home, or get along with other people?            STOP-Bang Score  Have you been diagnosed with Sleep Apnea?: yes (pt wears a CPAP)       SAURAV Fall Risk Clinician Key Questions   Have you fallen in the past year?: No  Do you feel unsteady with walking?: No  Are you worried about falling?: No      Imaging Reviewed:   MRI Brain Without Contrast    Result Date:  6/23/2024  Impression: 1. No acute MRI findings. Specifically no findings to suggest a recent infarct. 2. Fairly extensive sinus disease without air-fluid level. Consider outpatient ENT follow-up. Electronically Signed: Ramiro Ruffin MD  6/23/2024 8:24 AM EDT  Workstation ID: [id number]    XR Chest 1 View    Result Date: 6/22/2024  Impression: No acute cardiopulmonary process. Electronically Signed: Kaye Mcfarland MD  6/22/2024 7:07 AM EDT  Workstation ID: [id number]    CT Angiogram Head w AI Analysis of LVO    Result Date: 6/22/2024  Impression: No evidence of hemodynamically significant stenosis, AVM or aneurysm within the head or neck. No large vessel occlusion identified. No acute process. Electronically Signed: Kaye Mcfarland MD  6/22/2024 6:57 AM EDT  Workstation ID: [id number]    CT Angiogram Neck    Result Date: 6/22/2024  Impression: No evidence of hemodynamically significant stenosis, AVM or aneurysm within the head or neck. No large vessel occlusion identified. No acute process. Electronically Signed: Kaye Mcfarland MD  6/22/2024 6:57 AM EDT  Workstation ID: [id number]    CT CEREBRAL PERFUSION WITH & WITHOUT CONTRAST    Result Date: 6/22/2024  Impression: No evidence of core infarct or ischemic penumbra. Electronically Signed: Kaye Mcfarland MD  6/22/2024 6:56 AM EDT  Workstation ID: [id number]    CT Head Without Contrast Stroke Protocol    Result Date: 6/22/2024  Impression: No evidence of hemorrhage, mass effect or midline shift. No acute process identified. Electronically Signed: Kaye Mcfarland MD  6/22/2024 6:24 AM EDT  Workstation ID: [id number]      Laboratory Results:   Lab Results   Component Value Date    GLUCOSE 113 (H) 06/23/2024    BUN 14 06/23/2024    CREATININE 0.94 06/23/2024    EGFR 96.9 06/23/2024    BCR 14.9 06/23/2024    K 4.3 06/23/2024    CO2 24.0 06/23/2024    CALCIUM 8.9 06/23/2024    ALBUMIN 3.8 06/23/2024    BILITOT 0.3 06/23/2024    AST 17 06/23/2024    ALT 19 06/23/2024     Lipid Panel           3/25/2024    08:41 2024    04:34   Lipid Panel   Total Cholesterol 219  133    Triglycerides 520  115    HDL Cholesterol 33  37    VLDL Cholesterol 88  21    LDL Cholesterol  98  75    LDL/HDL Ratio 2.48  1.97       Most Recent A1C          2024    04:34   HGBA1C Most Recent   Hemoglobin A1C 6.00            Assessment / Plan      Assessment/Plan:   # Intermittent left sided paresthesias. TIA vs small right hemisphere ischemic stroke not seen on MRI   -No diffusion restriction on MRI to indicate acute infarct \however patient reports continued symptoms in the left arm and face with varying intensity   -ABCD2 score 5; moderate risk with a 90-day stroke risk 9.8%  -Continue DAPT x 21 days followed by aspirin monotherapy. Patient has already discontinued Plavix and is being maintained on aspirin 81 mg daily.   -continue Lipitor 80 mg nightly   -TTE shows EF of 56 to 60%, normal left atrium, negative bubble study  -Patient would like to defer holter monitor at this time which I feel is reasonable as it is likely  related event with HTN, HLD, pre-diabetes, ANU. If patient reports palpations in future will recommend holter  -HTN normal blood pressure parameters, <130/80. Today BP is 126/84, well controlled.   -HLD: Continue atorvastatin 80 mg nightly; LDL 75, goal <70  -Patient has sleep apnea and is compliant with his CPAP.  -Reviewed signs and symptoms of stroke and when to call 911  -Follow-up in the stroke clinic in 6 months     Discussed the importance of medication compliance and lifestyle modifications (adequate blood pressure control, adequate control of hyperlipidemia, adequate glycemic control, increase physical activity, and healthy diet) to help reduce the risk of future cerebrovascular events.  Also discussed the signs symptoms that would warrant the patient return back to the emergency department including unilateral weakness, unilateral numbness, visual disturbances, loss of balance, speech  difficulties, and/or a sudden severe headache.      Follow Up:   Return in about 6 months (around 2/1/2025).    KIMBERLEE Guzman  [facility]

## 2024-08-19 ENCOUNTER — APPOINTMENT (OUTPATIENT)
Dept: MRI IMAGING | Facility: HOSPITAL | Age: 54
End: 2024-08-19
Payer: COMMERCIAL

## 2024-08-19 ENCOUNTER — HOSPITAL ENCOUNTER (EMERGENCY)
Facility: HOSPITAL | Age: 54
Discharge: HOME | End: 2024-08-19
Attending: EMERGENCY MEDICINE | Admitting: EMERGENCY MEDICINE
Payer: COMMERCIAL

## 2024-08-19 ENCOUNTER — APPOINTMENT (OUTPATIENT)
Dept: GENERAL RADIOLOGY | Facility: HOSPITAL | Age: 54
End: 2024-08-19
Payer: COMMERCIAL

## 2024-08-19 VITALS
TEMPERATURE: 97.8 F | OXYGEN SATURATION: 95 % | RESPIRATION RATE: 16 BRPM | DIASTOLIC BLOOD PRESSURE: 87 MMHG | HEART RATE: 88 BPM | BODY MASS INDEX: 36.8 KG/M2 | HEIGHT: 62 IN | WEIGHT: 200 LBS | SYSTOLIC BLOOD PRESSURE: 132 MMHG

## 2024-08-19 DIAGNOSIS — R20.0 NUMBNESS AND TINGLING OF LEFT LEG: ICD-10-CM

## 2024-08-19 DIAGNOSIS — R20.2 NUMBNESS AND TINGLING IN LEFT ARM: Primary | ICD-10-CM

## 2024-08-19 DIAGNOSIS — R20.2 NUMBNESS AND TINGLING OF LEFT LEG: ICD-10-CM

## 2024-08-19 DIAGNOSIS — R20.0 NUMBNESS AND TINGLING IN LEFT ARM: Primary | ICD-10-CM

## 2024-08-19 LAB
ALBUMIN SERPL-MCNC: 4.1 G/DL (ref 3.5–5.2)
ALBUMIN/GLOB SERPL: 1.4 G/DL
ALP SERPL-CCNC: 118 U/L (ref 39–117)
ALT SERPL W P-5'-P-CCNC: 24 U/L (ref 1–41)
AMPHET+METHAMPHET UR QL: NEGATIVE
AMPHETAMINES UR QL: NEGATIVE
ANION GAP SERPL CALCULATED.3IONS-SCNC: 10 MMOL/L (ref 5–15)
AST SERPL-CCNC: 18 U/L (ref 1–40)
BARBITURATES UR QL SCN: NEGATIVE
BASOPHILS # BLD AUTO: 0.07 10*3/MM3 (ref 0–0.2)
BASOPHILS NFR BLD AUTO: 0.8 % (ref 0–1.5)
BENZODIAZ UR QL SCN: NEGATIVE
BILIRUB SERPL-MCNC: 0.3 MG/DL (ref 0–1.2)
BILIRUB UR QL STRIP: NEGATIVE
BUN SERPL-MCNC: 17 MG/DL (ref 6–20)
BUN/CREAT SERPL: 16.5 (ref 7–25)
BUPRENORPHINE SERPL-MCNC: NEGATIVE NG/ML
CALCIUM SPEC-SCNC: 8.6 MG/DL (ref 8.6–10.5)
CANNABINOIDS SERPL QL: NEGATIVE
CHLORIDE SERPL-SCNC: 104 MMOL/L (ref 98–107)
CLARITY UR: CLEAR
CO2 SERPL-SCNC: 25 MMOL/L (ref 22–29)
COCAINE UR QL: NEGATIVE
COLOR UR: YELLOW
CREAT SERPL-MCNC: 1.03 MG/DL (ref 0.76–1.27)
DEPRECATED RDW RBC AUTO: 43.3 FL (ref 37–54)
EGFRCR SERPLBLD CKD-EPI 2021: 86.9 ML/MIN/1.73
EOSINOPHIL # BLD AUTO: 0.32 10*3/MM3 (ref 0–0.4)
EOSINOPHIL NFR BLD AUTO: 3.5 % (ref 0.3–6.2)
ERYTHROCYTE [DISTWIDTH] IN BLOOD BY AUTOMATED COUNT: 13.5 % (ref 12.3–15.4)
FENTANYL UR-MCNC: NEGATIVE NG/ML
GLOBULIN UR ELPH-MCNC: 2.9 GM/DL
GLUCOSE SERPL-MCNC: 124 MG/DL (ref 65–99)
GLUCOSE UR STRIP-MCNC: NEGATIVE MG/DL
HCT VFR BLD AUTO: 42.4 % (ref 37.5–51)
HGB BLD-MCNC: 13.9 G/DL (ref 13–17.7)
HGB UR QL STRIP.AUTO: NEGATIVE
IMM GRANULOCYTES # BLD AUTO: 0.03 10*3/MM3 (ref 0–0.05)
IMM GRANULOCYTES NFR BLD AUTO: 0.3 % (ref 0–0.5)
KETONES UR QL STRIP: NEGATIVE
LEUKOCYTE ESTERASE UR QL STRIP.AUTO: NEGATIVE
LYMPHOCYTES # BLD AUTO: 3.14 10*3/MM3 (ref 0.7–3.1)
LYMPHOCYTES NFR BLD AUTO: 34.3 % (ref 19.6–45.3)
MAGNESIUM SERPL-MCNC: 2.1 MG/DL (ref 1.6–2.6)
MCH RBC QN AUTO: 28.7 PG (ref 26.6–33)
MCHC RBC AUTO-ENTMCNC: 32.8 G/DL (ref 31.5–35.7)
MCV RBC AUTO: 87.6 FL (ref 79–97)
METHADONE UR QL SCN: NEGATIVE
MONOCYTES # BLD AUTO: 0.8 10*3/MM3 (ref 0.1–0.9)
MONOCYTES NFR BLD AUTO: 8.7 % (ref 5–12)
NEUTROPHILS NFR BLD AUTO: 4.8 10*3/MM3 (ref 1.7–7)
NEUTROPHILS NFR BLD AUTO: 52.4 % (ref 42.7–76)
NITRITE UR QL STRIP: NEGATIVE
NRBC BLD AUTO-RTO: 0 /100 WBC (ref 0–0.2)
OPIATES UR QL: NEGATIVE
OXYCODONE UR QL SCN: NEGATIVE
PCP UR QL SCN: NEGATIVE
PH UR STRIP.AUTO: 6.5 [PH] (ref 5–8)
PLATELET # BLD AUTO: 217 10*3/MM3 (ref 140–450)
PMV BLD AUTO: 10.6 FL (ref 6–12)
POTASSIUM SERPL-SCNC: 3.8 MMOL/L (ref 3.5–5.2)
PROT SERPL-MCNC: 7 G/DL (ref 6–8.5)
PROT UR QL STRIP: NEGATIVE
QT INTERVAL: 358 MS
QTC INTERVAL: 423 MS
RBC # BLD AUTO: 4.84 10*6/MM3 (ref 4.14–5.8)
SODIUM SERPL-SCNC: 139 MMOL/L (ref 136–145)
SP GR UR STRIP: 1.01 (ref 1–1.03)
TRICYCLICS UR QL SCN: NEGATIVE
TSH SERPL DL<=0.05 MIU/L-ACNC: 1.77 UIU/ML (ref 0.27–4.2)
UROBILINOGEN UR QL STRIP: NORMAL
WBC NRBC COR # BLD AUTO: 9.16 10*3/MM3 (ref 3.4–10.8)

## 2024-08-19 PROCEDURE — A9577 INJ MULTIHANCE: HCPCS | Performed by: EMERGENCY MEDICINE

## 2024-08-19 PROCEDURE — 80053 COMPREHEN METABOLIC PANEL: CPT | Performed by: EMERGENCY MEDICINE

## 2024-08-19 PROCEDURE — 0 GADOBENATE DIMEGLUMINE 529 MG/ML SOLUTION: Performed by: EMERGENCY MEDICINE

## 2024-08-19 PROCEDURE — 99283 EMERGENCY DEPT VISIT LOW MDM: CPT

## 2024-08-19 PROCEDURE — 93005 ELECTROCARDIOGRAM TRACING: CPT | Performed by: EMERGENCY MEDICINE

## 2024-08-19 PROCEDURE — 84443 ASSAY THYROID STIM HORMONE: CPT | Performed by: EMERGENCY MEDICINE

## 2024-08-19 PROCEDURE — 83735 ASSAY OF MAGNESIUM: CPT | Performed by: EMERGENCY MEDICINE

## 2024-08-19 PROCEDURE — 80307 DRUG TEST PRSMV CHEM ANLYZR: CPT | Performed by: EMERGENCY MEDICINE

## 2024-08-19 PROCEDURE — 70553 MRI BRAIN STEM W/O & W/DYE: CPT

## 2024-08-19 PROCEDURE — 71045 X-RAY EXAM CHEST 1 VIEW: CPT

## 2024-08-19 PROCEDURE — 81003 URINALYSIS AUTO W/O SCOPE: CPT | Performed by: EMERGENCY MEDICINE

## 2024-08-19 PROCEDURE — 99285 EMERGENCY DEPT VISIT HI MDM: CPT

## 2024-08-19 PROCEDURE — 85025 COMPLETE CBC W/AUTO DIFF WBC: CPT | Performed by: EMERGENCY MEDICINE

## 2024-08-19 RX ORDER — SODIUM CHLORIDE 0.9 % (FLUSH) 0.9 %
10 SYRINGE (ML) INJECTION AS NEEDED
Status: DISCONTINUED | OUTPATIENT
Start: 2024-08-19 | End: 2024-08-19 | Stop reason: HOSPADM

## 2024-08-19 RX ORDER — CLOPIDOGREL BISULFATE 75 MG/1
300 TABLET ORAL ONCE
Status: COMPLETED | OUTPATIENT
Start: 2024-08-19 | End: 2024-08-19

## 2024-08-19 RX ORDER — CLOPIDOGREL BISULFATE 75 MG/1
75 TABLET ORAL DAILY
Status: DISCONTINUED | OUTPATIENT
Start: 2024-08-20 | End: 2024-08-19 | Stop reason: HOSPADM

## 2024-08-19 RX ADMIN — GADOBENATE DIMEGLUMINE 15 ML: 529 INJECTION, SOLUTION INTRAVENOUS at 07:54

## 2024-08-19 RX ADMIN — CLOPIDOGREL BISULFATE 300 MG: 75 TABLET ORAL at 07:55

## 2024-08-19 NOTE — Clinical Note
[facility] EMERGENCY DEPARTMENT  [address]  [facility] [address]  Phone: [phone]    Demetria Johnson was seen and treated in our emergency department on 8/19/2024.  He may return to work on 08/21/2024.         Thank you for choosing [facility].    Osei Whitaker MD

## 2024-08-19 NOTE — CONSULTS
Stroke Consult Note    Patient Name: Demetria Johnson   MRN: [id number]  Age: 54 y.o.  Sex: male  : 1970    Primary Care Physician: Samantha Saravia APRN  Referring Physician:  Osei Whitaker MD    TIME STROKE TEAM CALLED: 0610 EST     TIME PATIENT SEEN: 0615 EST    Chief Complaint/Reason for Consultation: Left-sided paresthesia    HPI: Demetria Johnson is a 55 yo male with past medical history of hypertension and hyperlipidemia who presents to [facility] ED via private vehicle with complaints of left-sided paresthesias.  Patient is known to [facility] neurology stroke as we have evaluated him in 2024 for similar transient episodes of left-sided paresthesia.  CT head CTA and CTP were unremarkable at that time and MRI was negative.  He was discharged on DAPT x 21 days followed by aspirin monotherapy.  Patient was seen in clinic on  and reported continuing left-sided paresthesias.  At that time we suspected small ischemic stroke that had not been visualized on MRI.  Patient deferred cardiac event monitor at that time for evaluation of PAF.  Currently the patient states that he had an episode of left scalp and left medial leg burning paresthesia with numbness which started yesterday accompanied by paresthesia to the palmar aspect of left index finger radiating up his arm.  Scalp and leg paresthesia have resolved however paresthesia to left hand is continued. This began approximately 1 AM yesterday and is continued over 24 hours.  I examined the patient in the ER in no apparent distress.  NIH currently 1 for left hand paresthesia.  No current weakness.  No headache or dizziness.  No visual disturbance.  Patient has no other current complaints.  Will defer repeat xavier imaging at this time as prior xavier imaging was unrevealing.  Will obtain stat MRI from ED.     Last Known Normal Date/Time: 0100 EST 24    Review of Systems   Constitutional:  Negative for fatigue and fever.   HENT:  Negative for  nosebleeds, sinus pressure, sinus pain and voice change.    Eyes:  Negative for photophobia and visual disturbance.   Respiratory:  Negative for cough and shortness of breath.    Cardiovascular:  Negative for chest pain and palpitations.   Gastrointestinal:  Negative for anal bleeding, nausea and vomiting.   Genitourinary: Negative.    Musculoskeletal: Negative.    Neurological:  Positive for numbness. Negative for dizziness, tremors, seizures, syncope, facial asymmetry, speech difficulty, weakness, light-headedness and headaches.   Hematological: Negative.    Psychiatric/Behavioral: Negative.        Past Medical History:   Diagnosis Date    Allergies      History reviewed. No pertinent surgical history.  History reviewed. No pertinent family history.  Social History     Socioeconomic History    Marital status:    Tobacco Use    Smoking status: Never     Passive exposure: Never    Smokeless tobacco: Never   Vaping Use    Vaping status: Never Used   Substance and Sexual Activity    Alcohol use: Never    Drug use: Never    Sexual activity: Defer     No Known Allergies  Prior to Admission medications    Medication Sig Start Date End Date Taking? Authorizing Provider   amLODIPine (NORVASC) 10 MG tablet Take 1 tablet by mouth Daily. 3/25/24   Samantha Saravia APRN   aspirin (aspirin) 81 MG EC tablet Take 1 tablet by mouth Daily. 5/16/22   Tj Caba MD   atorvastatin (LIPITOR) 80 MG tablet Take 1 tablet by mouth Every Night. 6/23/24   Ld Hernandez DO   Azelastine HCl 137 MCG/SPRAY solution  3/21/24   ProviderCorrine MD   Dupixent 300 MG/2ML solution prefilled syringe  1/4/21   ProviderCorrine MD   Dymista 137-50 MCG/ACT suspension  10/14/22   ProviderCorrine MD   fenofibrate (TRICOR) 145 MG tablet  7/3/24   Provider, MD Corrine   fluorometholone (FML) 0.1 % ophthalmic suspension  2/15/22   Corrine De Anda MD   Lifitegrast (XIIDRA) 5 % ophthalmic solution Apply 1 drop to  eye(s) as directed by provider. 7/23/24 7/23/25  Corrine De Anda MD   lisinopril (PRINIVIL,ZESTRIL) 10 MG tablet Take 1 tablet by mouth Daily. 3/25/24   Samantha Saravia APRN   loratadine (CLARITIN) 10 MG tablet Take 1 tablet by mouth Daily. 10/13/22   Corrine De Anda MD   nystatin-triamcinolone (MYCOLOG) 166342-0.1 UNIT/GM-% ointment Apply  topically to the appropriate area as directed 2 (Two) Times a Day.  Patient not taking: Reported on 8/1/2024 4/15/21   Tj Caba MD   pantoprazole (PROTONIX) 40 MG EC tablet Take 1 tablet by mouth Every Morning Before Breakfast.  Patient not taking: Reported on 8/1/2024 3/25/24   Samantha Saravia APRN   Restasis 0.05 % ophthalmic emulsion  2/27/22   Corrine De Anda MD   Symbicort 160-4.5 MCG/ACT inhaler  4/15/23   Corrine De Anda MD   Ventolin  (90 Base) MCG/ACT inhaler  2/7/23   Corrine De Anda MD         Temp:  [97.8 °F (36.6 °C)] 97.8 °F (36.6 °C)  Heart Rate:  [81-96] 84  Resp:  [16] 16  BP: (131-146)/(80-98) 135/80  Neurological Exam  Mental Status  Alert. Oriented to person, place and time. Oriented to person, place, and time. Speech is normal. Language is fluent with no aphasia.    Cranial Nerves  CN II: Visual fields full to confrontation.  CN III, IV, VI: Extraocular movements intact bilaterally. Normal lids and orbits bilaterally. Pupils equal round and reactive to light bilaterally.  CN V: Facial sensation is normal.  CN VII: Full and symmetric facial movement.  CN XII: Tongue midline without atrophy or fasciculations.    Motor  Normal muscle bulk throughout. No fasciculations present. Normal muscle tone. Strength is 5/5 throughout all four extremities.    Sensory  Light touch abnormality: Reported decreased sensation to touch to palmar aspect of left index finger and radial aspect of left arm.     Coordination  Right: Finger-to-nose normal. Heel-to-shin normal.Left: Finger-to-nose normal. Heel-to-shin  normal.    Gait  Casual gait is normal including stance, stride, and arm swing.      Physical Exam  Constitutional:       General: He is not in acute distress.     Appearance: He is not ill-appearing.   HENT:      Head: Normocephalic and atraumatic.      Mouth/Throat:      Pharynx: Oropharynx is clear.   Eyes:      General: Lids are normal.      Extraocular Movements: Extraocular movements intact.      Pupils: Pupils are equal, round, and reactive to light.   Cardiovascular:      Rate and Rhythm: Normal rate.   Pulmonary:      Effort: Pulmonary effort is normal. No respiratory distress.   Abdominal:      General: There is no distension.      Tenderness: There is no guarding.   Musculoskeletal:      Right lower leg: No edema.      Left lower leg: No edema.   Skin:     General: Skin is warm.   Neurological:      Mental Status: He is alert and oriented to person, place, and time.      Cranial Nerves: No cranial nerve deficit.      Sensory: Sensory deficit present.      Motor: Motor strength is normal.No weakness.      Coordination: Coordination normal.   Psychiatric:         Mood and Affect: Mood normal.         Speech: Speech normal.         Acute Stroke Data    Thrombolytic Inclusion / Exclusion Criteria    Time: 06:57 EDT  Person Administering Scale: Ayan Gonzalez PA-C    Inclusion Criteria  [x]   18 years of age or greater   []   Onset of symptoms < 4.5 hours before beginning treatment (stroke onset = time patient was last seen well or without symptoms).   []   Diagnosis of acute ischemic stroke causing measurable disabling deficit (Complete Hemianopia, Any Aphasia, Visual or Sensory Extinction, Any weakness limiting sustained effort against gravity)   []   Any remaining deficit considered potentially disabling in view of patient and practitioner   Exclusion criteria (Do not proceed with Alteplase if any are checked under exclusion criteria)  [x]   Onset unknown or GREATER than 4.5 hours   []   ICH on CT/MRI   []    CT demonstrates hypodensity representing acute or subacute infarct   []   Significant head trauma or prior stroke in the previous 3 months   []   Symptoms suggestive of subarachnoid hemorrhage   []   History of un-ruptured intracranial aneurysm GREATER than 10 mm   []   Recent intracranial or intraspinal surgery within the last 3 months   []   Arterial puncture at a non-compressible site in the previous 7 days   []   Active internal bleeding   []   Acute bleeding tendency   []   Platelet count LESS than 100,000 for known hematological diseases such as leukemia, thrombocytopenia or chronic cirrhosis   []   Current use of anticoagulant with INR GREATER than 1.7 or PT GREATER than 15 seconds, aPTT GREATER than 40 seconds   []   Heparin received within 48 hours, resulting in abnormally elevated aPTT GREATER than upper limit of normal   []   Current use of direct thrombin inhibitors or direct factor Xa inhibitors in the past 48 hours   []   Elevated blood pressure refractory to treatment (systolic GREATER than 185 mm/Hg or diastolic  GREATER than 110 mm/Hg   []   Suspected infective endocarditis and aortic arch dissection   []   Current use of therapeutic treatment dose of low-molecular-weight heparin (LMWH) within the previous 24 hours   []   Structural GI malignancy or bleed   Relative exclusion for all patients  []   Only minor non-disabling symptoms   []   Pregnancy   []   Seizure at onset with postictal residual neurological impairments   []   Major surgery or previous trauma within past 14 days   []   History of previous spontaneous ICH, intracranial neoplasm, or AV malformation   []   Postpartum (within previous 14 days)   []   Recent GI or urinary tract hemorrhage (within previous 21 days)   []   Recent acute MI (within previous 3 months)   []   History of un-ruptured intracranial aneurysm LESS than 10 mm   []   History of ruptured intracranial aneurysm   []   Blood glucose LESS than 50 mg/dL (2.7 mmol/L)   []    Dural puncture within the last 7 days   []   Known GREATER than 10 cerebral microbleeds   Additional exclusions for patients with symptoms onset between 3 and 4.5 hours.  []   Age > 80.   []   On any anticoagulants regardless of INR  >>> Warfarin (Coumadin), Heparin, Enoxaparin (Lovenox), fondaparinux (Arixtra), bivalirudin (Angiomax), Argatroban, dabigatran (Pradaxa), rivaroxaban (Xarelto), or apixaban (Eliquis)   []   Severe stroke (NIHSS > 25).   []   History of BOTH diabetes and previous ischemic stroke.   []   The risks and benefits have been discussed with the patient or family related to the administration of IV thrombolytic therapy for stroke symptoms.   []   I have discussed and reviewed the patient's case and imaging with the attending prior to IV thrombolytic therapy.   N/A Time IV thrombolytic administered       Hospital Meds:  Scheduled- clopidogrel, 300 mg, Oral, Once  [START ON 8/20/2024] clopidogrel, 75 mg, Oral, Daily      Infusions-     PRNs-   [COMPLETED] Insert Peripheral IV **AND** sodium chloride    Functional Status Prior to Current Stroke/Zach Score:   MODIFIED ZACH SCALE (to be assessed for each patient having history of stroke) []Stroke history but not assessed  []0: No symptoms at all  [x]1: No significant disability despite symptoms  []2: Slight disability  []3: Moderate disability  []4: Moderately severe disability  []5: Severe disability  []6: Death        NIH Stroke Scale  Time: 06:57 EDT  Person Administering Scale: Ayan Gonzalez PA-C    1a  Level of consciousness: 0=alert; keenly responsive   1b. LOC questions:  0=Answers both questions correctly   1c. LOC commands: 0=Performs both tasks correctly   2.  Best Gaze: 0=normal   3.  Visual: 0=No visual loss   4. Facial Palsy: 0=Normal symmetric movement   5a.  Motor left arm: 0=No drift, limb holds 90 (or 45) degrees for full 10 seconds   5b.  Motor right arm: 0=No drift, limb holds 90 (or 45) degrees for full 10 seconds   6a.  motor left le=No drift, limb holds 90 (or 45) degrees for full 10 seconds   6b  Motor right le=No drift, limb holds 90 (or 45) degrees for full 10 seconds   7. Limb Ataxia: 0=Absent   8.  Sensory: 1=Mild to moderate sensory loss; patient feels pinprick is less sharp or is dull on the affected side; there is a loss of superficial pain with pinprick but patient is aware He is being touched   9. Best Language:  0=No aphasia, normal   10. Dysarthria: 0=Normal   11. Extinction and Inattention: 0=No abnormality    Total:   1     WBC   Date Value Ref Range Status   2024 9.16 3.40 - 10.80 10*3/mm3 Final     RBC   Date Value Ref Range Status   2024 4.84 4.14 - 5.80 10*6/mm3 Final     Hemoglobin   Date Value Ref Range Status   2024 13.9 13.0 - 17.7 g/dL Final     Hematocrit   Date Value Ref Range Status   2024 42.4 37.5 - 51.0 % Final     MCV   Date Value Ref Range Status   2024 87.6 79.0 - 97.0 fL Final     MCH   Date Value Ref Range Status   2024 28.7 26.6 - 33.0 pg Final     MCHC   Date Value Ref Range Status   2024 32.8 31.5 - 35.7 g/dL Final     RDW   Date Value Ref Range Status   2024 13.5 12.3 - 15.4 % Final     RDW-SD   Date Value Ref Range Status   2024 43.3 37.0 - 54.0 fl Final     MPV   Date Value Ref Range Status   2024 10.6 6.0 - 12.0 fL Final     Platelets   Date Value Ref Range Status   2024 217 140 - 450 10*3/mm3 Final     Neutrophil %   Date Value Ref Range Status   2024 52.4 42.7 - 76.0 % Final     Lymphocyte %   Date Value Ref Range Status   2024 34.3 19.6 - 45.3 % Final     Monocyte %   Date Value Ref Range Status   2024 8.7 5.0 - 12.0 % Final     Eosinophil %   Date Value Ref Range Status   2024 3.5 0.3 - 6.2 % Final     Basophil %   Date Value Ref Range Status   2024 0.8 0.0 - 1.5 % Final     Immature Grans %   Date Value Ref Range Status   2024 0.3 0.0 - 0.5 % Final     Neutrophils, Absolute  "  Date Value Ref Range Status   08/19/2024 4.80 1.70 - 7.00 10*3/mm3 Final     Lymphocytes, Absolute   Date Value Ref Range Status   08/19/2024 3.14 (H) 0.70 - 3.10 10*3/mm3 Final     Monocytes, Absolute   Date Value Ref Range Status   08/19/2024 0.80 0.10 - 0.90 10*3/mm3 Final     Eosinophils, Absolute   Date Value Ref Range Status   08/19/2024 0.32 0.00 - 0.40 10*3/mm3 Final     Basophils, Absolute   Date Value Ref Range Status   08/19/2024 0.07 0.00 - 0.20 10*3/mm3 Final     Immature Grans, Absolute   Date Value Ref Range Status   08/19/2024 0.03 0.00 - 0.05 10*3/mm3 Final     nRBC   Date Value Ref Range Status   08/19/2024 0.0 0.0 - 0.2 /100 WBC Final      Basic Metabolic Panel    Sodium Sodium   Date Value Ref Range Status   08/19/2024 139 136 - 145 mmol/L Final      Potassium Potassium   Date Value Ref Range Status   08/19/2024 3.8 3.5 - 5.2 mmol/L Final     Comment:     Slight hemolysis detected by analyzer. Result may be falsely elevated.      Chloride Chloride   Date Value Ref Range Status   08/19/2024 104 98 - 107 mmol/L Final      Bicarbonate No results found for: \"PLASMABICARB\"   BUN BUN   Date Value Ref Range Status   08/19/2024 17 6 - 20 mg/dL Final      Creatinine Creatinine   Date Value Ref Range Status   08/19/2024 1.03 0.76 - 1.27 mg/dL Final      Calcium Calcium   Date Value Ref Range Status   08/19/2024 8.6 8.6 - 10.5 mg/dL Final      Glucose      No components found for: \"GLUCOSE.*\"      MRI Brain Without Contrast    Result Date: 6/23/2024  Impression: 1. No acute MRI findings. Specifically no findings to suggest a recent infarct. 2. Fairly extensive sinus disease without air-fluid level. Consider outpatient ENT follow-up. Electronically Signed: Ramiro Ruffin MD  6/23/2024 8:24 AM EDT  Workstation ID: [id number]    CT Angiogram Head w AI Analysis of LVO    Result Date: 6/22/2024  Impression: No evidence of hemodynamically significant stenosis, AVM or aneurysm within the head or neck. No large " vessel occlusion identified. No acute process. Electronically Signed: Kaye Mcfarland MD  6/22/2024 6:57 AM EDT  Workstation ID: [id number]    CT Angiogram Neck    Result Date: 6/22/2024  Impression: No evidence of hemodynamically significant stenosis, AVM or aneurysm within the head or neck. No large vessel occlusion identified. No acute process. Electronically Signed: Kaye Mcfarland MD  6/22/2024 6:57 AM EDT  Workstation ID: [id number]    CT CEREBRAL PERFUSION WITH & WITHOUT CONTRAST    Result Date: 6/22/2024  Impression: No evidence of core infarct or ischemic penumbra. Electronically Signed: Kaye Mcfarland MD  6/22/2024 6:56 AM EDT  Workstation ID: [id number]    CT Head Without Contrast Stroke Protocol    Result Date: 6/22/2024  Impression: No evidence of hemorrhage, mass effect or midline shift. No acute process identified. Electronically Signed: Kaye Mcfarland MD  6/22/2024 6:24 AM EDT  Workstation ID: [id number]      Results Reviewed:  I have personally reviewed current lab, radiology, and data and agree with results.    Results for orders placed during the hospital encounter of 06/22/24    Adult Transthoracic Echo Complete W/ Cont if Necessary Per Protocol (With Agitated Saline)    Interpretation Summary    Left ventricular systolic function is normal. Calculated left ventricular EF = 59.4% Left ventricular ejection fraction appears to be 56 - 60%.    Left ventricular diastolic function was normal.    Saline test results are negative.     Assessment/Plan:  54 y.o. male with PMH significant for hypertension and hyperlipidemia.  He presented to [facility] ED on ongoing intermittent left sided numbness. Left hand paresthesia persisting greater than 24 hours. NIH 1.  Prior imaging including CT head, CTA head and neck and CT perfusion were unremarkable.  Prior MRI was negative for stroke.      Antiplatelet PTA: Aspirin 81 mg  Anticoagulant PTA: None      # Left hand paresthesia  # Transient left scalp and left leg  paresthesia  -Differential includes AIS versus radiculopathy  -Prior extensive stroke workup unremarkable  -Stat MRI pending  -Load Plavix 300 mg now and continue Plavix 75 mg daily x 90 days  -ABCD2 score 5; moderate risk with a 90-day stroke risk 9.8%   -Outpatient heart valve clinic appointment added to ADT.  Recommend cardiac event monitor x 30 days  -Neurostroke will evaluate pending MRI with further recommendations  -If MRI negative, recommend follow-up in stroke clinic as scheduled    # Hypertension  -May normalize blood pressure goals at this time  -Avoid hypotension  -Follow-up with primary care provider for continued monitoring    # Hyperlipidemia  -Continue high-dose atorvastatin   -Most recent LDL 75, goal LDL less than 70    Plan of care discussed with patient and Dr. Whitaker.  Please call with any questions or concerns    Ayan Gonzalez PA-C  August 19, 2024  06:57 EDT

## 2024-08-19 NOTE — ED PROVIDER NOTES
Subjective   History of Present Illness  54-year-old male who presents for evaluation of tingling.  The patient describes tingling in his left index finger which radiate into the dorsal aspect of his left hand into the volar aspect of the left wrist.  This began yesterday and was present all day yesterday.  Today he began developing along the left lateral scalp and also some tingling along the left side of the tibia of the leg.  No thigh symptoms.  No weakness.  No change in cognition or speech.  No chest pain cough or shortness of breath.  No fever, bodies, chills.  No sick contacts.  No abdominal pain.  No change in bowel or urinary function.  He was evaluated roughly 2 months ago.  MRI CT and echo workup was negative.  The patient was initiated on dual platelet therapy which she is currently taking.  No other acute complaints.  No other medication changes.      Review of Systems   Constitutional:  Negative for chills, fatigue and fever.   HENT:  Negative for congestion, ear pain, postnasal drip, sinus pressure and sore throat.    Eyes:  Negative for pain, redness and visual disturbance.   Respiratory:  Negative for cough, chest tightness and shortness of breath.    Cardiovascular:  Negative for chest pain, palpitations and leg swelling.   Gastrointestinal:  Negative for abdominal pain, anal bleeding, blood in stool, diarrhea, nausea and vomiting.   Endocrine: Negative for polydipsia and polyuria.   Genitourinary:  Negative for difficulty urinating, dysuria, frequency and urgency.   Musculoskeletal:  Negative for arthralgias, back pain and neck pain.   Skin:  Negative for pallor and rash.   Allergic/Immunologic: Negative for environmental allergies and immunocompromised state.   Neurological:  Positive for numbness. Negative for dizziness, weakness and headaches.   Hematological:  Negative for adenopathy.   Psychiatric/Behavioral:  Negative for confusion, self-injury and suicidal ideas. The patient is not  nervous/anxious.    All other systems reviewed and are negative.      Past Medical History:   Diagnosis Date    Allergies        No Known Allergies    History reviewed. No pertinent surgical history.    History reviewed. No pertinent family history.    Social History     Socioeconomic History    Marital status:    Tobacco Use    Smoking status: Never     Passive exposure: Never    Smokeless tobacco: Never   Vaping Use    Vaping status: Never Used   Substance and Sexual Activity    Alcohol use: Never    Drug use: Never    Sexual activity: Defer           Objective   Physical Exam  Vitals and nursing note reviewed.   Constitutional:       General: He is not in acute distress.     Appearance: Normal appearance. He is well-developed. He is not toxic-appearing or diaphoretic.   HENT:      Head: Normocephalic and atraumatic.      Right Ear: External ear normal.      Left Ear: External ear normal.      Nose: Nose normal.   Eyes:      General: Lids are normal.      Pupils: Pupils are equal, round, and reactive to light.   Neck:      Trachea: No tracheal deviation.   Cardiovascular:      Rate and Rhythm: Normal rate and regular rhythm.      Pulses: No decreased pulses.      Heart sounds: Normal heart sounds. No murmur heard.     No friction rub. No gallop.   Pulmonary:      Effort: Pulmonary effort is normal. No respiratory distress.      Breath sounds: Normal breath sounds. No decreased breath sounds, wheezing, rhonchi or rales.   Abdominal:      General: Bowel sounds are normal.      Palpations: Abdomen is soft.      Tenderness: There is no abdominal tenderness. There is no guarding or rebound.   Musculoskeletal:         General: No deformity. Normal range of motion.      Cervical back: Normal range of motion and neck supple.   Lymphadenopathy:      Cervical: No cervical adenopathy.   Skin:     General: Skin is warm and dry.      Findings: No rash.   Neurological:      Mental Status: He is alert and oriented to  person, place, and time.      Cranial Nerves: No cranial nerve deficit.      Sensory: No sensory deficit.   Psychiatric:         Speech: Speech normal.         Behavior: Behavior normal.         Thought Content: Thought content normal.         Judgment: Judgment normal.         Procedures           ED Course  ED Course as of 08/19/24 1556   Mon Aug 19, 2024   0609 Stroke service consulted and will evaluate the patient.    The patient not tenecteplase candidate given symptoms that been going on for greater than 24 hours.  Previous workup from 2 months ago was reviewed and showed no acute abnormalities. [NS]      ED Course User Index  [NS] Osei Whitaker MD                                             Medical Decision Making  Differential diagnosis includes renal insufficiency, electrolyte abnormality, urinary tract infection, TIA, stroke, intracranial mass, other unspecified etiology.    Chest x-ray and lab workup is nonrevealing with normal urinalysis, normal kidney function electrolytes.    I discussed the patient with the stroke service after review of the previous workup.  They have suggested a MRI.    MRI of the brain was performed and showed no acute abnormalities.    The patient has remained stable here in the ER.    Stroke service has recommended outpatient follow-up and the patient is agreeable with this course.    Discharged home appearing well.    Problems Addressed:  Numbness and tingling in left arm: complicated acute illness or injury with systemic symptoms  Numbness and tingling of left leg: complicated acute illness or injury with systemic symptoms    Amount and/or Complexity of Data Reviewed  External Data Reviewed: labs and radiology.  Labs: ordered. Decision-making details documented in ED Course.  Radiology: ordered and independent interpretation performed. Decision-making details documented in ED Course.  ECG/medicine tests: ordered and independent interpretation performed. Decision-making  details documented in ED Course.     Details:  EKG independently interpreted by myself shows sinus rhythm without acute ischemic changes.    Risk  Prescription drug management.        Final diagnoses:   Numbness and tingling in left arm   Numbness and tingling of left leg       ED Disposition  ED Disposition       ED Disposition   Discharge    Condition   Stable    Comment   --               [facility]  [address]  [facility] [id number] [phone]  Schedule an appointment as soon as possible for a visit in 1 day(s)  Would like a Holter monitor 30 days for AFIB evaluation related to left sided numbness.    Samantha Saravia, APRN  [address]   [address]  [place] [address]  [phone]    In 1 week           Medication List      No changes were made to your prescriptions during this visit.            Osei Whitaker MD  08/19/24 3661

## 2024-08-29 LAB
QT INTERVAL: 358 MS
QTC INTERVAL: 423 MS

## 2024-10-02 ENCOUNTER — OFFICE VISIT (OUTPATIENT)
Dept: INTERNAL MEDICINE | Facility: CLINIC | Age: 54
End: 2024-10-02
Payer: COMMERCIAL

## 2024-10-02 VITALS
HEART RATE: 92 BPM | OXYGEN SATURATION: 97 % | TEMPERATURE: 98 F | RESPIRATION RATE: 18 BRPM | DIASTOLIC BLOOD PRESSURE: 74 MMHG | BODY MASS INDEX: 37.36 KG/M2 | WEIGHT: 203 LBS | SYSTOLIC BLOOD PRESSURE: 130 MMHG | HEIGHT: 62 IN

## 2024-10-02 DIAGNOSIS — R73.03 PREDIABETES: Primary | ICD-10-CM

## 2024-10-02 DIAGNOSIS — I10 ESSENTIAL HYPERTENSION: ICD-10-CM

## 2024-10-02 DIAGNOSIS — G45.9 TIA (TRANSIENT ISCHEMIC ATTACK): ICD-10-CM

## 2024-10-02 DIAGNOSIS — E78.2 MIXED HYPERLIPIDEMIA: ICD-10-CM

## 2024-10-02 DIAGNOSIS — G47.30 SLEEP APNEA, UNSPECIFIED TYPE: ICD-10-CM

## 2024-10-02 LAB
EXPIRATION DATE: ABNORMAL
HBA1C MFR BLD: 5.9 % (ref 4.5–5.7)
Lab: ABNORMAL

## 2024-10-02 PROCEDURE — 3044F HG A1C LEVEL LT 7.0%: CPT | Performed by: NURSE PRACTITIONER

## 2024-10-02 PROCEDURE — 3075F SYST BP GE 130 - 139MM HG: CPT | Performed by: NURSE PRACTITIONER

## 2024-10-02 PROCEDURE — 1126F AMNT PAIN NOTED NONE PRSNT: CPT | Performed by: NURSE PRACTITIONER

## 2024-10-02 PROCEDURE — 99214 OFFICE O/P EST MOD 30 MIN: CPT | Performed by: NURSE PRACTITIONER

## 2024-10-02 PROCEDURE — 83036 HEMOGLOBIN GLYCOSYLATED A1C: CPT | Performed by: NURSE PRACTITIONER

## 2024-10-02 PROCEDURE — 3078F DIAST BP <80 MM HG: CPT | Performed by: NURSE PRACTITIONER

## 2024-10-02 PROCEDURE — 1159F MED LIST DOCD IN RCRD: CPT | Performed by: NURSE PRACTITIONER

## 2024-10-02 PROCEDURE — 1160F RVW MEDS BY RX/DR IN RCRD: CPT | Performed by: NURSE PRACTITIONER

## 2024-10-02 RX ORDER — LISINOPRIL 10 MG/1
10 TABLET ORAL DAILY
Qty: 90 TABLET | Refills: 1 | Status: SHIPPED | OUTPATIENT
Start: 2024-10-02

## 2024-10-02 RX ORDER — FENOFIBRATE 145 MG/1
145 TABLET, COATED ORAL DAILY
Qty: 90 TABLET | Refills: 1 | Status: SHIPPED | OUTPATIENT
Start: 2024-10-02

## 2024-10-02 RX ORDER — AMLODIPINE BESYLATE 10 MG/1
10 TABLET ORAL DAILY
Qty: 90 TABLET | Refills: 1 | Status: SHIPPED | OUTPATIENT
Start: 2024-10-02

## 2024-10-02 RX ORDER — ATORVASTATIN CALCIUM 80 MG/1
80 TABLET, FILM COATED ORAL NIGHTLY
Qty: 90 TABLET | Refills: 1 | Status: SHIPPED | OUTPATIENT
Start: 2024-10-02

## 2024-10-02 NOTE — PROGRESS NOTES
Subjective   Demetria Johnson is a 54 y.o. male.     Chief Complaint   Patient presents with    Transient Ischemic Attack    Hyperlipidemia       PCP: Samantha Saravia APRN    History of Present Illness  The patient is a 54-year-old male here for follow-up on hyperlipidemia, hypertension, and prediabetes.    He was seen in the emergency department on 08/19/2024 with numbness and tingling in the left arm and leg. At that time, he was advised to follow up with Cardiology and Neurology for a suspected transient ischemic attack (TIA). He had a previous TIA in 06/2024, which presented with left-sided weakness that resolved. He was treated with dual antiplatelet therapy (DAPT) for 21 days, followed by aspirin monotherapy, and was continued on a high-dose statin at 80 mg.    He reports feeling well currently and continues to take baby aspirin daily. He does not have a cardiologist. He denies any respiratory issues, chest pain, or heart palpitations.      He has a history of working in construction and has experienced carpal tunnel syndrome in both hands but declined surgery. He occasionally experiences numbness. He admits to consuming soda     He uses a CPAP machine for sleep apnea and is under the care of a specialist for this condition.     His initial MRI of the brain 6/22/2024 demonstrating no acute findings there were concerns at that time a possible TIA versus small infarct that had not shown up when he went to the emergency department again on 8/19/2024 that MRI was also without any acute ischemic events  Did show potential pansinusitis but he does not report any sinus symptoms    He does follow with the stroke team and saw KIMBERLEE Asif on 8/1/2024 and has a plan to follow-up with her on 2/3/2025    Results  Laboratory Studies  Hemoglobin A1c is down to 5.9.    Lab Results   Component Value Date    WBC 9.16 08/19/2024    HGB 13.9 08/19/2024    HCT 42.4 08/19/2024    MCV 87.6 08/19/2024      08/19/2024     Lab Results   Component Value Date    GLUCOSE 124 (H) 08/19/2024    BUN 17 08/19/2024    CREATININE 1.03 08/19/2024    EGFR 86.9 08/19/2024    BCR 16.5 08/19/2024    K 3.8 08/19/2024    CO2 25.0 08/19/2024    CALCIUM 8.6 08/19/2024    ALBUMIN 4.1 08/19/2024    BILITOT 0.3 08/19/2024    AST 18 08/19/2024    ALT 24 08/19/2024     Lab Results   Component Value Date    CHOL 133 06/23/2024    TRIG 115 06/23/2024    HDL 37 (L) 06/23/2024    LDL 75 06/23/2024     Lab Results   Component Value Date    TSH 1.770 08/19/2024     A1C Last 3 Results          3/25/2024    08:12 6/23/2024    04:34 10/2/2024    10:55   HGBA1C Last 3 Results   Hemoglobin A1C 6.2  6.00  5.9      MRI Brain With & Without Contrast    Result Date: 8/19/2024  Impression: 1. Stable examination without acute intracranial process or acute ischemic event noted. 2. Pansinusitis. Electronically Signed: Zabrina Courtney MD  8/19/2024 8:12 AM EDT  Workstation ID: [id number]    XR Chest 1 View    Result Date: 8/19/2024  Impression: No acute cardiopulmonary abnormality. Electronically Signed: Ld Velez MD  8/19/2024 6:49 AM EDT  Workstation ID: [id number]     The following portions of the patient's history were reviewed and updated as appropriate: allergies, current medications, past family history, past medical history, past social history, past surgical history and problem list.              Outpatient Medications Marked as Taking for the 10/2/24 encounter (Office Visit) with Samantha Saravia APRN   Medication Sig Dispense Refill    amLODIPine (NORVASC) 10 MG tablet Take 1 tablet by mouth Daily. 90 tablet 1    aspirin (aspirin) 81 MG EC tablet Take 1 tablet by mouth Daily. 90 tablet 1    atorvastatin (LIPITOR) 80 MG tablet Take 1 tablet by mouth Every Night. 90 tablet 1    Dupixent 300 MG/2ML solution prefilled syringe       Dymista 137-50 MCG/ACT suspension       fenofibrate (TRICOR) 145 MG tablet Take 1 tablet by mouth Daily. 90 tablet 1     "fluorometholone (FML) 0.1 % ophthalmic suspension       Lifitegrast (XIIDRA) 5 % ophthalmic solution Apply 1 drop to eye(s) as directed by provider.      lisinopril (PRINIVIL,ZESTRIL) 10 MG tablet Take 1 tablet by mouth Daily. 90 tablet 1    loratadine (CLARITIN) 10 MG tablet Take 1 tablet by mouth Daily.      nystatin-triamcinolone (MYCOLOG) 640972-7.1 UNIT/GM-% ointment Apply  topically to the appropriate area as directed 2 (Two) Times a Day. 60 g 1    pantoprazole (PROTONIX) 40 MG EC tablet Take 1 tablet by mouth Every Morning Before Breakfast. 90 tablet 1    Restasis 0.05 % ophthalmic emulsion       Symbicort 160-4.5 MCG/ACT inhaler       Ventolin  (90 Base) MCG/ACT inhaler       [DISCONTINUED] amLODIPine (NORVASC) 10 MG tablet Take 1 tablet by mouth Daily. 90 tablet 1    [DISCONTINUED] atorvastatin (LIPITOR) 80 MG tablet Take 1 tablet by mouth Every Night. 90 tablet 0    [DISCONTINUED] fenofibrate (TRICOR) 145 MG tablet       [DISCONTINUED] lisinopril (PRINIVIL,ZESTRIL) 10 MG tablet Take 1 tablet by mouth Daily. 90 tablet 1     No Known Allergies        Objective     Vitals:    10/02/24 1046   BP: 130/74   BP Location: Left arm   Patient Position: Sitting   Cuff Size: Adult   Pulse: 92   Resp: 18   Temp: 98 °F (36.7 °C)   TempSrc: Infrared   SpO2: 97%   Weight: 92.1 kg (203 lb)   Height: 157.5 cm (62\")   PainSc: 0-No pain     Body mass index is 37.13 kg/m².  Wt Readings from Last 3 Encounters:   10/02/24 92.1 kg (203 lb)   08/19/24 90.7 kg (200 lb)   08/01/24 96.2 kg (212 lb)     Physical Exam  Vitals and nursing note reviewed.   Constitutional:       General: He is not in acute distress.     Appearance: Normal appearance. He is well-developed. He is not ill-appearing or diaphoretic.   HENT:      Head: Normocephalic and atraumatic.   Eyes:      General: No scleral icterus.     Conjunctiva/sclera: Conjunctivae normal.   Neck:      Vascular: No carotid bruit or JVD.   Cardiovascular:      Rate and Rhythm: " Normal rate and regular rhythm.      Chest Wall: PMI is not displaced. No thrill.      Heart sounds: Normal heart sounds. No murmur heard.  Pulmonary:      Effort: Pulmonary effort is normal. No accessory muscle usage or respiratory distress.      Breath sounds: Normal breath sounds.   Chest:      Chest wall: No tenderness.   Abdominal:      General: Bowel sounds are normal. There is no distension.      Palpations: Abdomen is soft.      Tenderness: There is no abdominal tenderness.   Musculoskeletal:      Cervical back: Normal range of motion.      Right lower leg: No edema.      Left lower leg: No edema.   Skin:     General: Skin is warm and dry.      Coloration: Skin is not ashen, jaundiced, mottled or pale.      Findings: No erythema.   Neurological:      Mental Status: He is alert and oriented to person, place, and time.   Psychiatric:         Attention and Perception: Attention normal.         Mood and Affect: Mood normal.         Speech: Speech normal.         Behavior: Behavior normal. Behavior is cooperative.         Thought Content: Thought content normal.         Judgment: Judgment normal.           Assessment & Plan   Diagnoses and all orders for this visit:    1. Prediabetes (Primary)  -     POC Glycosylated Hemoglobin (Hb A1C)    2. Mixed hyperlipidemia  -     atorvastatin (LIPITOR) 80 MG tablet; Take 1 tablet by mouth Every Night.  Dispense: 90 tablet; Refill: 1  -     fenofibrate (TRICOR) 145 MG tablet; Take 1 tablet by mouth Daily.  Dispense: 90 tablet; Refill: 1    3. Essential hypertension  -     lisinopril (PRINIVIL,ZESTRIL) 10 MG tablet; Take 1 tablet by mouth Daily.  Dispense: 90 tablet; Refill: 1  -     amLODIPine (NORVASC) 10 MG tablet; Take 1 tablet by mouth Daily.  Dispense: 90 tablet; Refill: 1    4. TIA (transient ischemic attack)  -     atorvastatin (LIPITOR) 80 MG tablet; Take 1 tablet by mouth Every Night.  Dispense: 90 tablet; Refill: 1    5. Sleep apnea, unspecified  type        Assessment & Plan  1. Hyperlipidemia.  He will continue on the high-dose statin at 80 mg as previously prescribed. The prescription will be sent to his pharmacy.    2. Hypertension.  His blood pressure is well-controlled on the current regimen. He will continue his blood pressure medication.    3. Prediabetes.  His hemoglobin A1c has improved to 5.9 but remains in the prediabetic range. Hemoglobin A1c levels will be monitored every three months. He is advised to continue a healthy diet and regular physical activity.    4. Transient Ischemic Attack (TIA).  He experienced numbness and tingling in the left arm and leg, with a previous episode in June 2024. He will continue aspirin therapy.     5.. Sleep Apnea.  He will continue using his CPAP machine and follow up with  for his sleep apnea management. Referral to [facility] is offered if needed.  He declined                Return in about 3 months (around 1/2/2025) for chronic condition follow up.    I discussed my findings,recommendations, and plan of care was with the patient. They verbalized understanding and agreement.  Patient was encouraged to keep me informed of any acute changes, lack of improvement, or any new concerning symptoms.     Patient or patient representative verbalized consent for the use of Ambient Listening during the visit with  KIMBERLEE Sutherland for chart documentation. 10/2/2024  12:04 EDT

## 2025-01-08 ENCOUNTER — OFFICE VISIT (OUTPATIENT)
Dept: INTERNAL MEDICINE | Facility: CLINIC | Age: 55
End: 2025-01-08
Payer: COMMERCIAL

## 2025-01-08 VITALS
OXYGEN SATURATION: 96 % | WEIGHT: 222 LBS | HEART RATE: 97 BPM | SYSTOLIC BLOOD PRESSURE: 134 MMHG | TEMPERATURE: 97.7 F | HEIGHT: 62 IN | RESPIRATION RATE: 14 BRPM | DIASTOLIC BLOOD PRESSURE: 86 MMHG | BODY MASS INDEX: 40.85 KG/M2

## 2025-01-08 DIAGNOSIS — I10 ESSENTIAL HYPERTENSION: ICD-10-CM

## 2025-01-08 DIAGNOSIS — E78.2 MIXED HYPERLIPIDEMIA: ICD-10-CM

## 2025-01-08 DIAGNOSIS — G45.9 TIA (TRANSIENT ISCHEMIC ATTACK): ICD-10-CM

## 2025-01-08 DIAGNOSIS — R73.03 PREDIABETES: Primary | ICD-10-CM

## 2025-01-08 DIAGNOSIS — K21.9 CHRONIC GERD: ICD-10-CM

## 2025-01-08 PROCEDURE — 3079F DIAST BP 80-89 MM HG: CPT | Performed by: NURSE PRACTITIONER

## 2025-01-08 PROCEDURE — 1159F MED LIST DOCD IN RCRD: CPT | Performed by: NURSE PRACTITIONER

## 2025-01-08 PROCEDURE — 3075F SYST BP GE 130 - 139MM HG: CPT | Performed by: NURSE PRACTITIONER

## 2025-01-08 PROCEDURE — 99214 OFFICE O/P EST MOD 30 MIN: CPT | Performed by: NURSE PRACTITIONER

## 2025-01-08 PROCEDURE — 1160F RVW MEDS BY RX/DR IN RCRD: CPT | Performed by: NURSE PRACTITIONER

## 2025-01-08 PROCEDURE — 1126F AMNT PAIN NOTED NONE PRSNT: CPT | Performed by: NURSE PRACTITIONER

## 2025-01-08 RX ORDER — ATORVASTATIN CALCIUM 80 MG/1
80 TABLET, FILM COATED ORAL NIGHTLY
Qty: 90 TABLET | Refills: 1 | Status: SHIPPED | OUTPATIENT
Start: 2025-01-08

## 2025-01-08 RX ORDER — LISINOPRIL 10 MG/1
10 TABLET ORAL DAILY
Qty: 90 TABLET | Refills: 1 | Status: SHIPPED | OUTPATIENT
Start: 2025-01-08

## 2025-01-08 RX ORDER — PANTOPRAZOLE SODIUM 40 MG/1
40 TABLET, DELAYED RELEASE ORAL
Qty: 90 TABLET | Refills: 1 | Status: SHIPPED | OUTPATIENT
Start: 2025-01-08

## 2025-01-08 RX ORDER — AMLODIPINE BESYLATE 10 MG/1
10 TABLET ORAL DAILY
Qty: 90 TABLET | Refills: 1 | Status: SHIPPED | OUTPATIENT
Start: 2025-01-08

## 2025-01-08 NOTE — PROGRESS NOTES
Subjective   Demetria Johnson is a 54 y.o. male.     Chief Complaint   Patient presents with    Prediabetes     3 month recheck        PCP: Samantha Saravia APRN    History of Present Illness     History of Present Illness  The patient is a 54-year-old male here for a follow-up on prediabetes, hyperlipidemia, hypertension, and history of TIA. He declined COVID-19 and influenza vaccines.    He reports no significant changes in his health status since the last visit. He experiences occasional nervousness and has been advised to seek medical attention if his symptoms worsen. He does not monitor his blood glucose levels at home but maintains a diet low in sugar. He does not engage in regular physical exercise. His occupation involves construction work, which provides some physical activity. He is not experiencing any chest pain, shortness of breath, heart palpitations, headaches, dizziness, or swelling in his feet and ankles.    He is currently on amlodipine and lisinopril for hypertension management.    He is also taking atorvastatin for hyperlipidemia. He was previously on fenofibrate, but this medication was discontinued during his hospital stay.    He is on a regimen of baby aspirin and clopidogrel, the latter of which was prescribed for a duration of 21 days following a transient ischemic attack (TIA).    SOCIAL HISTORY  He works in construction.    MEDICATIONS  Current: Amlodipine, lisinopril, atorvastatin, baby aspirin.  Discontinued: Fenofibrate, clopidogrel. (DAPT x21 days only at discharge)    IMMUNIZATIONS  He declined COVID-19 and influenza vaccines.    Results      Lab Results   Component Value Date    WBC 9.16 08/19/2024    HGB 13.9 08/19/2024    HCT 42.4 08/19/2024    MCV 87.6 08/19/2024     08/19/2024     Lab Results   Component Value Date    GLUCOSE 124 (H) 08/19/2024    BUN 17 08/19/2024    CREATININE 1.03 08/19/2024    EGFR 86.9 08/19/2024    BCR 16.5 08/19/2024    K 3.8 08/19/2024     CO2 25.0 08/19/2024    CALCIUM 8.6 08/19/2024    ALBUMIN 4.1 08/19/2024    BILITOT 0.3 08/19/2024    AST 18 08/19/2024    ALT 24 08/19/2024     Lab Results   Component Value Date    CHOL 133 06/23/2024    TRIG 115 06/23/2024    HDL 37 (L) 06/23/2024    LDL 75 06/23/2024     Lab Results   Component Value Date    TSH 1.770 08/19/2024     A1C Last 3 Results          3/25/2024    08:12 6/23/2024    04:34 10/2/2024    10:55   HGBA1C Last 3 Results   Hemoglobin A1C 6.2  6.00  5.9        The following portions of the patient's history were reviewed and updated as appropriate: allergies, current medications, past family history, past medical history, past social history, past surgical history and problem list.              Outpatient Medications Marked as Taking for the 1/8/25 encounter (Office Visit) with Samantha Saravia APRN   Medication Sig Dispense Refill    amLODIPine (NORVASC) 10 MG tablet Take 1 tablet by mouth Daily. 90 tablet 1    aspirin (aspirin) 81 MG EC tablet Take 1 tablet by mouth Daily. 90 tablet 1    atorvastatin (LIPITOR) 80 MG tablet Take 1 tablet by mouth Every Night. 90 tablet 1    Azelastine HCl 137 MCG/SPRAY solution       Dupixent 300 MG/2ML solution prefilled syringe       Dymista 137-50 MCG/ACT suspension       lisinopril (PRINIVIL,ZESTRIL) 10 MG tablet Take 1 tablet by mouth Daily. 90 tablet 1    pantoprazole (PROTONIX) 40 MG EC tablet Take 1 tablet by mouth Every Morning Before Breakfast. 90 tablet 1    [DISCONTINUED] amLODIPine (NORVASC) 10 MG tablet Take 1 tablet by mouth Daily. 90 tablet 1    [DISCONTINUED] atorvastatin (LIPITOR) 80 MG tablet Take 1 tablet by mouth Every Night. 90 tablet 1    [DISCONTINUED] lisinopril (PRINIVIL,ZESTRIL) 10 MG tablet Take 1 tablet by mouth Daily. 90 tablet 1    [DISCONTINUED] pantoprazole (PROTONIX) 40 MG EC tablet Take 1 tablet by mouth Every Morning Before Breakfast. 90 tablet 1     No Known Allergies        Objective     Vitals:    01/08/25 1010   BP:  "134/86   Pulse: 97   Resp: 14   Temp: 97.7 °F (36.5 °C)   TempSrc: Infrared   SpO2: 96%   Weight: 101 kg (222 lb)   Height: 157.5 cm (62.01\")     Body mass index is 40.59 kg/m².  Wt Readings from Last 3 Encounters:   01/08/25 101 kg (222 lb)   10/02/24 92.1 kg (203 lb)   08/19/24 90.7 kg (200 lb)       Physical Exam  Vitals and nursing note reviewed.   Constitutional:       Appearance: Normal appearance. He is well-developed.   HENT:      Head: Normocephalic and atraumatic.   Eyes:      Conjunctiva/sclera: Conjunctivae normal.   Cardiovascular:      Rate and Rhythm: Normal rate and regular rhythm.      Heart sounds: Normal heart sounds.   Pulmonary:      Effort: Pulmonary effort is normal. No respiratory distress.      Breath sounds: Normal breath sounds.   Abdominal:      General: Bowel sounds are normal. There is no distension.      Palpations: Abdomen is soft.      Tenderness: There is no abdominal tenderness.   Musculoskeletal:      Cervical back: Normal range of motion.   Skin:     General: Skin is warm and dry.   Neurological:      Mental Status: He is alert and oriented to person, place, and time.   Psychiatric:         Speech: Speech normal.         Behavior: Behavior normal.         Thought Content: Thought content normal.         Judgment: Judgment normal.               Assessment & Plan   Diagnoses and all orders for this visit:    1. Prediabetes (Primary)  -     CBC (No Diff); Future  -     Comprehensive Metabolic Panel; Future  -     Lipid Panel; Future  -     Hemoglobin A1c; Future    2. Mixed hyperlipidemia  -     CBC (No Diff); Future  -     Comprehensive Metabolic Panel; Future  -     Lipid Panel; Future  -     Hemoglobin A1c; Future  -     atorvastatin (LIPITOR) 80 MG tablet; Take 1 tablet by mouth Every Night.  Dispense: 90 tablet; Refill: 1    3. Essential hypertension  -     CBC (No Diff); Future  -     Comprehensive Metabolic Panel; Future  -     Lipid Panel; Future  -     Hemoglobin A1c; " Future  -     amLODIPine (NORVASC) 10 MG tablet; Take 1 tablet by mouth Daily.  Dispense: 90 tablet; Refill: 1  -     lisinopril (PRINIVIL,ZESTRIL) 10 MG tablet; Take 1 tablet by mouth Daily.  Dispense: 90 tablet; Refill: 1    4. Chronic GERD  -     pantoprazole (PROTONIX) 40 MG EC tablet; Take 1 tablet by mouth Every Morning Before Breakfast.  Dispense: 90 tablet; Refill: 1    5. TIA (transient ischemic attack)  -     atorvastatin (LIPITOR) 80 MG tablet; Take 1 tablet by mouth Every Night.  Dispense: 90 tablet; Refill: 1        Assessment & Plan  1. Prediabetes.  He reports no significant changes since the last visit and continues to avoid sweets. 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 He will continue monitoring his blood sugar levels at the clinic.    2. Hyperlipidemia.  He will continue taking atorvastatin. Fenofibrate has been discontinued by pt for now. Fasting blood work has been ordered to assess cholesterol levels. If triglycerides remain high, fenofibrate will be reintroduced.    3. Hypertension.  His blood pressure is well-controlled. He will continue his current antihypertensive medications, amlodipine and lisinopril.    4. History of Transient Ischemic Attack (TIA).  He will continue taking baby aspirin. Clopidogrel was a temporary medication and has been discontinued as planned.    Follow-up  The patient will follow up in 3 months.            Return in about 3 months (around 4/8/2025) for chronic condition follow up, and needs to return for labs within 1-2 weeks.    I discussed my findings,recommendations, and plan of care was with the patient. They verbalized understanding and agreement.  Patient was encouraged to keep me informed of any acute changes, lack of improvement, or any new concerning symptoms.     Patient or patient representative verbalized consent for the use of Ambient Listening during the visit with  KIMBERLEE Sutherland for  chart documentation. 1/8/2025  12:45 EST

## 2025-01-09 ENCOUNTER — LAB (OUTPATIENT)
Dept: INTERNAL MEDICINE | Facility: CLINIC | Age: 55
End: 2025-01-09
Payer: COMMERCIAL

## 2025-01-09 DIAGNOSIS — E78.2 MIXED HYPERLIPIDEMIA: ICD-10-CM

## 2025-01-09 DIAGNOSIS — R73.03 PREDIABETES: ICD-10-CM

## 2025-01-09 DIAGNOSIS — I10 ESSENTIAL HYPERTENSION: ICD-10-CM

## 2025-01-09 LAB
ALBUMIN SERPL-MCNC: 4.2 G/DL (ref 3.5–5.2)
ALBUMIN/GLOB SERPL: 1.3 G/DL
ALP SERPL-CCNC: 126 U/L (ref 39–117)
ALT SERPL W P-5'-P-CCNC: 30 U/L (ref 1–41)
ANION GAP SERPL CALCULATED.3IONS-SCNC: 11.2 MMOL/L (ref 5–15)
AST SERPL-CCNC: 22 U/L (ref 1–40)
BILIRUB SERPL-MCNC: 0.5 MG/DL (ref 0–1.2)
BUN SERPL-MCNC: 18 MG/DL (ref 6–20)
BUN/CREAT SERPL: 18 (ref 7–25)
CALCIUM SPEC-SCNC: 9.5 MG/DL (ref 8.6–10.5)
CHLORIDE SERPL-SCNC: 112 MMOL/L (ref 98–107)
CHOLEST SERPL-MCNC: 110 MG/DL (ref 0–200)
CO2 SERPL-SCNC: 26.8 MMOL/L (ref 22–29)
CREAT SERPL-MCNC: 1 MG/DL (ref 0.76–1.27)
DEPRECATED RDW RBC AUTO: 40.5 FL (ref 37–54)
EGFRCR SERPLBLD CKD-EPI 2021: 90 ML/MIN/1.73
ERYTHROCYTE [DISTWIDTH] IN BLOOD BY AUTOMATED COUNT: 13 % (ref 12.3–15.4)
GLOBULIN UR ELPH-MCNC: 3.2 GM/DL
GLUCOSE SERPL-MCNC: 115 MG/DL (ref 65–99)
HBA1C MFR BLD: 6.3 % (ref 4.8–5.6)
HCT VFR BLD AUTO: 43.1 % (ref 37.5–51)
HDLC SERPL-MCNC: 42 MG/DL (ref 40–60)
HGB BLD-MCNC: 14.7 G/DL (ref 13–17.7)
LDLC SERPL CALC-MCNC: 50 MG/DL (ref 0–100)
LDLC/HDLC SERPL: 1.17 {RATIO}
MCH RBC QN AUTO: 29.6 PG (ref 26.6–33)
MCHC RBC AUTO-ENTMCNC: 34.1 G/DL (ref 31.5–35.7)
MCV RBC AUTO: 86.9 FL (ref 79–97)
PLATELET # BLD AUTO: 237 10*3/MM3 (ref 140–450)
PMV BLD AUTO: 10.4 FL (ref 6–12)
POTASSIUM SERPL-SCNC: 5.1 MMOL/L (ref 3.5–5.2)
PROT SERPL-MCNC: 7.4 G/DL (ref 6–8.5)
RBC # BLD AUTO: 4.96 10*6/MM3 (ref 4.14–5.8)
SODIUM SERPL-SCNC: 150 MMOL/L (ref 136–145)
TRIGL SERPL-MCNC: 95 MG/DL (ref 0–150)
VLDLC SERPL-MCNC: 18 MG/DL (ref 5–40)
WBC NRBC COR # BLD AUTO: 8.18 10*3/MM3 (ref 3.4–10.8)

## 2025-01-09 PROCEDURE — 80061 LIPID PANEL: CPT | Performed by: NURSE PRACTITIONER

## 2025-01-09 PROCEDURE — 36415 COLL VENOUS BLD VENIPUNCTURE: CPT | Performed by: NURSE PRACTITIONER

## 2025-01-09 PROCEDURE — 80053 COMPREHEN METABOLIC PANEL: CPT | Performed by: NURSE PRACTITIONER

## 2025-01-09 PROCEDURE — 85027 COMPLETE CBC AUTOMATED: CPT | Performed by: NURSE PRACTITIONER

## 2025-01-09 PROCEDURE — 83036 HEMOGLOBIN GLYCOSYLATED A1C: CPT | Performed by: NURSE PRACTITIONER

## 2025-01-14 DIAGNOSIS — E87.0 SERUM SODIUM ELEVATED: Primary | ICD-10-CM

## 2025-01-15 ENCOUNTER — LAB (OUTPATIENT)
Dept: INTERNAL MEDICINE | Facility: CLINIC | Age: 55
End: 2025-01-15
Payer: COMMERCIAL

## 2025-01-15 DIAGNOSIS — E87.0 SERUM SODIUM ELEVATED: ICD-10-CM

## 2025-01-15 LAB
ANION GAP SERPL CALCULATED.3IONS-SCNC: 10 MMOL/L (ref 5–15)
BUN SERPL-MCNC: 14 MG/DL (ref 6–20)
BUN/CREAT SERPL: 12.2 (ref 7–25)
CALCIUM SPEC-SCNC: 9.5 MG/DL (ref 8.6–10.5)
CHLORIDE SERPL-SCNC: 105 MMOL/L (ref 98–107)
CO2 SERPL-SCNC: 25 MMOL/L (ref 22–29)
CREAT SERPL-MCNC: 1.15 MG/DL (ref 0.76–1.27)
EGFRCR SERPLBLD CKD-EPI 2021: 76.1 ML/MIN/1.73
GLUCOSE SERPL-MCNC: 130 MG/DL (ref 65–99)
POTASSIUM SERPL-SCNC: 4.9 MMOL/L (ref 3.5–5.2)
SODIUM SERPL-SCNC: 140 MMOL/L (ref 136–145)

## 2025-01-15 PROCEDURE — 36415 COLL VENOUS BLD VENIPUNCTURE: CPT | Performed by: NURSE PRACTITIONER

## 2025-01-15 PROCEDURE — 80048 BASIC METABOLIC PNL TOTAL CA: CPT | Performed by: NURSE PRACTITIONER

## 2025-02-03 ENCOUNTER — OFFICE VISIT (OUTPATIENT)
Dept: NEUROLOGY | Facility: CLINIC | Age: 55
End: 2025-02-03
Payer: COMMERCIAL

## 2025-02-03 VITALS
SYSTOLIC BLOOD PRESSURE: 142 MMHG | DIASTOLIC BLOOD PRESSURE: 86 MMHG | HEIGHT: 62 IN | HEART RATE: 109 BPM | OXYGEN SATURATION: 97 % | WEIGHT: 222 LBS | TEMPERATURE: 97.9 F | BODY MASS INDEX: 40.85 KG/M2

## 2025-02-03 DIAGNOSIS — Z86.73 HISTORY OF TIA (TRANSIENT ISCHEMIC ATTACK): Primary | ICD-10-CM

## 2025-02-03 NOTE — PROGRESS NOTES
Follow Up Office Visit      Encounter Date: 2025   Patient Name: Demetria Johnson  : 1970   MRN: [id number]   PCP: Samantha Saravia APRN    Chief Complaint:    Chief Complaint   Patient presents with    Follow-up       History of Present Illness: Demetria Johnson is a 54 y.o. male who is here today to establish care.  Patient has known medical history significant for hypertension and hyperlipidemia.  He presented to [facility] ED on 2024 with left sided numbness and weakness.  Initial NIH was a 1.  Acute stroke imaging including CT head, CTA head and neck and CT perfusion were unremarkable.  Ultimately MRI was negative for stroke.  Echocardiogram was performed and negative for cardiac source.  Ultimately patient was diagnosed with a TIA likely due to small vessel disease.  Discharged on DAPT x 21 days followed by aspirin monotherapy.     Clinic visit 2024: Patient presents to clinic for routine follow-up.  He reports that he has had ongoing intermittent paresthesias of the left arm and face/scalp.  No new or worsening strokelike symptoms.  Discussed with patient that due to his ongoing symptoms it is likely that rather than a TIA he is experienced a very small ischemic stroke in the right hemisphere that may not have been visualized on MRI.  Either way the treatment plan will not change.  We need to keep him on an aspirin indefinitely as well as statin as a statin.  Discussed importance of lifestyle modification and addressing all vascular risk factors.  We did discuss completing a Holter monitor to evaluate for any chance of PAF which is less likely.  Patient would like to defer at this time.  Encourage patient to be compliant with his CPAP.  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     Clinic visit : Patient reports he has been doing very well.  He feels he is completely back to his baseline.  Since our last visit he did go to the hospital for  "transient left hand tingling.  He now believes the symptoms may be attributed to carpal tunnel.  He is not interested in having carpal tunnel release surgery at this time.  He has been taking his aspirin and Lipitor without issues or side effects.    Subjective        I have reviewed and the following portions of the patient's history were updated as appropriate: past family history, past medical history, past social history, past surgical history and problem list.    Medications:     Current Outpatient Medications:     amLODIPine (NORVASC) 10 MG tablet, Take 1 tablet by mouth Daily., Disp: 90 tablet, Rfl: 1    aspirin (aspirin) 81 MG EC tablet, Take 1 tablet by mouth Daily., Disp: 90 tablet, Rfl: 1    atorvastatin (LIPITOR) 80 MG tablet, Take 1 tablet by mouth Every Night., Disp: 90 tablet, Rfl: 1    Azelastine HCl 137 MCG/SPRAY solution, , Disp: , Rfl:     Dupixent 300 MG/2ML solution prefilled syringe, , Disp: , Rfl:     Dymista 137-50 MCG/ACT suspension, , Disp: , Rfl:     Lifitegrast (XIIDRA) 5 % ophthalmic solution, Apply 1 drop to eye(s) as directed by provider., Disp: , Rfl:     lisinopril (PRINIVIL,ZESTRIL) 10 MG tablet, Take 1 tablet by mouth Daily., Disp: 90 tablet, Rfl: 1    pantoprazole (PROTONIX) 40 MG EC tablet, Take 1 tablet by mouth Every Morning Before Breakfast., Disp: 90 tablet, Rfl: 1    Restasis 0.05 % ophthalmic emulsion, , Disp: , Rfl:     Symbicort 160-4.5 MCG/ACT inhaler, , Disp: , Rfl:     Ventolin  (90 Base) MCG/ACT inhaler, , Disp: , Rfl:     fluorometholone (FML) 0.1 % ophthalmic suspension, , Disp: , Rfl:     loratadine (CLARITIN) 10 MG tablet, Take 1 tablet by mouth Daily. (Patient not taking: Reported on 2/3/2025), Disp: , Rfl:     Allergies:   No Known Allergies    Objective     Physical Exam:   Vital Signs:   Vitals:    02/03/25 1028   BP: 142/86   Pulse: 109   Temp: 97.9 °F (36.6 °C)   SpO2: 97%   Weight: 101 kg (222 lb)   Height: 157.5 cm (62.01\")     Body mass index is " 40.59 kg/m².    Physical Exam  Vitals and nursing note reviewed.   Constitutional:       General: He is not in acute distress.     Appearance: Normal appearance. He is obese. He is not ill-appearing.      Comments: 54-year-old male   HENT:      Head: Normocephalic and atraumatic.      Nose: Nose normal.      Mouth/Throat:      Mouth: Mucous membranes are moist.   Eyes:      Extraocular Movements: Extraocular movements intact.      Pupils: Pupils are equal, round, and reactive to light.   Cardiovascular:      Rate and Rhythm: Normal rate and regular rhythm.      Pulses: Normal pulses.   Pulmonary:      Effort: Pulmonary effort is normal. No respiratory distress.   Skin:     General: Skin is warm and dry.   Neurological:      General: No focal deficit present.      Mental Status: He is alert and oriented to person, place, and time. Mental status is at baseline.   Psychiatric:         Mood and Affect: Mood normal.         Behavior: Behavior normal.       NIH 0     Modified [place] Score: 0        0  No Symptoms    1 No significant disability. Able to carry out all usual activities, despite some symptoms.    2 Slight disability. Able to look after own affairs without assistance, but unable to carry out all previous activities.    3 Moderate disability. Requires some help, but able to walk unassisted.    4 Moderately severe disability. Unable to attend to own bodily needs without assistance, and unable to walk unassisted.    5 Severe disability. Requires constant nursing care and attention, bedridden, incontinent.    6 Dead      PHQ-9 Depression Screening  Little interest or pleasure in doing things? Not at all   Feeling down, depressed, or hopeless? Not at all   PHQ-2 Total Score 0   Trouble falling or staying asleep, or sleeping too much?     Feeling tired or having little energy?     Poor appetite or overeating?     Feeling bad about yourself - or that you are a failure or have let yourself or your family down?      Trouble concentrating on things, such as reading the newspaper or watching television?     Moving or speaking so slowly that other people could have noticed? Or the opposite - being so fidgety or restless that you have been moving around a lot more than usual?     Thoughts that you would be better off dead, or of hurting yourself in some way?     PHQ-9 Total Score     If you checked off any problems, how difficult have these problems made it for you to do your work, take care of things at home, or get along with other people?           Hemoglobin   Date Value Ref Range Status   2025 14.7 13.0 - 17.7 g/dL Final     Hematocrit   Date Value Ref Range Status   2025 43.1 37.5 - 51.0 % Final     Platelets   Date Value Ref Range Status   2025 237 140 - 450 10*3/mm3 Final     Hemoglobin A1C   Date Value Ref Range Status   2025 6.30 (H) 4.80 - 5.60 % Final     LDL Cholesterol    Date Value Ref Range Status   2025 50 0 - 100 mg/dL Final     AST (SGOT)   Date Value Ref Range Status   2025 22 1 - 40 U/L Final     ALT (SGPT)   Date Value Ref Range Status   2025 30 1 - 41 U/L Final          Assessment / Plan      Assessment/Plan:     # Intermittent left sided paresthesias.   #TIA   -ABCD2 score 5; moderate risk with a 90-day stroke risk 9.8%  -Continue DAPT x 90 days followed by aspirin monotherapy. Patient has already discontinued Plavix and is being maintained on aspirin 81 mg daily.   -continue Lipitor 80 mg nightly   -TTE shows EF of 56 to 60%, normal left atrium, negative bubble study  -Patient would like to defer holter monitor at this time which I feel is reasonable as it is likely  related event with HTN, HLD, pre-diabetes, ANU.   -HTN normal blood pressure parameters, <130/80. Today BP is 140/80.   -HLD: Continue atorvastatin 80 mg nightly; LDL 55, goal <70  -Patient has sleep apnea and is compliant with his CPAP.  -Reviewed signs and symptoms of stroke and when to call  911  -Follow-up in the stroke clinic in 6 months     Discussed the importance of medication compliance and lifestyle modifications (adequate blood pressure control, adequate control of hyperlipidemia, adequate glycemic control, increase physical activity, and healthy diet) to help reduce the risk of future cerebrovascular events.  Also discussed the signs symptoms that would warrant the patient return back to the emergency department including unilateral weakness, unilateral numbness, visual disturbances, loss of balance, speech difficulties, and/or a sudden severe headache.    Follow Up:   Return in about 6 months (around 8/3/2025).    KIMBERLEE Guzman  [facility] [place] [facility]

## 2025-04-03 DIAGNOSIS — E78.2 MIXED HYPERLIPIDEMIA: ICD-10-CM

## 2025-04-03 RX ORDER — FENOFIBRATE 145 MG/1
145 TABLET, COATED ORAL DAILY
Qty: 90 TABLET | Refills: 1 | OUTPATIENT
Start: 2025-04-03

## 2025-04-08 ENCOUNTER — OFFICE VISIT (OUTPATIENT)
Dept: INTERNAL MEDICINE | Facility: CLINIC | Age: 55
End: 2025-04-08
Payer: COMMERCIAL

## 2025-04-08 VITALS
RESPIRATION RATE: 16 BRPM | WEIGHT: 215.2 LBS | SYSTOLIC BLOOD PRESSURE: 132 MMHG | HEART RATE: 80 BPM | HEIGHT: 62 IN | BODY MASS INDEX: 39.6 KG/M2 | DIASTOLIC BLOOD PRESSURE: 82 MMHG | TEMPERATURE: 97.3 F | OXYGEN SATURATION: 96 %

## 2025-04-08 DIAGNOSIS — I10 ESSENTIAL HYPERTENSION: ICD-10-CM

## 2025-04-08 DIAGNOSIS — R73.03 PREDIABETES: ICD-10-CM

## 2025-04-08 DIAGNOSIS — E78.2 MIXED HYPERLIPIDEMIA: Primary | ICD-10-CM

## 2025-04-08 DIAGNOSIS — G45.9 TIA (TRANSIENT ISCHEMIC ATTACK): ICD-10-CM

## 2025-04-08 DIAGNOSIS — K21.9 CHRONIC GERD: ICD-10-CM

## 2025-04-08 LAB
EXPIRATION DATE: ABNORMAL
HBA1C MFR BLD: 6.2 % (ref 4.5–5.7)
Lab: ABNORMAL

## 2025-04-08 PROCEDURE — 83036 HEMOGLOBIN GLYCOSYLATED A1C: CPT | Performed by: NURSE PRACTITIONER

## 2025-04-08 PROCEDURE — 3044F HG A1C LEVEL LT 7.0%: CPT | Performed by: NURSE PRACTITIONER

## 2025-04-08 PROCEDURE — 3075F SYST BP GE 130 - 139MM HG: CPT | Performed by: NURSE PRACTITIONER

## 2025-04-08 PROCEDURE — 1160F RVW MEDS BY RX/DR IN RCRD: CPT | Performed by: NURSE PRACTITIONER

## 2025-04-08 PROCEDURE — 3079F DIAST BP 80-89 MM HG: CPT | Performed by: NURSE PRACTITIONER

## 2025-04-08 PROCEDURE — 99214 OFFICE O/P EST MOD 30 MIN: CPT | Performed by: NURSE PRACTITIONER

## 2025-04-08 PROCEDURE — 1159F MED LIST DOCD IN RCRD: CPT | Performed by: NURSE PRACTITIONER

## 2025-04-08 PROCEDURE — 1126F AMNT PAIN NOTED NONE PRSNT: CPT | Performed by: NURSE PRACTITIONER

## 2025-04-08 RX ORDER — PANTOPRAZOLE SODIUM 40 MG/1
40 TABLET, DELAYED RELEASE ORAL
Qty: 90 TABLET | Refills: 1 | Status: SHIPPED | OUTPATIENT
Start: 2025-04-08

## 2025-04-08 RX ORDER — LISINOPRIL 10 MG/1
10 TABLET ORAL DAILY
Qty: 90 TABLET | Refills: 1 | Status: SHIPPED | OUTPATIENT
Start: 2025-04-08

## 2025-04-08 RX ORDER — ATORVASTATIN CALCIUM 80 MG/1
80 TABLET, FILM COATED ORAL NIGHTLY
Qty: 90 TABLET | Refills: 1 | Status: SHIPPED | OUTPATIENT
Start: 2025-04-08

## 2025-04-08 RX ORDER — AMLODIPINE BESYLATE 10 MG/1
10 TABLET ORAL DAILY
Qty: 90 TABLET | Refills: 1 | Status: SHIPPED | OUTPATIENT
Start: 2025-04-08

## 2025-04-08 NOTE — PROGRESS NOTES
Subjective   Demetria Johnson is a 54 y.o. male.     Chief Complaint   Patient presents with    Prediabetes    Hyperlipidemia    Transient Ischemic Attack    Heartburn    Hypertension       PCP: Samantha Saravia APRN    History of Present Illness     Demetria is a 54-year-old male who is here to follow-up on prediabetes, hyperlipidemia, GERD, hypertension, and TIA.  He reports he is doing fairly well overall has no acute complaints.  Blood pressure looks great in clinic at 132/82.  He is currently on amlodipine and lisinopril for blood pressure control.  Tolerates well without any side effects.  Currently on statin therapy.  Diet is fairly healthy in general and tries to stay physically active has lost about 7 pounds since last visit.  GERD is well-controlled with pantoprazole.  No unintentional weight losses, unusual fatigue, abdominal pain, nausea, or vomiting.  Does not monitor glucose levels.  Denies any symptoms of hypoglycemia.  No polyuria, polydipsia, or polyphasia.  Currently on aspirin, statin, ACE for history of TIA as well.  Denies any weakness, numbness, tingling, or visual disturbances.    Results      Lab Results   Component Value Date    WBC 8.18 01/09/2025    HGB 14.7 01/09/2025    HCT 43.1 01/09/2025    MCV 86.9 01/09/2025     01/09/2025     Lab Results   Component Value Date    GLUCOSE 130 (H) 01/15/2025    BUN 14 01/15/2025    CREATININE 1.15 01/15/2025    EGFR 76.1 01/15/2025    BCR 12.2 01/15/2025    K 4.9 01/15/2025    CO2 25.0 01/15/2025    CALCIUM 9.5 01/15/2025    ALBUMIN 4.2 01/09/2025    BILITOT 0.5 01/09/2025    AST 22 01/09/2025    ALT 30 01/09/2025     Lab Results   Component Value Date    CHOL 110 01/09/2025    TRIG 95 01/09/2025    HDL 42 01/09/2025    LDL 50 01/09/2025     Lab Results   Component Value Date    TSH 1.770 08/19/2024     A1C Last 3 Results          10/2/2024    10:55 1/9/2025    12:38 4/8/2025    12:03   HGBA1C Last 3 Results   Hemoglobin A1C 5.9  6.30  " 6.2        The following portions of the patient's history were reviewed and updated as appropriate: allergies, current medications, past family history, past medical history, past social history, past surgical history and problem list.              Outpatient Medications Marked as Taking for the 4/8/25 encounter (Office Visit) with Samantha Saravia APRN   Medication Sig Dispense Refill    amLODIPine (NORVASC) 10 MG tablet Take 1 tablet by mouth Daily. 90 tablet 1    aspirin (aspirin) 81 MG EC tablet Take 1 tablet by mouth Daily. 90 tablet 1    atorvastatin (LIPITOR) 80 MG tablet Take 1 tablet by mouth Every Night. 90 tablet 1    Azelastine HCl 137 MCG/SPRAY solution       fluorometholone (FML) 0.1 % ophthalmic suspension       Lifitegrast (XIIDRA) 5 % ophthalmic solution Apply 1 drop to eye(s) as directed by provider.      lisinopril (PRINIVIL,ZESTRIL) 10 MG tablet Take 1 tablet by mouth Daily. 90 tablet 1    loratadine (CLARITIN) 10 MG tablet Take 1 tablet by mouth Daily.      pantoprazole (PROTONIX) 40 MG EC tablet Take 1 tablet by mouth Every Morning Before Breakfast. 90 tablet 1    Restasis 0.05 % ophthalmic emulsion       Symbicort 160-4.5 MCG/ACT inhaler       [DISCONTINUED] amLODIPine (NORVASC) 10 MG tablet Take 1 tablet by mouth Daily. 90 tablet 1    [DISCONTINUED] atorvastatin (LIPITOR) 80 MG tablet Take 1 tablet by mouth Every Night. 90 tablet 1    [DISCONTINUED] lisinopril (PRINIVIL,ZESTRIL) 10 MG tablet Take 1 tablet by mouth Daily. 90 tablet 1    [DISCONTINUED] pantoprazole (PROTONIX) 40 MG EC tablet Take 1 tablet by mouth Every Morning Before Breakfast. 90 tablet 1     No Known Allergies        Objective     Vitals:    04/08/25 1024   BP: 132/82   BP Location: Right arm   Patient Position: Sitting   Cuff Size: Adult   Pulse: 80   Resp: 16   Temp: 97.3 °F (36.3 °C)   TempSrc: Infrared   SpO2: 96%   Weight: 97.6 kg (215 lb 3.2 oz)   Height: 157.5 cm (62.01\")     Body mass index is 39.35 kg/m².  Wt " Readings from Last 3 Encounters:   04/08/25 97.6 kg (215 lb 3.2 oz)   02/03/25 101 kg (222 lb)   01/08/25 101 kg (222 lb)       Physical Exam  Vitals and nursing note reviewed.   Constitutional:       Appearance: Normal appearance. He is well-developed.   HENT:      Head: Normocephalic and atraumatic.   Eyes:      Conjunctiva/sclera: Conjunctivae normal.   Cardiovascular:      Rate and Rhythm: Normal rate and regular rhythm.      Heart sounds: Normal heart sounds.   Pulmonary:      Effort: Pulmonary effort is normal. No respiratory distress.      Breath sounds: Normal breath sounds.   Abdominal:      General: Bowel sounds are normal. There is no distension.      Palpations: Abdomen is soft.      Tenderness: There is no abdominal tenderness.   Musculoskeletal:      Cervical back: Normal range of motion.   Skin:     General: Skin is warm and dry.   Neurological:      Mental Status: He is alert and oriented to person, place, and time.   Psychiatric:         Speech: Speech normal.         Behavior: Behavior normal.         Thought Content: Thought content normal.         Judgment: Judgment normal.               Assessment & Plan   Diagnoses and all orders for this visit:    1. Mixed hyperlipidemia (Primary)  -     atorvastatin (LIPITOR) 80 MG tablet; Take 1 tablet by mouth Every Night.  Dispense: 90 tablet; Refill: 1  Stable.  Continue statin.  Low-fat low-cholesterol diet.  2. Prediabetes  -     POC Glycosylated Hemoglobin (Hb A1C)  Lab Results   Component Value Date    HGBA1C 6.2 (A) 04/08/2025   Stable.  Continue ADA diet.    3. Essential hypertension  -     lisinopril (PRINIVIL,ZESTRIL) 10 MG tablet; Take 1 tablet by mouth Daily.  Dispense: 90 tablet; Refill: 1  -     amLODIPine (NORVASC) 10 MG tablet; Take 1 tablet by mouth Daily.  Dispense: 90 tablet; Refill: 1    4. Chronic GERD  -     pantoprazole (PROTONIX) 40 MG EC tablet; Take 1 tablet by mouth Every Morning Before Breakfast.  Dispense: 90 tablet; Refill:  1    5. TIA (transient ischemic attack)  -     atorvastatin (LIPITOR) 80 MG tablet; Take 1 tablet by mouth Every Night.  Dispense: 90 tablet; Refill: 1  Continue aspirin and statin.  Good BP control.            Return in about 3 months (around 7/8/2025) for chronic condition follow up.    I discussed my findings,recommendations, and plan of care was with the patient. They verbalized understanding and agreement.  Patient was encouraged to keep me informed of any acute changes, lack of improvement, or any new concerning symptoms.